# Patient Record
Sex: FEMALE | Race: WHITE | NOT HISPANIC OR LATINO | Employment: OTHER | URBAN - NONMETROPOLITAN AREA
[De-identification: names, ages, dates, MRNs, and addresses within clinical notes are randomized per-mention and may not be internally consistent; named-entity substitution may affect disease eponyms.]

---

## 2017-04-12 ENCOUNTER — OFFICE VISIT (OUTPATIENT)
Dept: OPHTHALMOLOGY | Facility: CLINIC | Age: 72
End: 2017-04-12

## 2017-04-12 DIAGNOSIS — E11.9 TYPE 2 DIABETES MELLITUS WITHOUT RETINOPATHY (HCC): Primary | ICD-10-CM

## 2017-04-12 DIAGNOSIS — H02.836 DERMATOCHALASIS OF BOTH EYELIDS: ICD-10-CM

## 2017-04-12 DIAGNOSIS — H02.833 DERMATOCHALASIS OF BOTH EYELIDS: ICD-10-CM

## 2017-04-12 DIAGNOSIS — H26.9 CATARACT: ICD-10-CM

## 2017-04-12 PROCEDURE — 92020 GONIOSCOPY: CPT | Performed by: OPHTHALMOLOGY

## 2017-04-12 PROCEDURE — 92134 CPTRZ OPH DX IMG PST SGM RTA: CPT | Performed by: OPHTHALMOLOGY

## 2017-04-12 PROCEDURE — 99214 OFFICE O/P EST MOD 30 MIN: CPT | Performed by: OPHTHALMOLOGY

## 2017-04-12 RX ORDER — FUROSEMIDE 40 MG/1
TABLET ORAL 2 TIMES DAILY
COMMUNITY
Start: 2017-03-28 | End: 2022-01-01 | Stop reason: SDUPTHER

## 2017-04-12 NOTE — PROGRESS NOTES
Subjective   Dana Ruiz is a 71 y.o. female.   Chief Complaint   Patient presents with   • Cataract   • Diabetic Eye Exam     HPI     Cataract   In both eyes.  Associated symptoms include blurred vision.  Severity is mild.  Onset was gradual.  Context:  near vision.  Affected activities include reading.  Response to treatment was no improvement.           Diabetic Eye Exam   Associated symptoms include blurred vision.  Diabetes characteristics include taking oral medications and Type 2.  Blood sugar level is controlled.       Last edited by Brayan Day MD on 4/12/2017  8:28 AM. (History)          Review of Systems   Constitutional: Negative for chills and fever.   Respiratory: Negative for shortness of breath.    Cardiovascular: Negative for chest pain.   Gastrointestinal: Negative for abdominal pain.   Endocrine: Negative for polydipsia, polyphagia and polyuria.   Genitourinary: Negative for dysuria.   Psychiatric/Behavioral: Negative for agitation.       Objective   Base Eye Exam     Visual Acuity (Snellen - Linear)      Right Left   Dist sc 20/60 +2 20/70 -2   Near sc J16 20/400         Tonometry (Applanation, 8:36 AM)      Right Left   Pressure 12 12         Gonioscopy      Right Left   Temporal TAM/PTM TAM/PTM   Nasal TAM/PTM TAM/PTM   Superior TAM/PTM TAM/PTM   Inferior TAM/PTM TAM/PTM         Pupils      Pupils   Right PERRL   Left PERRL         Visual Fields      Left Right   Result Full Full         Extraocular Movement      Right Left   Result Full Full         Neuro/Psych     Oriented x3:  Yes      Dilation     Both eyes:  1.0% Mydriacyl @ 8:39 AM            Slit Lamp and Fundus Exam     External Exam      Right Left    External Normal Normal      Slit Lamp Exam      Right Left    Lids/Lashes Dermatochalasis - upper lid Dermatochalasis - upper lid    Conjunctiva/Sclera White and quiet White and quiet    Cornea dense arcus dense arcus with a pannus inferiorly    Anterior Chamber Deep and  quiet Deep and quiet    Iris Round and reactive Round and reactive    Lens Nuclear sclerosis Nuclear sclerosis    Vitreous Normal Normal      Fundus Exam      Right Left    Disc Normal Normal    C/D Ratio 0.15 0.2    Macula Normal Normal    Vessels Normal Normal    Periphery Normal Normal            Refraction     Manifest Refraction      Sphere Cylinder Axis Dist Add   Right +1.50 +1.00 020 20/40-2 +2.50   Left +1.75 +1.00 153 20/20-2 +2.50         Final Rx      Sphere Cylinder Axis Add   Right +1.50 +1.00 020 +2.50   Left +1.75 +1.00 153 +2.50                     OCT Macula:  OD- normal  OS- normal    Assessment/Plan   Diagnoses and all orders for this visit:    Type 2 diabetes mellitus without retinopathy  Comments:  No macula edema. Cause of decreased vision is cataract.    Cataract  Comments:  Visually significant of the right eye.  Risks benefits alternatives of surgery discussed.  Patient would like to observe at this time follow-up in 3 months for     Dermatochalasis of both eyelids  Comments:  Observe.    Plan:       Return in about 3 months (around 7/12/2017) for cataract eval.                            This document has been signed by Brayan Day MD on April 12, 2017 9:38 AM

## 2018-12-21 ENCOUNTER — TRANSCRIBE ORDERS (OUTPATIENT)
Dept: ADMINISTRATIVE | Facility: HOSPITAL | Age: 73
End: 2018-12-21

## 2018-12-21 DIAGNOSIS — I50.9 HEART FAILURE, UNSPECIFIED HF CHRONICITY, UNSPECIFIED HEART FAILURE TYPE (HCC): Primary | ICD-10-CM

## 2020-02-04 ENCOUNTER — HOSPITAL ENCOUNTER (OUTPATIENT)
Dept: ULTRASOUND IMAGING | Facility: HOSPITAL | Age: 75
Discharge: HOME | End: 2020-02-04
Admitting: NURSE PRACTITIONER

## 2020-02-04 DIAGNOSIS — I10 ESSENTIAL (PRIMARY) HYPERTENSION: ICD-10-CM

## 2020-02-04 DIAGNOSIS — N17.9 ACUTE NONTRAUMATIC KIDNEY INJURY (HCC): ICD-10-CM

## 2020-02-04 DIAGNOSIS — N18.4 CHRONIC KIDNEY DISEASE, STAGE 4 (SEVERE) (HCC): ICD-10-CM

## 2020-02-04 PROCEDURE — 76775 US EXAM ABDO BACK WALL LIM: CPT

## 2020-02-11 ENCOUNTER — LAB (OUTPATIENT)
Dept: LAB | Facility: HOSPITAL | Age: 75
End: 2020-02-11

## 2020-02-11 ENCOUNTER — TRANSCRIBE ORDERS (OUTPATIENT)
Dept: LAB | Facility: HOSPITAL | Age: 75
End: 2020-02-11

## 2020-02-11 DIAGNOSIS — I10 ESSENTIAL (PRIMARY) HYPERTENSION: ICD-10-CM

## 2020-02-11 DIAGNOSIS — N17.9 ACUTE NONTRAUMATIC KIDNEY INJURY (HCC): ICD-10-CM

## 2020-02-11 DIAGNOSIS — E11.9 DIABETES MELLITUS WITHOUT COMPLICATION (HCC): ICD-10-CM

## 2020-02-11 DIAGNOSIS — E78.5 HYPERLIPIDEMIA, UNSPECIFIED HYPERLIPIDEMIA TYPE: ICD-10-CM

## 2020-02-11 DIAGNOSIS — I50.9 CONGESTIVE HEART FAILURE, UNSPECIFIED HF CHRONICITY, UNSPECIFIED HEART FAILURE TYPE (HCC): ICD-10-CM

## 2020-02-11 DIAGNOSIS — N18.4 CHRONIC KIDNEY DISEASE, STAGE IV (SEVERE) (HCC): ICD-10-CM

## 2020-02-11 DIAGNOSIS — N17.9 ACUTE NONTRAUMATIC KIDNEY INJURY (HCC): Primary | ICD-10-CM

## 2020-02-11 LAB
25(OH)D3 SERPL-MCNC: 17.8 NG/ML (ref 30–100)
ALBUMIN SERPL-MCNC: 3.7 G/DL (ref 3.5–5.2)
ALBUMIN/GLOB SERPL: 1.1 G/DL
ALP SERPL-CCNC: 86 U/L (ref 39–117)
ALT SERPL W P-5'-P-CCNC: 9 U/L (ref 1–33)
ANION GAP SERPL CALCULATED.3IONS-SCNC: 13.4 MMOL/L (ref 5–15)
AST SERPL-CCNC: 15 U/L (ref 1–32)
BILIRUB SERPL-MCNC: 0.2 MG/DL (ref 0.2–1.2)
BILIRUB UR QL STRIP: NEGATIVE
BUN BLD-MCNC: 23 MG/DL (ref 8–23)
BUN/CREAT SERPL: 17.2 (ref 7–25)
CALCIUM SPEC-SCNC: 9.4 MG/DL (ref 8.6–10.5)
CHLORIDE SERPL-SCNC: 96 MMOL/L (ref 98–107)
CLARITY UR: CLEAR
CO2 SERPL-SCNC: 29.6 MMOL/L (ref 22–29)
COLOR UR: YELLOW
CREAT BLD-MCNC: 1.34 MG/DL (ref 0.57–1)
CREAT UR-MCNC: 36 MG/DL
GFR SERPL CREATININE-BSD FRML MDRD: 39 ML/MIN/1.73
GLOBULIN UR ELPH-MCNC: 3.5 GM/DL
GLUCOSE BLD-MCNC: 250 MG/DL (ref 65–99)
GLUCOSE UR STRIP-MCNC: NEGATIVE MG/DL
HBV SURFACE AG SERPL QL IA: NORMAL
HCV AB SER DONR QL: NORMAL
HGB UR QL STRIP.AUTO: NEGATIVE
HIV1+2 AB SER QL: NORMAL
KETONES UR QL STRIP: NEGATIVE
LEUKOCYTE ESTERASE UR QL STRIP.AUTO: ABNORMAL
MAGNESIUM SERPL-MCNC: 2.5 MG/DL (ref 1.6–2.4)
NITRITE UR QL STRIP: NEGATIVE
PH UR STRIP.AUTO: 6 [PH] (ref 5–8)
PHOSPHATE SERPL-MCNC: 3.6 MG/DL (ref 2.5–4.5)
POTASSIUM BLD-SCNC: 3.5 MMOL/L (ref 3.5–5.2)
PROT SERPL-MCNC: 7.2 G/DL (ref 6–8.5)
PROT UR QL STRIP: NEGATIVE
PROT UR-MCNC: 5 MG/DL
PROT/CREAT UR: 138.9 MG/G CREA (ref 0–200)
PTH-INTACT SERPL-MCNC: 94.1 PG/ML (ref 15–65)
SODIUM BLD-SCNC: 139 MMOL/L (ref 136–145)
SP GR UR STRIP: 1.01 (ref 1–1.03)
URATE SERPL-MCNC: 7.3 MG/DL (ref 2.4–5.7)
UROBILINOGEN UR QL STRIP: ABNORMAL

## 2020-02-11 PROCEDURE — 86225 DNA ANTIBODY NATIVE: CPT

## 2020-02-11 PROCEDURE — 84550 ASSAY OF BLOOD/URIC ACID: CPT

## 2020-02-11 PROCEDURE — 86235 NUCLEAR ANTIGEN ANTIBODY: CPT

## 2020-02-11 PROCEDURE — 83735 ASSAY OF MAGNESIUM: CPT | Performed by: NURSE PRACTITIONER

## 2020-02-11 PROCEDURE — 36415 COLL VENOUS BLD VENIPUNCTURE: CPT | Performed by: NURSE PRACTITIONER

## 2020-02-11 PROCEDURE — 82306 VITAMIN D 25 HYDROXY: CPT | Performed by: NURSE PRACTITIONER

## 2020-02-11 PROCEDURE — 86803 HEPATITIS C AB TEST: CPT

## 2020-02-11 PROCEDURE — 84100 ASSAY OF PHOSPHORUS: CPT

## 2020-02-11 PROCEDURE — 86160 COMPLEMENT ANTIGEN: CPT

## 2020-02-11 PROCEDURE — 84156 ASSAY OF PROTEIN URINE: CPT | Performed by: NURSE PRACTITIONER

## 2020-02-11 PROCEDURE — G0432 EIA HIV-1/HIV-2 SCREEN: HCPCS

## 2020-02-11 PROCEDURE — 86162 COMPLEMENT TOTAL (CH50): CPT

## 2020-02-11 PROCEDURE — 83970 ASSAY OF PARATHORMONE: CPT | Performed by: NURSE PRACTITIONER

## 2020-02-11 PROCEDURE — 82570 ASSAY OF URINE CREATININE: CPT | Performed by: NURSE PRACTITIONER

## 2020-02-11 PROCEDURE — 87340 HEPATITIS B SURFACE AG IA: CPT

## 2020-02-11 PROCEDURE — 81003 URINALYSIS AUTO W/O SCOPE: CPT | Performed by: NURSE PRACTITIONER

## 2020-02-11 PROCEDURE — 80053 COMPREHEN METABOLIC PANEL: CPT | Performed by: NURSE PRACTITIONER

## 2020-02-12 LAB
C3 SERPL-MCNC: 132 MG/DL (ref 82–167)
C4 SERPL-MCNC: 21 MG/DL (ref 14–44)
CENTROMERE B AB SER-ACNC: <0.2 AI (ref 0–0.9)
CH50 SERPL-ACNC: >60 U/ML
CHROMATIN AB SERPL-ACNC: <0.2 AI (ref 0–0.9)
DSDNA AB SER-ACNC: 3 IU/ML (ref 0–9)
ENA JO1 AB SER-ACNC: <0.2 AI (ref 0–0.9)
ENA RNP AB SER-ACNC: <0.2 AI (ref 0–0.9)
ENA SCL70 AB SER-ACNC: <0.2 AI (ref 0–0.9)
ENA SM AB SER-ACNC: <0.2 AI (ref 0–0.9)
ENA SS-A AB SER-ACNC: >8 AI (ref 0–0.9)
ENA SS-B AB SER-ACNC: <0.2 AI (ref 0–0.9)
HOLD SPECIMEN: NORMAL
Lab: ABNORMAL

## 2020-06-10 ENCOUNTER — TRANSCRIBE ORDERS (OUTPATIENT)
Dept: LAB | Facility: HOSPITAL | Age: 75
End: 2020-06-10

## 2020-06-10 ENCOUNTER — LAB (OUTPATIENT)
Dept: LAB | Facility: HOSPITAL | Age: 75
End: 2020-06-10

## 2020-06-10 DIAGNOSIS — I10 ESSENTIAL (PRIMARY) HYPERTENSION: ICD-10-CM

## 2020-06-10 DIAGNOSIS — E11.9 TYPE 2 DIABETES MELLITUS WITHOUT COMPLICATION, UNSPECIFIED WHETHER LONG TERM INSULIN USE (HCC): ICD-10-CM

## 2020-06-10 DIAGNOSIS — I50.9 HEART FAILURE, UNSPECIFIED HF CHRONICITY, UNSPECIFIED HEART FAILURE TYPE (HCC): ICD-10-CM

## 2020-06-10 DIAGNOSIS — E78.5 HYPERLIPIDEMIA, UNSPECIFIED HYPERLIPIDEMIA TYPE: ICD-10-CM

## 2020-06-10 DIAGNOSIS — N17.9 ACUTE RENAL FAILURE, UNSPECIFIED ACUTE RENAL FAILURE TYPE (HCC): Primary | ICD-10-CM

## 2020-06-10 DIAGNOSIS — N18.30 CHRONIC KIDNEY DISEASE, STAGE III (MODERATE) (HCC): ICD-10-CM

## 2020-06-10 LAB
25(OH)D3 SERPL-MCNC: 31.6 NG/ML (ref 30–100)
ALBUMIN SERPL-MCNC: 4 G/DL (ref 3.5–5.2)
ANION GAP SERPL CALCULATED.3IONS-SCNC: 11.1 MMOL/L (ref 5–15)
BUN BLD-MCNC: 27 MG/DL (ref 8–23)
BUN/CREAT SERPL: 20.6 (ref 7–25)
CALCIUM SPEC-SCNC: 9.6 MG/DL (ref 8.6–10.5)
CHLORIDE SERPL-SCNC: 97 MMOL/L (ref 98–107)
CO2 SERPL-SCNC: 32.9 MMOL/L (ref 22–29)
CREAT BLD-MCNC: 1.31 MG/DL (ref 0.57–1)
GFR SERPL CREATININE-BSD FRML MDRD: 40 ML/MIN/1.73
GLUCOSE BLD-MCNC: 153 MG/DL (ref 65–99)
PHOSPHATE SERPL-MCNC: 3.9 MG/DL (ref 2.5–4.5)
POTASSIUM BLD-SCNC: 3.8 MMOL/L (ref 3.5–5.2)
PTH-INTACT SERPL-MCNC: 48.6 PG/ML (ref 15–65)
SODIUM BLD-SCNC: 141 MMOL/L (ref 136–145)

## 2020-06-10 PROCEDURE — 36415 COLL VENOUS BLD VENIPUNCTURE: CPT | Performed by: NURSE PRACTITIONER

## 2020-06-10 PROCEDURE — 83970 ASSAY OF PARATHORMONE: CPT | Performed by: NURSE PRACTITIONER

## 2020-06-10 PROCEDURE — 82306 VITAMIN D 25 HYDROXY: CPT | Performed by: NURSE PRACTITIONER

## 2020-06-10 PROCEDURE — 80069 RENAL FUNCTION PANEL: CPT | Performed by: NURSE PRACTITIONER

## 2020-12-15 ENCOUNTER — TRANSCRIBE ORDERS (OUTPATIENT)
Dept: LAB | Facility: HOSPITAL | Age: 75
End: 2020-12-15

## 2020-12-15 ENCOUNTER — LAB (OUTPATIENT)
Dept: LAB | Facility: HOSPITAL | Age: 75
End: 2020-12-15

## 2020-12-15 DIAGNOSIS — N18.30 STAGE 3 CHRONIC KIDNEY DISEASE, UNSPECIFIED WHETHER STAGE 3A OR 3B CKD (HCC): ICD-10-CM

## 2020-12-15 DIAGNOSIS — E78.5 HYPERLIPIDEMIA, UNSPECIFIED HYPERLIPIDEMIA TYPE: ICD-10-CM

## 2020-12-15 DIAGNOSIS — E11.9 TYPE 2 DIABETES MELLITUS WITHOUT COMPLICATION, UNSPECIFIED WHETHER LONG TERM INSULIN USE (HCC): ICD-10-CM

## 2020-12-15 DIAGNOSIS — I10 ESSENTIAL (PRIMARY) HYPERTENSION: ICD-10-CM

## 2020-12-15 DIAGNOSIS — I50.9 HEART FAILURE, UNSPECIFIED HF CHRONICITY, UNSPECIFIED HEART FAILURE TYPE (HCC): ICD-10-CM

## 2020-12-15 DIAGNOSIS — N17.9 ACUTE RENAL FAILURE, UNSPECIFIED ACUTE RENAL FAILURE TYPE (HCC): ICD-10-CM

## 2020-12-15 DIAGNOSIS — N17.9 ACUTE RENAL FAILURE, UNSPECIFIED ACUTE RENAL FAILURE TYPE (HCC): Primary | ICD-10-CM

## 2020-12-15 PROCEDURE — 83970 ASSAY OF PARATHORMONE: CPT | Performed by: NURSE PRACTITIONER

## 2020-12-15 PROCEDURE — 82728 ASSAY OF FERRITIN: CPT

## 2020-12-15 PROCEDURE — 83540 ASSAY OF IRON: CPT

## 2020-12-15 PROCEDURE — 81003 URINALYSIS AUTO W/O SCOPE: CPT | Performed by: NURSE PRACTITIONER

## 2020-12-15 PROCEDURE — 84550 ASSAY OF BLOOD/URIC ACID: CPT | Performed by: NURSE PRACTITIONER

## 2020-12-15 PROCEDURE — 36415 COLL VENOUS BLD VENIPUNCTURE: CPT

## 2020-12-15 PROCEDURE — 85018 HEMOGLOBIN: CPT | Performed by: NURSE PRACTITIONER

## 2020-12-15 PROCEDURE — 82607 VITAMIN B-12: CPT | Performed by: NURSE PRACTITIONER

## 2020-12-15 PROCEDURE — 80069 RENAL FUNCTION PANEL: CPT | Performed by: NURSE PRACTITIONER

## 2020-12-15 PROCEDURE — 84156 ASSAY OF PROTEIN URINE: CPT | Performed by: NURSE PRACTITIONER

## 2020-12-15 PROCEDURE — 84466 ASSAY OF TRANSFERRIN: CPT

## 2020-12-15 PROCEDURE — 82570 ASSAY OF URINE CREATININE: CPT | Performed by: NURSE PRACTITIONER

## 2020-12-15 PROCEDURE — 85014 HEMATOCRIT: CPT | Performed by: NURSE PRACTITIONER

## 2020-12-15 PROCEDURE — 82306 VITAMIN D 25 HYDROXY: CPT | Performed by: NURSE PRACTITIONER

## 2020-12-15 PROCEDURE — 82746 ASSAY OF FOLIC ACID SERUM: CPT

## 2020-12-16 LAB
25(OH)D3 SERPL-MCNC: 41.8 NG/ML (ref 30–100)
ALBUMIN SERPL-MCNC: 4 G/DL (ref 3.5–5.2)
ANION GAP SERPL CALCULATED.3IONS-SCNC: 12.1 MMOL/L (ref 5–15)
BILIRUB UR QL STRIP: NEGATIVE
BUN SERPL-MCNC: 19 MG/DL (ref 8–23)
BUN/CREAT SERPL: 16.5 (ref 7–25)
CALCIUM SPEC-SCNC: 9.5 MG/DL (ref 8.6–10.5)
CHLORIDE SERPL-SCNC: 98 MMOL/L (ref 98–107)
CLARITY UR: CLEAR
CO2 SERPL-SCNC: 28.9 MMOL/L (ref 22–29)
COLOR UR: YELLOW
CREAT SERPL-MCNC: 1.15 MG/DL (ref 0.57–1)
CREAT UR-MCNC: 28.5 MG/DL
FERRITIN SERPL-MCNC: 153 NG/ML (ref 13–150)
FOLATE SERPL-MCNC: 19.9 NG/ML (ref 4.78–24.2)
GFR SERPL CREATININE-BSD FRML MDRD: 46 ML/MIN/1.73
GLUCOSE SERPL-MCNC: 220 MG/DL (ref 65–99)
GLUCOSE UR STRIP-MCNC: NEGATIVE MG/DL
HCT VFR BLD AUTO: 36 % (ref 34–46.6)
HGB BLD-MCNC: 11.8 G/DL (ref 12–15.9)
HGB UR QL STRIP.AUTO: NEGATIVE
IRON 24H UR-MRATE: 31 MCG/DL (ref 37–145)
IRON SATN MFR SERPL: 9 % (ref 20–50)
KETONES UR QL STRIP: NEGATIVE
LEUKOCYTE ESTERASE UR QL STRIP.AUTO: NEGATIVE
NITRITE UR QL STRIP: NEGATIVE
PH UR STRIP.AUTO: 6.5 [PH] (ref 5–8)
PHOSPHATE SERPL-MCNC: 3.3 MG/DL (ref 2.5–4.5)
POTASSIUM SERPL-SCNC: 3.6 MMOL/L (ref 3.5–5.2)
PROT UR QL STRIP: NEGATIVE
PROT UR-MCNC: 8 MG/DL
PROT/CREAT UR: 280.7 MG/G CREA (ref 0–200)
PTH-INTACT SERPL-MCNC: 68.9 PG/ML (ref 15–65)
SODIUM SERPL-SCNC: 139 MMOL/L (ref 136–145)
SP GR UR STRIP: 1.01 (ref 1–1.03)
TIBC SERPL-MCNC: 352 MCG/DL (ref 298–536)
TRANSFERRIN SERPL-MCNC: 236 MG/DL (ref 200–360)
URATE SERPL-MCNC: 8.1 MG/DL (ref 2.4–5.7)
UROBILINOGEN UR QL STRIP: NORMAL
VIT B12 BLD-MCNC: 341 PG/ML (ref 211–946)

## 2021-06-15 ENCOUNTER — TRANSCRIBE ORDERS (OUTPATIENT)
Dept: LAB | Facility: HOSPITAL | Age: 76
End: 2021-06-15

## 2021-06-15 ENCOUNTER — LAB (OUTPATIENT)
Dept: LAB | Facility: HOSPITAL | Age: 76
End: 2021-06-15

## 2021-06-15 DIAGNOSIS — N18.32 CHRONIC KIDNEY DISEASE (CKD) STAGE G3B/A1, MODERATELY DECREASED GLOMERULAR FILTRATION RATE (GFR) BETWEEN 30-44 ML/MIN/1.73 SQUARE METER AND ALBUMINURIA CREATININE RATIO LESS THAN 30 MG/G (CMS/H* (HCC): Primary | ICD-10-CM

## 2021-06-15 DIAGNOSIS — I50.9 HEART FAILURE, UNSPECIFIED HF CHRONICITY, UNSPECIFIED HEART FAILURE TYPE (HCC): ICD-10-CM

## 2021-06-15 DIAGNOSIS — I10 HYPERTENSION, ESSENTIAL: ICD-10-CM

## 2021-06-15 DIAGNOSIS — E11.9 TYPE 2 DIABETES MELLITUS WITHOUT COMPLICATION, UNSPECIFIED WHETHER LONG TERM INSULIN USE (HCC): ICD-10-CM

## 2021-06-15 PROCEDURE — 83735 ASSAY OF MAGNESIUM: CPT | Performed by: NURSE PRACTITIONER

## 2021-06-15 PROCEDURE — 36415 COLL VENOUS BLD VENIPUNCTURE: CPT | Performed by: NURSE PRACTITIONER

## 2021-06-15 PROCEDURE — 84550 ASSAY OF BLOOD/URIC ACID: CPT | Performed by: NURSE PRACTITIONER

## 2021-06-15 PROCEDURE — 84466 ASSAY OF TRANSFERRIN: CPT | Performed by: NURSE PRACTITIONER

## 2021-06-15 PROCEDURE — 80069 RENAL FUNCTION PANEL: CPT | Performed by: NURSE PRACTITIONER

## 2021-06-15 PROCEDURE — 83540 ASSAY OF IRON: CPT | Performed by: NURSE PRACTITIONER

## 2021-06-16 LAB
ALBUMIN SERPL-MCNC: 4.3 G/DL (ref 3.5–5.2)
ANION GAP SERPL CALCULATED.3IONS-SCNC: 8.2 MMOL/L (ref 5–15)
BUN SERPL-MCNC: 30 MG/DL (ref 8–23)
BUN/CREAT SERPL: 24.4 (ref 7–25)
CALCIUM SPEC-SCNC: 9.9 MG/DL (ref 8.6–10.5)
CHLORIDE SERPL-SCNC: 99 MMOL/L (ref 98–107)
CO2 SERPL-SCNC: 32.8 MMOL/L (ref 22–29)
CREAT SERPL-MCNC: 1.23 MG/DL (ref 0.57–1)
GFR SERPL CREATININE-BSD FRML MDRD: 42 ML/MIN/1.73
GLUCOSE SERPL-MCNC: 114 MG/DL (ref 65–99)
IRON 24H UR-MRATE: 98 MCG/DL (ref 37–145)
IRON SATN MFR SERPL: 24 % (ref 20–50)
MAGNESIUM SERPL-MCNC: 2.2 MG/DL (ref 1.6–2.4)
PHOSPHATE SERPL-MCNC: 3 MG/DL (ref 2.5–4.5)
POTASSIUM SERPL-SCNC: 4.2 MMOL/L (ref 3.5–5.2)
SODIUM SERPL-SCNC: 140 MMOL/L (ref 136–145)
TIBC SERPL-MCNC: 410 MCG/DL (ref 298–536)
TRANSFERRIN SERPL-MCNC: 275 MG/DL (ref 200–360)
URATE SERPL-MCNC: 7.5 MG/DL (ref 2.4–5.7)

## 2021-12-20 ENCOUNTER — OUTSIDE FACILITY SERVICE (OUTPATIENT)
Dept: CARDIOLOGY | Facility: CLINIC | Age: 76
End: 2021-12-20

## 2021-12-20 DIAGNOSIS — R06.02 SHORTNESS OF BREATH: ICD-10-CM

## 2021-12-20 PROCEDURE — 93010 ELECTROCARDIOGRAM REPORT: CPT | Performed by: INTERNAL MEDICINE

## 2021-12-21 ENCOUNTER — APPOINTMENT (OUTPATIENT)
Dept: ULTRASOUND IMAGING | Facility: HOSPITAL | Age: 76
End: 2021-12-21

## 2021-12-21 ENCOUNTER — HOSPITAL ENCOUNTER (INPATIENT)
Facility: HOSPITAL | Age: 76
LOS: 2 days | Discharge: HOME | End: 2021-12-23
Attending: EMERGENCY MEDICINE | Admitting: FAMILY MEDICINE

## 2021-12-21 ENCOUNTER — APPOINTMENT (OUTPATIENT)
Dept: GENERAL RADIOLOGY | Facility: HOSPITAL | Age: 76
End: 2021-12-21

## 2021-12-21 DIAGNOSIS — J18.9 PNEUMONIA DUE TO INFECTIOUS ORGANISM, UNSPECIFIED LATERALITY, UNSPECIFIED PART OF LUNG: ICD-10-CM

## 2021-12-21 DIAGNOSIS — R06.09 DYSPNEA ON EXERTION: ICD-10-CM

## 2021-12-21 DIAGNOSIS — R09.02 HYPOXIA: Primary | ICD-10-CM

## 2021-12-21 PROBLEM — E11.9 TYPE 2 DIABETES MELLITUS: Status: ACTIVE | Noted: 2021-12-21

## 2021-12-21 PROBLEM — N18.30 CKD (CHRONIC KIDNEY DISEASE) STAGE 3, GFR 30-59 ML/MIN: Status: ACTIVE | Noted: 2021-12-21

## 2021-12-21 PROBLEM — J44.1 COPD WITH ACUTE EXACERBATION (HCC): Status: ACTIVE | Noted: 2021-12-21

## 2021-12-21 LAB
ALBUMIN SERPL-MCNC: 3.8 G/DL (ref 3.5–5.2)
ALBUMIN/GLOB SERPL: 1 G/DL
ALP SERPL-CCNC: 86 U/L (ref 39–117)
ALT SERPL W P-5'-P-CCNC: 8 U/L (ref 1–33)
ANION GAP SERPL CALCULATED.3IONS-SCNC: 14 MMOL/L (ref 5–15)
AST SERPL-CCNC: 24 U/L (ref 1–32)
BACTERIA UR QL AUTO: ABNORMAL /HPF
BASOPHILS # BLD AUTO: 0.04 10*3/MM3 (ref 0–0.2)
BASOPHILS NFR BLD AUTO: 0.4 % (ref 0–1.5)
BILIRUB SERPL-MCNC: 0.5 MG/DL (ref 0–1.2)
BILIRUB UR QL STRIP: NEGATIVE
BUN SERPL-MCNC: 29 MG/DL (ref 8–23)
BUN/CREAT SERPL: 21 (ref 7–25)
CALCIUM SPEC-SCNC: 9.3 MG/DL (ref 8.6–10.5)
CHLORIDE SERPL-SCNC: 94 MMOL/L (ref 98–107)
CLARITY UR: CLEAR
CO2 SERPL-SCNC: 28 MMOL/L (ref 22–29)
COLOR UR: YELLOW
CREAT SERPL-MCNC: 1.38 MG/DL (ref 0.57–1)
D-DIMER, QUANTITATIVE (MAD,POW, STR): 1868 NG/ML (FEU) (ref 0–470)
D-LACTATE SERPL-SCNC: 1.8 MMOL/L (ref 0.5–2)
DEPRECATED RDW RBC AUTO: 45.7 FL (ref 37–54)
EOSINOPHIL # BLD AUTO: 0.01 10*3/MM3 (ref 0–0.4)
EOSINOPHIL NFR BLD AUTO: 0.1 % (ref 0.3–6.2)
ERYTHROCYTE [DISTWIDTH] IN BLOOD BY AUTOMATED COUNT: 14.2 % (ref 12.3–15.4)
FLUAV SUBTYP SPEC NAA+PROBE: NOT DETECTED
FLUBV RNA ISLT QL NAA+PROBE: NOT DETECTED
GFR SERPL CREATININE-BSD FRML MDRD: 37 ML/MIN/1.73
GLOBULIN UR ELPH-MCNC: 4 GM/DL
GLUCOSE SERPL-MCNC: 208 MG/DL (ref 65–99)
GLUCOSE UR STRIP-MCNC: NEGATIVE MG/DL
HCT VFR BLD AUTO: 36.6 % (ref 34–46.6)
HGB BLD-MCNC: 12.2 G/DL (ref 12–15.9)
HGB UR QL STRIP.AUTO: ABNORMAL
HOLD SPECIMEN: NORMAL
HYALINE CASTS UR QL AUTO: ABNORMAL /LPF
IMM GRANULOCYTES # BLD AUTO: 0.06 10*3/MM3 (ref 0–0.05)
IMM GRANULOCYTES NFR BLD AUTO: 0.6 % (ref 0–0.5)
KETONES UR QL STRIP: ABNORMAL
LEUKOCYTE ESTERASE UR QL STRIP.AUTO: NEGATIVE
LYMPHOCYTES # BLD AUTO: 0.75 10*3/MM3 (ref 0.7–3.1)
LYMPHOCYTES NFR BLD AUTO: 7.7 % (ref 19.6–45.3)
MCH RBC QN AUTO: 29 PG (ref 26.6–33)
MCHC RBC AUTO-ENTMCNC: 33.3 G/DL (ref 31.5–35.7)
MCV RBC AUTO: 87.1 FL (ref 79–97)
MONOCYTES # BLD AUTO: 0.41 10*3/MM3 (ref 0.1–0.9)
MONOCYTES NFR BLD AUTO: 4.2 % (ref 5–12)
NEUTROPHILS NFR BLD AUTO: 8.52 10*3/MM3 (ref 1.7–7)
NEUTROPHILS NFR BLD AUTO: 87 % (ref 42.7–76)
NITRITE UR QL STRIP: NEGATIVE
NRBC BLD AUTO-RTO: 0 /100 WBC (ref 0–0.2)
NT-PROBNP SERPL-MCNC: 2980 PG/ML (ref 0–1800)
PH UR STRIP.AUTO: 6 [PH] (ref 5–9)
PLATELET # BLD AUTO: 236 10*3/MM3 (ref 140–450)
PMV BLD AUTO: 9.9 FL (ref 6–12)
POTASSIUM SERPL-SCNC: 4.3 MMOL/L (ref 3.5–5.2)
PROCALCITONIN SERPL-MCNC: 0.4 NG/ML (ref 0–0.25)
PROT SERPL-MCNC: 7.8 G/DL (ref 6–8.5)
PROT UR QL STRIP: NEGATIVE
RBC # BLD AUTO: 4.2 10*6/MM3 (ref 3.77–5.28)
RBC # UR STRIP: ABNORMAL /HPF
REF LAB TEST METHOD: ABNORMAL
SARS-COV-2 RNA PNL SPEC NAA+PROBE: NOT DETECTED
SODIUM SERPL-SCNC: 136 MMOL/L (ref 136–145)
SP GR UR STRIP: 1.01 (ref 1–1.03)
SQUAMOUS #/AREA URNS HPF: ABNORMAL /HPF
TROPONIN T SERPL-MCNC: <0.01 NG/ML (ref 0–0.03)
UROBILINOGEN UR QL STRIP: ABNORMAL
WBC # UR STRIP: ABNORMAL /HPF
WBC NRBC COR # BLD: 9.79 10*3/MM3 (ref 3.4–10.8)
WHOLE BLOOD HOLD SPECIMEN: NORMAL

## 2021-12-21 PROCEDURE — 82962 GLUCOSE BLOOD TEST: CPT

## 2021-12-21 PROCEDURE — 93005 ELECTROCARDIOGRAM TRACING: CPT | Performed by: EMERGENCY MEDICINE

## 2021-12-21 PROCEDURE — 85379 FIBRIN DEGRADATION QUANT: CPT | Performed by: NURSE PRACTITIONER

## 2021-12-21 PROCEDURE — 94799 UNLISTED PULMONARY SVC/PX: CPT

## 2021-12-21 PROCEDURE — 25010000002 CEFTRIAXONE PER 250 MG: Performed by: NURSE PRACTITIONER

## 2021-12-21 PROCEDURE — 83605 ASSAY OF LACTIC ACID: CPT | Performed by: NURSE PRACTITIONER

## 2021-12-21 PROCEDURE — 83880 ASSAY OF NATRIURETIC PEPTIDE: CPT | Performed by: EMERGENCY MEDICINE

## 2021-12-21 PROCEDURE — 81001 URINALYSIS AUTO W/SCOPE: CPT | Performed by: NURSE PRACTITIONER

## 2021-12-21 PROCEDURE — 94640 AIRWAY INHALATION TREATMENT: CPT

## 2021-12-21 PROCEDURE — 84484 ASSAY OF TROPONIN QUANT: CPT | Performed by: EMERGENCY MEDICINE

## 2021-12-21 PROCEDURE — 87040 BLOOD CULTURE FOR BACTERIA: CPT | Performed by: NURSE PRACTITIONER

## 2021-12-21 PROCEDURE — 87636 SARSCOV2 & INF A&B AMP PRB: CPT | Performed by: NURSE PRACTITIONER

## 2021-12-21 PROCEDURE — 84145 PROCALCITONIN (PCT): CPT | Performed by: NURSE PRACTITIONER

## 2021-12-21 PROCEDURE — 93970 EXTREMITY STUDY: CPT

## 2021-12-21 PROCEDURE — 71046 X-RAY EXAM CHEST 2 VIEWS: CPT

## 2021-12-21 PROCEDURE — 36415 COLL VENOUS BLD VENIPUNCTURE: CPT | Performed by: NURSE PRACTITIONER

## 2021-12-21 PROCEDURE — 99284 EMERGENCY DEPT VISIT MOD MDM: CPT

## 2021-12-21 PROCEDURE — 25010000002 METHYLPREDNISOLONE PER 40 MG: Performed by: FAMILY MEDICINE

## 2021-12-21 PROCEDURE — 93010 ELECTROCARDIOGRAM REPORT: CPT | Performed by: INTERNAL MEDICINE

## 2021-12-21 PROCEDURE — 85025 COMPLETE CBC W/AUTO DIFF WBC: CPT | Performed by: EMERGENCY MEDICINE

## 2021-12-21 PROCEDURE — 80053 COMPREHEN METABOLIC PANEL: CPT | Performed by: EMERGENCY MEDICINE

## 2021-12-21 RX ORDER — MELATONIN
2000 DAILY
Status: DISCONTINUED | OUTPATIENT
Start: 2021-12-22 | End: 2021-12-23 | Stop reason: HOSPADM

## 2021-12-21 RX ORDER — POLYETHYLENE GLYCOL 3350 17 G/17G
17 POWDER, FOR SOLUTION ORAL DAILY PRN
Status: DISCONTINUED | OUTPATIENT
Start: 2021-12-21 | End: 2021-12-23 | Stop reason: HOSPADM

## 2021-12-21 RX ORDER — FUROSEMIDE 40 MG/1
40 TABLET ORAL DAILY
Status: DISCONTINUED | OUTPATIENT
Start: 2021-12-22 | End: 2021-12-23 | Stop reason: HOSPADM

## 2021-12-21 RX ORDER — DEXTROSE MONOHYDRATE 25 G/50ML
25 INJECTION, SOLUTION INTRAVENOUS
Status: DISCONTINUED | OUTPATIENT
Start: 2021-12-21 | End: 2021-12-23 | Stop reason: HOSPADM

## 2021-12-21 RX ORDER — FERROUS SULFATE TAB EC 324 MG (65 MG FE EQUIVALENT) 324 (65 FE) MG
324 TABLET DELAYED RESPONSE ORAL
Status: DISCONTINUED | OUTPATIENT
Start: 2021-12-22 | End: 2021-12-23 | Stop reason: HOSPADM

## 2021-12-21 RX ORDER — BISACODYL 10 MG
10 SUPPOSITORY, RECTAL RECTAL DAILY PRN
Status: DISCONTINUED | OUTPATIENT
Start: 2021-12-21 | End: 2021-12-23 | Stop reason: HOSPADM

## 2021-12-21 RX ORDER — BISACODYL 5 MG/1
5 TABLET, DELAYED RELEASE ORAL DAILY PRN
Status: DISCONTINUED | OUTPATIENT
Start: 2021-12-21 | End: 2021-12-23 | Stop reason: HOSPADM

## 2021-12-21 RX ORDER — NICOTINE POLACRILEX 4 MG
15 LOZENGE BUCCAL
Status: DISCONTINUED | OUTPATIENT
Start: 2021-12-21 | End: 2021-12-23 | Stop reason: HOSPADM

## 2021-12-21 RX ORDER — ACETAMINOPHEN 160 MG/5ML
650 SOLUTION ORAL EVERY 4 HOURS PRN
Status: DISCONTINUED | OUTPATIENT
Start: 2021-12-21 | End: 2021-12-21

## 2021-12-21 RX ORDER — SODIUM CHLORIDE 0.9 % (FLUSH) 0.9 %
10 SYRINGE (ML) INJECTION AS NEEDED
Status: DISCONTINUED | OUTPATIENT
Start: 2021-12-21 | End: 2021-12-23 | Stop reason: HOSPADM

## 2021-12-21 RX ORDER — CALCIUM CARBONATE 200(500)MG
2 TABLET,CHEWABLE ORAL 2 TIMES DAILY PRN
Status: DISCONTINUED | OUTPATIENT
Start: 2021-12-21 | End: 2021-12-23 | Stop reason: HOSPADM

## 2021-12-21 RX ORDER — ONDANSETRON 2 MG/ML
4 INJECTION INTRAMUSCULAR; INTRAVENOUS EVERY 6 HOURS PRN
Status: DISCONTINUED | OUTPATIENT
Start: 2021-12-21 | End: 2021-12-23 | Stop reason: HOSPADM

## 2021-12-21 RX ORDER — ONDANSETRON 4 MG/1
4 TABLET, FILM COATED ORAL EVERY 6 HOURS PRN
Status: DISCONTINUED | OUTPATIENT
Start: 2021-12-21 | End: 2021-12-23 | Stop reason: HOSPADM

## 2021-12-21 RX ORDER — SODIUM CHLORIDE 0.9 % (FLUSH) 0.9 %
10 SYRINGE (ML) INJECTION EVERY 12 HOURS SCHEDULED
Status: DISCONTINUED | OUTPATIENT
Start: 2021-12-21 | End: 2021-12-23 | Stop reason: HOSPADM

## 2021-12-21 RX ORDER — ALBUTEROL SULFATE 2.5 MG/3ML
2.5 SOLUTION RESPIRATORY (INHALATION) ONCE
Status: COMPLETED | OUTPATIENT
Start: 2021-12-21 | End: 2021-12-21

## 2021-12-21 RX ORDER — LANOLIN ALCOHOL/MO/W.PET/CERES
6 CREAM (GRAM) TOPICAL NIGHTLY PRN
Status: DISCONTINUED | OUTPATIENT
Start: 2021-12-21 | End: 2021-12-23 | Stop reason: HOSPADM

## 2021-12-21 RX ORDER — ACETAMINOPHEN 650 MG/1
650 SUPPOSITORY RECTAL EVERY 4 HOURS PRN
Status: DISCONTINUED | OUTPATIENT
Start: 2021-12-21 | End: 2021-12-23 | Stop reason: HOSPADM

## 2021-12-21 RX ORDER — AMOXICILLIN 250 MG
2 CAPSULE ORAL 2 TIMES DAILY PRN
Status: DISCONTINUED | OUTPATIENT
Start: 2021-12-21 | End: 2021-12-23 | Stop reason: HOSPADM

## 2021-12-21 RX ORDER — BENZONATATE 100 MG/1
200 CAPSULE ORAL 3 TIMES DAILY PRN
Status: DISCONTINUED | OUTPATIENT
Start: 2021-12-21 | End: 2021-12-21

## 2021-12-21 RX ORDER — METHYLPREDNISOLONE SODIUM SUCCINATE 40 MG/ML
40 INJECTION, POWDER, LYOPHILIZED, FOR SOLUTION INTRAMUSCULAR; INTRAVENOUS EVERY 24 HOURS
Status: DISCONTINUED | OUTPATIENT
Start: 2021-12-21 | End: 2021-12-23 | Stop reason: HOSPADM

## 2021-12-21 RX ORDER — ASPIRIN 81 MG/1
81 TABLET ORAL DAILY
Status: DISCONTINUED | OUTPATIENT
Start: 2021-12-22 | End: 2021-12-23 | Stop reason: HOSPADM

## 2021-12-21 RX ORDER — GUAIFENESIN/DEXTROMETHORPHAN 100-10MG/5
5 SYRUP ORAL EVERY 4 HOURS PRN
Status: DISCONTINUED | OUTPATIENT
Start: 2021-12-21 | End: 2021-12-23 | Stop reason: HOSPADM

## 2021-12-21 RX ORDER — ACETAMINOPHEN 325 MG/1
650 TABLET ORAL EVERY 4 HOURS PRN
Status: DISCONTINUED | OUTPATIENT
Start: 2021-12-21 | End: 2021-12-23 | Stop reason: HOSPADM

## 2021-12-21 RX ORDER — LISINOPRIL 5 MG/1
5 TABLET ORAL
Status: DISCONTINUED | OUTPATIENT
Start: 2021-12-22 | End: 2021-12-23 | Stop reason: HOSPADM

## 2021-12-21 RX ORDER — IPRATROPIUM BROMIDE AND ALBUTEROL SULFATE 2.5; .5 MG/3ML; MG/3ML
3 SOLUTION RESPIRATORY (INHALATION)
Status: DISCONTINUED | OUTPATIENT
Start: 2021-12-21 | End: 2021-12-23 | Stop reason: HOSPADM

## 2021-12-21 RX ADMIN — IPRATROPIUM BROMIDE AND ALBUTEROL SULFATE 3 ML: 2.5; .5 SOLUTION RESPIRATORY (INHALATION) at 22:45

## 2021-12-21 RX ADMIN — SODIUM CHLORIDE 500 ML: 9 INJECTION, SOLUTION INTRAVENOUS at 18:45

## 2021-12-21 RX ADMIN — METHYLPREDNISOLONE SODIUM SUCCINATE 40 MG: 40 INJECTION, POWDER, FOR SOLUTION INTRAMUSCULAR; INTRAVENOUS at 22:24

## 2021-12-21 RX ADMIN — CEFTRIAXONE SODIUM 1 G: 1 INJECTION, POWDER, FOR SOLUTION INTRAMUSCULAR; INTRAVENOUS at 19:53

## 2021-12-21 RX ADMIN — SODIUM CHLORIDE, PRESERVATIVE FREE 10 ML: 5 INJECTION INTRAVENOUS at 22:24

## 2021-12-21 RX ADMIN — ALBUTEROL SULFATE 2.5 MG: 2.5 SOLUTION RESPIRATORY (INHALATION) at 18:57

## 2021-12-21 RX ADMIN — DOXYCYCLINE 100 MG: 100 INJECTION, POWDER, LYOPHILIZED, FOR SOLUTION INTRAVENOUS at 22:22

## 2021-12-22 ENCOUNTER — APPOINTMENT (OUTPATIENT)
Dept: NUCLEAR MEDICINE | Facility: HOSPITAL | Age: 76
End: 2021-12-22

## 2021-12-22 LAB
ALBUMIN SERPL-MCNC: 3.3 G/DL (ref 3.5–5.2)
ALBUMIN/GLOB SERPL: 0.9 G/DL
ALP SERPL-CCNC: 70 U/L (ref 39–117)
ALT SERPL W P-5'-P-CCNC: 12 U/L (ref 1–33)
ANION GAP SERPL CALCULATED.3IONS-SCNC: 15 MMOL/L (ref 5–15)
ANISOCYTOSIS BLD QL: NORMAL
AST SERPL-CCNC: 27 U/L (ref 1–32)
BASOPHILS # BLD AUTO: 0.02 10*3/MM3 (ref 0–0.2)
BASOPHILS NFR BLD AUTO: 0.2 % (ref 0–1.5)
BILIRUB SERPL-MCNC: 0.3 MG/DL (ref 0–1.2)
BUN SERPL-MCNC: 33 MG/DL (ref 8–23)
BUN/CREAT SERPL: 22.4 (ref 7–25)
CALCIUM SPEC-SCNC: 8.6 MG/DL (ref 8.6–10.5)
CHLORIDE SERPL-SCNC: 94 MMOL/L (ref 98–107)
CO2 SERPL-SCNC: 25 MMOL/L (ref 22–29)
CREAT SERPL-MCNC: 1.47 MG/DL (ref 0.57–1)
DEPRECATED RDW RBC AUTO: 45.9 FL (ref 37–54)
EOSINOPHIL # BLD AUTO: 0 10*3/MM3 (ref 0–0.4)
EOSINOPHIL NFR BLD AUTO: 0 % (ref 0.3–6.2)
ERYTHROCYTE [DISTWIDTH] IN BLOOD BY AUTOMATED COUNT: 14.3 % (ref 12.3–15.4)
GFR SERPL CREATININE-BSD FRML MDRD: 34 ML/MIN/1.73
GLOBULIN UR ELPH-MCNC: 3.8 GM/DL
GLUCOSE BLDC GLUCOMTR-MCNC: 162 MG/DL (ref 70–130)
GLUCOSE BLDC GLUCOMTR-MCNC: 184 MG/DL (ref 70–130)
GLUCOSE BLDC GLUCOMTR-MCNC: 257 MG/DL (ref 70–130)
GLUCOSE BLDC GLUCOMTR-MCNC: 285 MG/DL (ref 70–130)
GLUCOSE BLDC GLUCOMTR-MCNC: 331 MG/DL (ref 70–130)
GLUCOSE SERPL-MCNC: 243 MG/DL (ref 65–99)
HCT VFR BLD AUTO: 32.5 % (ref 34–46.6)
HGB BLD-MCNC: 10.7 G/DL (ref 12–15.9)
HOLD SPECIMEN: NORMAL
IMM GRANULOCYTES # BLD AUTO: 0.04 10*3/MM3 (ref 0–0.05)
IMM GRANULOCYTES NFR BLD AUTO: 0.4 % (ref 0–0.5)
LYMPHOCYTES # BLD AUTO: 0.74 10*3/MM3 (ref 0.7–3.1)
LYMPHOCYTES NFR BLD AUTO: 7.4 % (ref 19.6–45.3)
MAGNESIUM SERPL-MCNC: 2.1 MG/DL (ref 1.6–2.4)
MCH RBC QN AUTO: 28.7 PG (ref 26.6–33)
MCHC RBC AUTO-ENTMCNC: 32.9 G/DL (ref 31.5–35.7)
MCV RBC AUTO: 87.1 FL (ref 79–97)
MONOCYTES # BLD AUTO: 0.08 10*3/MM3 (ref 0.1–0.9)
MONOCYTES NFR BLD AUTO: 0.8 % (ref 5–12)
NEUTROPHILS NFR BLD AUTO: 9.06 10*3/MM3 (ref 1.7–7)
NEUTROPHILS NFR BLD AUTO: 91.2 % (ref 42.7–76)
NRBC BLD AUTO-RTO: 0 /100 WBC (ref 0–0.2)
PLATELET # BLD AUTO: 222 10*3/MM3 (ref 140–450)
PMV BLD AUTO: 9.9 FL (ref 6–12)
POTASSIUM SERPL-SCNC: 4.8 MMOL/L (ref 3.5–5.2)
PROT SERPL-MCNC: 7.1 G/DL (ref 6–8.5)
RBC # BLD AUTO: 3.73 10*6/MM3 (ref 3.77–5.28)
SMALL PLATELETS BLD QL SMEAR: ADEQUATE
SODIUM SERPL-SCNC: 134 MMOL/L (ref 136–145)
TSH SERPL DL<=0.05 MIU/L-ACNC: 0.29 UIU/ML (ref 0.27–4.2)
WBC MORPH BLD: NORMAL
WBC NRBC COR # BLD: 9.94 10*3/MM3 (ref 3.4–10.8)

## 2021-12-22 PROCEDURE — 94799 UNLISTED PULMONARY SVC/PX: CPT

## 2021-12-22 PROCEDURE — 83735 ASSAY OF MAGNESIUM: CPT | Performed by: FAMILY MEDICINE

## 2021-12-22 PROCEDURE — 25010000002 CEFTRIAXONE PER 250 MG: Performed by: FAMILY MEDICINE

## 2021-12-22 PROCEDURE — A9540 TC99M MAA: HCPCS | Performed by: FAMILY MEDICINE

## 2021-12-22 PROCEDURE — 25010000002 ENOXAPARIN PER 10 MG: Performed by: FAMILY MEDICINE

## 2021-12-22 PROCEDURE — 63710000001 INSULIN ASPART PER 5 UNITS: Performed by: FAMILY MEDICINE

## 2021-12-22 PROCEDURE — 82962 GLUCOSE BLOOD TEST: CPT

## 2021-12-22 PROCEDURE — 94760 N-INVAS EAR/PLS OXIMETRY 1: CPT

## 2021-12-22 PROCEDURE — 0 TECHNETIUM ALBUMIN AGGREGATED: Performed by: FAMILY MEDICINE

## 2021-12-22 PROCEDURE — 78580 LUNG PERFUSION IMAGING: CPT

## 2021-12-22 PROCEDURE — 85025 COMPLETE CBC W/AUTO DIFF WBC: CPT | Performed by: FAMILY MEDICINE

## 2021-12-22 PROCEDURE — 85007 BL SMEAR W/DIFF WBC COUNT: CPT | Performed by: FAMILY MEDICINE

## 2021-12-22 PROCEDURE — 80053 COMPREHEN METABOLIC PANEL: CPT | Performed by: FAMILY MEDICINE

## 2021-12-22 PROCEDURE — 25010000002 METHYLPREDNISOLONE PER 40 MG: Performed by: FAMILY MEDICINE

## 2021-12-22 PROCEDURE — 84443 ASSAY THYROID STIM HORMONE: CPT | Performed by: FAMILY MEDICINE

## 2021-12-22 RX ADMIN — Medication 2000 UNITS: at 08:17

## 2021-12-22 RX ADMIN — FUROSEMIDE 40 MG: 40 TABLET ORAL at 08:17

## 2021-12-22 RX ADMIN — KIT FOR THE PREPARATION OF TECHNETIUM TC 99M ALBUMIN AGGREGATED 1 DOSE: 2.5 INJECTION, POWDER, FOR SOLUTION INTRAVENOUS at 11:10

## 2021-12-22 RX ADMIN — IPRATROPIUM BROMIDE AND ALBUTEROL SULFATE 3 ML: 2.5; .5 SOLUTION RESPIRATORY (INHALATION) at 08:43

## 2021-12-22 RX ADMIN — METHYLPREDNISOLONE SODIUM SUCCINATE 40 MG: 40 INJECTION, POWDER, FOR SOLUTION INTRAMUSCULAR; INTRAVENOUS at 21:03

## 2021-12-22 RX ADMIN — IPRATROPIUM BROMIDE AND ALBUTEROL SULFATE 3 ML: 2.5; .5 SOLUTION RESPIRATORY (INHALATION) at 12:21

## 2021-12-22 RX ADMIN — ASPIRIN 81 MG: 81 TABLET, FILM COATED ORAL at 08:17

## 2021-12-22 RX ADMIN — DOXYCYCLINE 100 MG: 100 INJECTION, POWDER, LYOPHILIZED, FOR SOLUTION INTRAVENOUS at 23:20

## 2021-12-22 RX ADMIN — CEFTRIAXONE SODIUM 1 G: 1 INJECTION, POWDER, FOR SOLUTION INTRAMUSCULAR; INTRAVENOUS at 21:03

## 2021-12-22 RX ADMIN — SODIUM CHLORIDE, PRESERVATIVE FREE 10 ML: 5 INJECTION INTRAVENOUS at 09:19

## 2021-12-22 RX ADMIN — IPRATROPIUM BROMIDE AND ALBUTEROL SULFATE 3 ML: 2.5; .5 SOLUTION RESPIRATORY (INHALATION) at 15:32

## 2021-12-22 RX ADMIN — INSULIN ASPART 4 UNITS: 100 INJECTION, SOLUTION INTRAVENOUS; SUBCUTANEOUS at 09:18

## 2021-12-22 RX ADMIN — SODIUM CHLORIDE, PRESERVATIVE FREE 10 ML: 5 INJECTION INTRAVENOUS at 21:04

## 2021-12-22 RX ADMIN — DOXYCYCLINE 100 MG: 100 INJECTION, POWDER, LYOPHILIZED, FOR SOLUTION INTRAVENOUS at 11:43

## 2021-12-22 RX ADMIN — IPRATROPIUM BROMIDE AND ALBUTEROL SULFATE 3 ML: 2.5; .5 SOLUTION RESPIRATORY (INHALATION) at 19:37

## 2021-12-22 RX ADMIN — INSULIN ASPART 5 UNITS: 100 INJECTION, SOLUTION INTRAVENOUS; SUBCUTANEOUS at 11:44

## 2021-12-22 RX ADMIN — INSULIN ASPART 4 UNITS: 100 INJECTION, SOLUTION INTRAVENOUS; SUBCUTANEOUS at 17:28

## 2021-12-22 RX ADMIN — FERROUS SULFATE TAB EC 324 MG (65 MG FE EQUIVALENT) 324 MG: 324 (65 FE) TABLET DELAYED RESPONSE at 08:17

## 2021-12-23 VITALS
HEIGHT: 62 IN | TEMPERATURE: 97 F | BODY MASS INDEX: 17.02 KG/M2 | OXYGEN SATURATION: 90 % | HEART RATE: 88 BPM | WEIGHT: 92.5 LBS | RESPIRATION RATE: 18 BRPM | DIASTOLIC BLOOD PRESSURE: 53 MMHG | SYSTOLIC BLOOD PRESSURE: 112 MMHG

## 2021-12-23 LAB
ALBUMIN SERPL-MCNC: 3.5 G/DL (ref 3.5–5.2)
ALBUMIN/GLOB SERPL: 1.1 G/DL
ALP SERPL-CCNC: 69 U/L (ref 39–117)
ALT SERPL W P-5'-P-CCNC: 12 U/L (ref 1–33)
ANION GAP SERPL CALCULATED.3IONS-SCNC: 9 MMOL/L (ref 5–15)
AST SERPL-CCNC: 25 U/L (ref 1–32)
BASOPHILS # BLD AUTO: 0.01 10*3/MM3 (ref 0–0.2)
BASOPHILS NFR BLD AUTO: 0.1 % (ref 0–1.5)
BILIRUB SERPL-MCNC: 0.2 MG/DL (ref 0–1.2)
BUN SERPL-MCNC: 48 MG/DL (ref 8–23)
BUN/CREAT SERPL: 34 (ref 7–25)
CALCIUM SPEC-SCNC: 8.9 MG/DL (ref 8.6–10.5)
CHLORIDE SERPL-SCNC: 95 MMOL/L (ref 98–107)
CO2 SERPL-SCNC: 28 MMOL/L (ref 22–29)
CREAT SERPL-MCNC: 1.41 MG/DL (ref 0.57–1)
DEPRECATED RDW RBC AUTO: 45.1 FL (ref 37–54)
EOSINOPHIL # BLD AUTO: 0 10*3/MM3 (ref 0–0.4)
EOSINOPHIL NFR BLD AUTO: 0 % (ref 0.3–6.2)
ERYTHROCYTE [DISTWIDTH] IN BLOOD BY AUTOMATED COUNT: 14.1 % (ref 12.3–15.4)
GFR SERPL CREATININE-BSD FRML MDRD: 36 ML/MIN/1.73
GLOBULIN UR ELPH-MCNC: 3.2 GM/DL
GLUCOSE BLDC GLUCOMTR-MCNC: 289 MG/DL (ref 70–130)
GLUCOSE BLDC GLUCOMTR-MCNC: 355 MG/DL (ref 70–130)
GLUCOSE SERPL-MCNC: 386 MG/DL (ref 65–99)
HCT VFR BLD AUTO: 33.1 % (ref 34–46.6)
HGB BLD-MCNC: 10.7 G/DL (ref 12–15.9)
IMM GRANULOCYTES # BLD AUTO: 0.07 10*3/MM3 (ref 0–0.05)
IMM GRANULOCYTES NFR BLD AUTO: 0.7 % (ref 0–0.5)
LYMPHOCYTES # BLD AUTO: 0.72 10*3/MM3 (ref 0.7–3.1)
LYMPHOCYTES NFR BLD AUTO: 7.1 % (ref 19.6–45.3)
MCH RBC QN AUTO: 28.5 PG (ref 26.6–33)
MCHC RBC AUTO-ENTMCNC: 32.3 G/DL (ref 31.5–35.7)
MCV RBC AUTO: 88 FL (ref 79–97)
MONOCYTES # BLD AUTO: 0.12 10*3/MM3 (ref 0.1–0.9)
MONOCYTES NFR BLD AUTO: 1.2 % (ref 5–12)
NEUTROPHILS NFR BLD AUTO: 9.18 10*3/MM3 (ref 1.7–7)
NEUTROPHILS NFR BLD AUTO: 90.9 % (ref 42.7–76)
NRBC BLD AUTO-RTO: 0 /100 WBC (ref 0–0.2)
PLATELET # BLD AUTO: 232 10*3/MM3 (ref 140–450)
PMV BLD AUTO: 10.2 FL (ref 6–12)
POTASSIUM SERPL-SCNC: 4.8 MMOL/L (ref 3.5–5.2)
PROT SERPL-MCNC: 6.7 G/DL (ref 6–8.5)
RBC # BLD AUTO: 3.76 10*6/MM3 (ref 3.77–5.28)
SODIUM SERPL-SCNC: 132 MMOL/L (ref 136–145)
WBC NRBC COR # BLD: 10.1 10*3/MM3 (ref 3.4–10.8)

## 2021-12-23 PROCEDURE — 94799 UNLISTED PULMONARY SVC/PX: CPT

## 2021-12-23 PROCEDURE — 80053 COMPREHEN METABOLIC PANEL: CPT | Performed by: FAMILY MEDICINE

## 2021-12-23 PROCEDURE — 94760 N-INVAS EAR/PLS OXIMETRY 1: CPT

## 2021-12-23 PROCEDURE — 82962 GLUCOSE BLOOD TEST: CPT

## 2021-12-23 PROCEDURE — 63710000001 INSULIN ASPART PER 5 UNITS: Performed by: FAMILY MEDICINE

## 2021-12-23 PROCEDURE — 85025 COMPLETE CBC W/AUTO DIFF WBC: CPT | Performed by: FAMILY MEDICINE

## 2021-12-23 RX ORDER — PREDNISONE 20 MG/1
40 TABLET ORAL DAILY
Qty: 8 TABLET | Refills: 0 | Status: SHIPPED | OUTPATIENT
Start: 2021-12-23 | End: 2021-12-27

## 2021-12-23 RX ORDER — CEFUROXIME AXETIL 500 MG/1
500 TABLET ORAL 2 TIMES DAILY
Qty: 8 TABLET | Refills: 0 | Status: SHIPPED | OUTPATIENT
Start: 2021-12-23 | End: 2021-12-27

## 2021-12-23 RX ORDER — DOXYCYCLINE HYCLATE 100 MG/1
100 CAPSULE ORAL 2 TIMES DAILY
Qty: 8 CAPSULE | Refills: 0 | Status: SHIPPED | OUTPATIENT
Start: 2021-12-23 | End: 2021-12-27

## 2021-12-23 RX ORDER — FERROUS SULFATE 325(65) MG
1 TABLET ORAL
Start: 2021-12-23 | End: 2022-01-01

## 2021-12-23 RX ADMIN — FUROSEMIDE 40 MG: 40 TABLET ORAL at 08:20

## 2021-12-23 RX ADMIN — ASPIRIN 81 MG: 81 TABLET, FILM COATED ORAL at 08:20

## 2021-12-23 RX ADMIN — Medication 2000 UNITS: at 08:20

## 2021-12-23 RX ADMIN — SODIUM CHLORIDE, PRESERVATIVE FREE 10 ML: 5 INJECTION INTRAVENOUS at 08:23

## 2021-12-23 RX ADMIN — INSULIN ASPART 6 UNITS: 100 INJECTION, SOLUTION INTRAVENOUS; SUBCUTANEOUS at 08:21

## 2021-12-23 RX ADMIN — IPRATROPIUM BROMIDE AND ALBUTEROL SULFATE 3 ML: 2.5; .5 SOLUTION RESPIRATORY (INHALATION) at 08:42

## 2021-12-23 RX ADMIN — INSULIN ASPART 4 UNITS: 100 INJECTION, SOLUTION INTRAVENOUS; SUBCUTANEOUS at 11:07

## 2021-12-23 RX ADMIN — DOXYCYCLINE 100 MG: 100 INJECTION, POWDER, LYOPHILIZED, FOR SOLUTION INTRAVENOUS at 09:35

## 2021-12-24 ENCOUNTER — READMISSION MANAGEMENT (OUTPATIENT)
Dept: CALL CENTER | Facility: HOSPITAL | Age: 76
End: 2021-12-24

## 2021-12-24 NOTE — OUTREACH NOTE
Prep Survey      Responses   [facility] facility patient discharged from? [facility]   Is LACE score < 7 ? No   Emergency Room discharge w/ pulse ox? No   Eligibility Readm Mgmt   Discharge diagnosis Pneumonia   Does the patient have one of the following disease processes/diagnoses(primary or secondary)? COPD/Pneumonia   Does the patient have Home health ordered? No   Is there a DME ordered? Yes   What DME was ordered? Oxygen per [facility]    Prep survey completed? Yes          Daisy Staton RN

## 2021-12-26 LAB
BACTERIA SPEC AEROBE CULT: NORMAL
BACTERIA SPEC AEROBE CULT: NORMAL

## 2021-12-27 ENCOUNTER — TRANSCRIBE ORDERS (OUTPATIENT)
Dept: CARDIOLOGY | Facility: CLINIC | Age: 76
End: 2021-12-27

## 2021-12-27 DIAGNOSIS — R06.02 SHORTNESS OF BREATH: Primary | ICD-10-CM

## 2021-12-29 ENCOUNTER — READMISSION MANAGEMENT (OUTPATIENT)
Dept: CALL CENTER | Facility: HOSPITAL | Age: 76
End: 2021-12-29

## 2021-12-29 ENCOUNTER — OFFICE VISIT (OUTPATIENT)
Dept: FAMILY MEDICINE CLINIC | Facility: CLINIC | Age: 76
End: 2021-12-29

## 2021-12-29 VITALS
RESPIRATION RATE: 24 BRPM | SYSTOLIC BLOOD PRESSURE: 122 MMHG | WEIGHT: 93.2 LBS | BODY MASS INDEX: 17.15 KG/M2 | DIASTOLIC BLOOD PRESSURE: 66 MMHG | HEIGHT: 62 IN | HEART RATE: 82 BPM | OXYGEN SATURATION: 91 %

## 2021-12-29 DIAGNOSIS — R05.9 COUGH: ICD-10-CM

## 2021-12-29 DIAGNOSIS — Z09 HOSPITAL DISCHARGE FOLLOW-UP: Primary | ICD-10-CM

## 2021-12-29 LAB
QT INTERVAL: 404 MS
QTC INTERVAL: 488 MS

## 2021-12-29 PROCEDURE — 99214 OFFICE O/P EST MOD 30 MIN: CPT | Performed by: NURSE PRACTITIONER

## 2021-12-29 NOTE — OUTREACH NOTE
COPD/PN Week 1 Survey      Responses   [facility] patient discharged from? [facility]   Does the patient have one of the following disease processes/diagnoses(primary or secondary)? COPD/Pneumonia   Was the primary reason for admission: Pneumonia   Week 1 attempt successful? No   Unsuccessful attempts Attempt 1          Kylie Miles RN

## 2021-12-30 ENCOUNTER — TELEPHONE (OUTPATIENT)
Dept: FAMILY MEDICINE CLINIC | Facility: CLINIC | Age: 76
End: 2021-12-30

## 2021-12-30 RX ORDER — DOXYCYCLINE HYCLATE 100 MG/1
100 CAPSULE ORAL 2 TIMES DAILY
Qty: 6 CAPSULE | Refills: 0 | Status: SHIPPED | OUTPATIENT
Start: 2021-12-30 | End: 2022-01-02

## 2021-12-30 RX ORDER — CEFUROXIME AXETIL 500 MG/1
500 TABLET ORAL 2 TIMES DAILY
Qty: 6 TABLET | Refills: 0 | Status: SHIPPED | OUTPATIENT
Start: 2021-12-30 | End: 2022-01-02

## 2021-12-30 NOTE — TELEPHONE ENCOUNTER
Dana's daughter Keara is asking that you please call her back regarding an antibiotic that was supposed to be called in for her mom.   Her number is [phone]

## 2021-12-30 NOTE — TELEPHONE ENCOUNTER
Per LUCIA Bernardo, I called the office of Jose Alfred[facility] Ch[facility] to get the ok to extend the pt's antibiotics for a couple of more days.

## 2022-01-01 ENCOUNTER — DOCUMENTATION (OUTPATIENT)
Dept: PULMONOLOGY | Facility: CLINIC | Age: 77
End: 2022-01-01

## 2022-01-01 ENCOUNTER — PROCEDURE VISIT (OUTPATIENT)
Dept: PULMONOLOGY | Facility: CLINIC | Age: 77
End: 2022-01-01

## 2022-01-01 ENCOUNTER — OFFICE VISIT (OUTPATIENT)
Dept: PULMONOLOGY | Facility: CLINIC | Age: 77
End: 2022-01-01

## 2022-01-01 ENCOUNTER — LAB (OUTPATIENT)
Dept: LAB | Facility: HOSPITAL | Age: 77
End: 2022-01-01

## 2022-01-01 ENCOUNTER — OFFICE VISIT (OUTPATIENT)
Dept: FAMILY MEDICINE CLINIC | Facility: CLINIC | Age: 77
End: 2022-01-01

## 2022-01-01 ENCOUNTER — HOSPITAL ENCOUNTER (OUTPATIENT)
Dept: CT IMAGING | Facility: HOSPITAL | Age: 77
Discharge: HOME | End: 2022-12-20
Admitting: INTERNAL MEDICINE

## 2022-01-01 VITALS
RESPIRATION RATE: 24 BRPM | OXYGEN SATURATION: 92 % | SYSTOLIC BLOOD PRESSURE: 118 MMHG | HEART RATE: 81 BPM | DIASTOLIC BLOOD PRESSURE: 60 MMHG | HEIGHT: 62 IN | BODY MASS INDEX: 17.65 KG/M2 | WEIGHT: 95.9 LBS

## 2022-01-01 VITALS
DIASTOLIC BLOOD PRESSURE: 62 MMHG | HEART RATE: 84 BPM | WEIGHT: 94.8 LBS | BODY MASS INDEX: 17.44 KG/M2 | OXYGEN SATURATION: 92 % | HEIGHT: 62 IN | RESPIRATION RATE: 22 BRPM | SYSTOLIC BLOOD PRESSURE: 124 MMHG

## 2022-01-01 VITALS
DIASTOLIC BLOOD PRESSURE: 68 MMHG | WEIGHT: 95 LBS | SYSTOLIC BLOOD PRESSURE: 134 MMHG | BODY MASS INDEX: 17.48 KG/M2 | HEIGHT: 62 IN | RESPIRATION RATE: 28 BRPM | OXYGEN SATURATION: 92 % | HEART RATE: 110 BPM

## 2022-01-01 VITALS
BODY MASS INDEX: 17.3 KG/M2 | OXYGEN SATURATION: 90 % | WEIGHT: 94 LBS | SYSTOLIC BLOOD PRESSURE: 138 MMHG | DIASTOLIC BLOOD PRESSURE: 70 MMHG | HEIGHT: 62 IN | HEART RATE: 99 BPM

## 2022-01-01 DIAGNOSIS — J42 CHRONIC BRONCHITIS, UNSPECIFIED CHRONIC BRONCHITIS TYPE: ICD-10-CM

## 2022-01-01 DIAGNOSIS — R93.89 ABNORMAL CXR: Primary | ICD-10-CM

## 2022-01-01 DIAGNOSIS — J98.4 RESTRICTIVE LUNG DISEASE: ICD-10-CM

## 2022-01-01 DIAGNOSIS — I10 PRIMARY HYPERTENSION: ICD-10-CM

## 2022-01-01 DIAGNOSIS — E11.69 TYPE 2 DIABETES MELLITUS WITH OTHER SPECIFIED COMPLICATION, WITHOUT LONG-TERM CURRENT USE OF INSULIN: ICD-10-CM

## 2022-01-01 DIAGNOSIS — R06.02 SHORTNESS OF BREATH: ICD-10-CM

## 2022-01-01 DIAGNOSIS — R09.02 HYPOXIA: ICD-10-CM

## 2022-01-01 DIAGNOSIS — J44.1 COPD WITH ACUTE EXACERBATION: ICD-10-CM

## 2022-01-01 DIAGNOSIS — R53.83 FATIGUE, UNSPECIFIED TYPE: ICD-10-CM

## 2022-01-01 DIAGNOSIS — J44.9 CHRONIC OBSTRUCTIVE PULMONARY DISEASE, UNSPECIFIED COPD TYPE: Primary | ICD-10-CM

## 2022-01-01 DIAGNOSIS — J98.4 RESTRICTIVE LUNG DISEASE: Primary | ICD-10-CM

## 2022-01-01 DIAGNOSIS — R93.89 ABNORMAL CXR: ICD-10-CM

## 2022-01-01 DIAGNOSIS — I10 PRIMARY HYPERTENSION: Primary | ICD-10-CM

## 2022-01-01 DIAGNOSIS — J84.10 PULMONARY FIBROSIS: ICD-10-CM

## 2022-01-01 DIAGNOSIS — H61.22 IMPACTED CERUMEN OF LEFT EAR: ICD-10-CM

## 2022-01-01 DIAGNOSIS — E11.69 TYPE 2 DIABETES MELLITUS WITH OTHER SPECIFIED COMPLICATION, WITHOUT LONG-TERM CURRENT USE OF INSULIN: Primary | ICD-10-CM

## 2022-01-01 LAB
ALBUMIN SERPL-MCNC: 4.3 G/DL (ref 3.5–5.2)
ALBUMIN UR-MCNC: 1.3 MG/DL
ALBUMIN/GLOB SERPL: 1.6 G/DL
ALP SERPL-CCNC: 106 U/L (ref 39–117)
ALT SERPL W P-5'-P-CCNC: 13 U/L (ref 1–33)
ANION GAP SERPL CALCULATED.3IONS-SCNC: 10.8 MMOL/L (ref 5–15)
AST SERPL-CCNC: 26 U/L (ref 1–32)
BASOPHILS # BLD AUTO: 0.09 10*3/MM3 (ref 0–0.2)
BASOPHILS NFR BLD AUTO: 0.8 % (ref 0–1.5)
BILIRUB SERPL-MCNC: 0.3 MG/DL (ref 0–1.2)
BUN SERPL-MCNC: 27 MG/DL (ref 8–23)
BUN/CREAT SERPL: 19.7 (ref 7–25)
CALCIUM SPEC-SCNC: 10 MG/DL (ref 8.6–10.5)
CHLORIDE SERPL-SCNC: 102 MMOL/L (ref 98–107)
CHOLEST SERPL-MCNC: 169 MG/DL (ref 0–200)
CO2 SERPL-SCNC: 28.2 MMOL/L (ref 22–29)
CREAT SERPL-MCNC: 1.37 MG/DL (ref 0.57–1)
CREAT UR-MCNC: 125 MG/DL
DEPRECATED RDW RBC AUTO: 41.7 FL (ref 37–54)
EGFRCR SERPLBLD CKD-EPI 2021: 39.9 ML/MIN/1.73
EOSINOPHIL # BLD AUTO: 0.27 10*3/MM3 (ref 0–0.4)
EOSINOPHIL NFR BLD AUTO: 2.5 % (ref 0.3–6.2)
ERYTHROCYTE [DISTWIDTH] IN BLOOD BY AUTOMATED COUNT: 13 % (ref 12.3–15.4)
GLOBULIN UR ELPH-MCNC: 2.7 GM/DL
GLUCOSE SERPL-MCNC: 208 MG/DL (ref 65–99)
HBA1C MFR BLD: 7.3 % (ref 4.8–5.6)
HCT VFR BLD AUTO: 39 % (ref 34–46.6)
HDLC SERPL-MCNC: 74 MG/DL (ref 40–60)
HGB BLD-MCNC: 13.4 G/DL (ref 12–15.9)
IMM GRANULOCYTES # BLD AUTO: 0.05 10*3/MM3 (ref 0–0.05)
IMM GRANULOCYTES NFR BLD AUTO: 0.5 % (ref 0–0.5)
LDLC SERPL CALC-MCNC: 60 MG/DL (ref 0–100)
LDLC/HDLC SERPL: 0.68 {RATIO}
LYMPHOCYTES # BLD AUTO: 1.46 10*3/MM3 (ref 0.7–3.1)
LYMPHOCYTES NFR BLD AUTO: 13.5 % (ref 19.6–45.3)
MCH RBC QN AUTO: 29.7 PG (ref 26.6–33)
MCHC RBC AUTO-ENTMCNC: 34.4 G/DL (ref 31.5–35.7)
MCV RBC AUTO: 86.5 FL (ref 79–97)
MICROALBUMIN/CREAT UR: 10.4 MG/G
MONOCYTES # BLD AUTO: 0.69 10*3/MM3 (ref 0.1–0.9)
MONOCYTES NFR BLD AUTO: 6.4 % (ref 5–12)
NEUTROPHILS NFR BLD AUTO: 76.3 % (ref 42.7–76)
NEUTROPHILS NFR BLD AUTO: 8.23 10*3/MM3 (ref 1.7–7)
NRBC BLD AUTO-RTO: 0.1 /100 WBC (ref 0–0.2)
PLATELET # BLD AUTO: 251 10*3/MM3 (ref 140–450)
PMV BLD AUTO: 11.1 FL (ref 6–12)
POTASSIUM SERPL-SCNC: 4.5 MMOL/L (ref 3.5–5.2)
PROT SERPL-MCNC: 7 G/DL (ref 6–8.5)
RBC # BLD AUTO: 4.51 10*6/MM3 (ref 3.77–5.28)
SODIUM SERPL-SCNC: 141 MMOL/L (ref 136–145)
TRIGL SERPL-MCNC: 223 MG/DL (ref 0–150)
TSH SERPL DL<=0.05 MIU/L-ACNC: 1.18 UIU/ML (ref 0.27–4.2)
VLDLC SERPL-MCNC: 35 MG/DL (ref 5–40)
WBC NRBC COR # BLD: 10.79 10*3/MM3 (ref 3.4–10.8)

## 2022-01-01 PROCEDURE — 83036 HEMOGLOBIN GLYCOSYLATED A1C: CPT

## 2022-01-01 PROCEDURE — 94060 EVALUATION OF WHEEZING: CPT | Performed by: INTERNAL MEDICINE

## 2022-01-01 PROCEDURE — 69210 REMOVE IMPACTED EAR WAX UNI: CPT | Performed by: NURSE PRACTITIONER

## 2022-01-01 PROCEDURE — 94729 DIFFUSING CAPACITY: CPT | Performed by: INTERNAL MEDICINE

## 2022-01-01 PROCEDURE — 80053 COMPREHEN METABOLIC PANEL: CPT

## 2022-01-01 PROCEDURE — 99214 OFFICE O/P EST MOD 30 MIN: CPT | Performed by: NURSE PRACTITIONER

## 2022-01-01 PROCEDURE — 80061 LIPID PANEL: CPT

## 2022-01-01 PROCEDURE — 82570 ASSAY OF URINE CREATININE: CPT

## 2022-01-01 PROCEDURE — 36415 COLL VENOUS BLD VENIPUNCTURE: CPT

## 2022-01-01 PROCEDURE — 85025 COMPLETE CBC W/AUTO DIFF WBC: CPT

## 2022-01-01 PROCEDURE — 82043 UR ALBUMIN QUANTITATIVE: CPT

## 2022-01-01 PROCEDURE — 84443 ASSAY THYROID STIM HORMONE: CPT

## 2022-01-01 PROCEDURE — 94727 GAS DIL/WSHOT DETER LNG VOL: CPT | Performed by: INTERNAL MEDICINE

## 2022-01-01 PROCEDURE — 99204 OFFICE O/P NEW MOD 45 MIN: CPT | Performed by: INTERNAL MEDICINE

## 2022-01-01 PROCEDURE — 71250 CT THORAX DX C-: CPT

## 2022-01-01 RX ORDER — METOPROLOL SUCCINATE 25 MG/1
25 TABLET, EXTENDED RELEASE ORAL DAILY
Qty: 30 TABLET | Refills: 6 | Status: SHIPPED | OUTPATIENT
Start: 2022-01-01

## 2022-01-01 RX ORDER — PREDNISONE 20 MG/1
40 TABLET ORAL DAILY
Qty: 8 TABLET | Refills: 0 | Status: SHIPPED | OUTPATIENT
Start: 2022-01-01 | End: 2022-01-01

## 2022-01-01 RX ORDER — LISINOPRIL 5 MG/1
TABLET ORAL
Qty: 30 TABLET | Refills: 0 | Status: SHIPPED | OUTPATIENT
Start: 2022-01-01 | End: 2022-01-01 | Stop reason: SDUPTHER

## 2022-01-01 RX ORDER — FUROSEMIDE 40 MG/1
40 TABLET ORAL DAILY
Qty: 30 TABLET | Refills: 11 | Status: SHIPPED | OUTPATIENT
Start: 2022-01-01

## 2022-01-01 RX ORDER — LISINOPRIL 5 MG/1
5 TABLET ORAL DAILY
Qty: 30 TABLET | Refills: 0 | Status: SHIPPED | OUTPATIENT
Start: 2022-01-01 | End: 2022-01-01

## 2022-01-01 RX ORDER — METOPROLOL SUCCINATE 25 MG/1
25 TABLET, EXTENDED RELEASE ORAL DAILY
Qty: 90 TABLET | Refills: 1 | Status: SHIPPED | OUTPATIENT
Start: 2022-01-01 | End: 2022-01-01

## 2022-01-01 RX ORDER — LISINOPRIL 5 MG/1
5 TABLET ORAL DAILY
Qty: 30 TABLET | Refills: 0 | Status: SHIPPED | OUTPATIENT
Start: 2022-01-01 | End: 2022-01-01 | Stop reason: SDUPTHER

## 2022-01-01 RX ORDER — LISINOPRIL 5 MG/1
5 TABLET ORAL DAILY
Qty: 30 TABLET | Refills: 11 | Status: SHIPPED | OUTPATIENT
Start: 2022-01-01

## 2022-01-04 ENCOUNTER — READMISSION MANAGEMENT (OUTPATIENT)
Dept: CALL CENTER | Facility: HOSPITAL | Age: 77
End: 2022-01-04

## 2022-01-04 NOTE — OUTREACH NOTE
"COPD/PN Week 1 Survey      Responses   [facility] patient discharged from? [facility]   Does the patient have one of the following disease processes/diagnoses(primary or secondary)? COPD/Pneumonia   Was the primary reason for admission: Pneumonia   Week 1 attempt successful? Yes   Call start time 1637   Call end time 1639   Discharge diagnosis Pneumonia   Is patient permission given to speak with other caregiver? Yes   List who call center can speak with dtr   Meds reviewed with patient/caregiver? Yes   Is the patient having any side effects they believe may be caused by any medication additions or changes? No   Does the patient have all medications ordered at discharge? Yes   Is the patient taking all medications as directed (includes completed medication regime)? Yes   Does the patient have a primary care provider?  Yes   Does the patient have an appointment with their PCP or specialist within 7 days of discharge? Yes   Has the patient kept scheduled appointments due by today? Yes   What DME was ordered? Oxygen per [facility]    DME comments wearing home O2 prn   Pulse Ox monitoring Intermittent   Pulse Ox device source Patient   O2 Sat comments \"in the 90's\"   O2 Sat: education provided Sat levels,  Monitoring frequency,  When to seek care   Psychosocial issues? No   Comments Pt c/o SOA which is her baseline.    Did the patient receive a copy of their discharge instructions? Yes   Nursing interventions Reviewed instructions with patient   What is the patient's perception of their health status since discharge? Improving   Nursing Interventions Nurse provided patient education   Are the patient's immunizations up to date?  Yes   Is the patient/caregiver able to teach back the hierarchy of who to call/visit for symptoms/problems? PCP, Specialist, Home health nurse, Urgent Care, ED, 911 Yes   Is the patient/caregiver able to teach back signs and symptoms of worsening condition: Shortness of breath,  Chest " pain,  Fever/chills   Is the patient/caregiver able to teach back importance of completing antibiotic course of treatment? Yes   Week 1 call completed? Yes          Laura Ty RN

## 2022-01-07 ENCOUNTER — PATIENT ROUNDING (BHMG ONLY) (OUTPATIENT)
Dept: FAMILY MEDICINE CLINIC | Facility: CLINIC | Age: 77
End: 2022-01-07

## 2022-01-12 ENCOUNTER — OFFICE VISIT (OUTPATIENT)
Dept: FAMILY MEDICINE CLINIC | Facility: CLINIC | Age: 77
End: 2022-01-12

## 2022-01-12 VITALS
RESPIRATION RATE: 22 BRPM | DIASTOLIC BLOOD PRESSURE: 60 MMHG | SYSTOLIC BLOOD PRESSURE: 112 MMHG | WEIGHT: 92.7 LBS | OXYGEN SATURATION: 98 % | HEART RATE: 92 BPM | BODY MASS INDEX: 17.06 KG/M2 | HEIGHT: 62 IN

## 2022-01-12 DIAGNOSIS — I50.9 CONGESTIVE HEART FAILURE, UNSPECIFIED HF CHRONICITY, UNSPECIFIED HEART FAILURE TYPE: ICD-10-CM

## 2022-01-12 DIAGNOSIS — J44.1 COPD WITH ACUTE EXACERBATION: ICD-10-CM

## 2022-01-12 DIAGNOSIS — R05.9 COUGH: Primary | ICD-10-CM

## 2022-01-12 DIAGNOSIS — E46 PROTEIN-CALORIE MALNUTRITION, UNSPECIFIED SEVERITY: ICD-10-CM

## 2022-01-12 DIAGNOSIS — Z91.81 RISK FOR FALLS: ICD-10-CM

## 2022-01-12 DIAGNOSIS — E11.69 TYPE 2 DIABETES MELLITUS WITH OTHER SPECIFIED COMPLICATION, WITHOUT LONG-TERM CURRENT USE OF INSULIN: ICD-10-CM

## 2022-01-12 DIAGNOSIS — R53.83 FATIGUE, UNSPECIFIED TYPE: ICD-10-CM

## 2022-01-12 PROCEDURE — 99214 OFFICE O/P EST MOD 30 MIN: CPT | Performed by: NURSE PRACTITIONER

## 2022-01-12 RX ORDER — CALCIUM CARBONATE 160(400)MG
TABLET,CHEWABLE ORAL
Qty: 1 EACH | Refills: 0 | Status: SHIPPED | OUTPATIENT
Start: 2022-01-12

## 2022-01-12 RX ORDER — BENZONATATE 100 MG/1
100 CAPSULE ORAL 3 TIMES DAILY PRN
Qty: 90 CAPSULE | Refills: 2 | Status: SHIPPED | OUTPATIENT
Start: 2022-01-12 | End: 2022-01-01

## 2022-01-12 NOTE — PROGRESS NOTES
Chief Complaint  Breathing Problem (follow up)    Subjective          Dana Ruiz presents to [facility] [place] for follow up regarding her oxygen levels, cough and diabetes. Patient states she is feeling better, she would like for her oxygen tank to be removed from her house.       Breathing Problem  She complains of difficulty breathing. There is no cough or wheezing. Pertinent negatives include no appetite change, chest pain, ear pain, myalgias, sore throat or trouble swallowing.   Diabetes  Pertinent negatives for hypoglycemia include no confusion, dizziness, nervousness/anxiousness, pallor or seizures. Pertinent negatives for diabetes include no chest pain, no polyuria and no weakness.     Outpatient Medications Prior to Visit   Medication Sig Dispense Refill   • albuterol sulfate  (90 Base) MCG/ACT inhaler Inhale 2 puffs Every 6 (Six) Hours As Needed.     • aspirin (aspirin) 81 MG EC tablet Take 81 mg by mouth.     • Cholecalciferol 50 MCG (2000 UT) tablet Take 2,000 Units by mouth.     • ferrous sulfate 325 (65 FE) MG tablet Take 1 tablet by mouth Daily With Breakfast.     • furosemide (LASIX) 40 MG tablet 2 (Two) Times a Day.     • lisinopril (PRINIVIL,ZESTRIL) 5 MG tablet      • metFORMIN (GLUCOPHAGE) 500 MG tablet      • METOPROLOL SUCCINATE ER PO Take  by mouth.       No facility-administered medications prior to visit.       Review of Systems   Constitutional: Negative for activity change, appetite change and chills.   HENT: Negative for congestion, ear pain, sore throat and trouble swallowing.    Eyes: Negative for discharge, itching and visual disturbance.   Respiratory: Negative for apnea, cough and wheezing.    Cardiovascular: Negative for chest pain and leg swelling.   Gastrointestinal: Negative for abdominal distention, constipation, diarrhea and nausea.   Endocrine: Negative for cold intolerance, heat intolerance and polyuria.  "  Genitourinary: Negative for dysuria, frequency and urgency.   Musculoskeletal: Negative for arthralgias, back pain and myalgias.   Skin: Negative for color change, pallor and wound.   Neurological: Negative for dizziness, seizures, syncope, weakness and light-headedness.   Psychiatric/Behavioral: Negative for agitation, confusion and sleep disturbance. The patient is not nervous/anxious.          Objective   Vital Signs:   Visit Vitals  /60 (BP Location: Left arm, Patient Position: Sitting, Cuff Size: Adult)   Pulse 92   Resp 22   Ht 157.5 cm (62\")   Wt 42 kg (92 lb 11.2 oz)   SpO2 98%   BMI 16.96 kg/m²     Physical Exam  Vitals and nursing note reviewed.   Constitutional:       Appearance: She is well-developed.   HENT:      Head: Normocephalic and atraumatic.   Eyes:      General: Lids are normal.      Conjunctiva/sclera: Conjunctivae normal.   Neck:      Thyroid: No thyroid mass or thyromegaly.      Trachea: Trachea normal. No tracheal tenderness.   Cardiovascular:      Rate and Rhythm: Normal rate.      Pulses: Normal pulses.      Heart sounds: Normal heart sounds.   Pulmonary:      Effort: Pulmonary effort is normal. No respiratory distress.      Breath sounds: Normal breath sounds. No wheezing.   Abdominal:      General: There is no distension.      Palpations: Abdomen is soft. There is no mass.   Musculoskeletal:         General: Normal range of motion.      Cervical back: Normal range of motion. No edema.   Lymphadenopathy:      Head:      Right side of head: No submental, submandibular or tonsillar adenopathy.      Left side of head: No submental, submandibular or tonsillar adenopathy.   Skin:     General: Skin is warm and dry.      Coloration: Skin is not pale.      Findings: No abrasion, erythema or lesion.   Neurological:      Mental Status: She is alert and oriented to person, place, and time.   Psychiatric:         Mood and Affect: Mood is not anxious. Affect is not inappropriate.         " Speech: Speech normal.         Behavior: Behavior normal.         Thought Content: Thought content normal.         Judgment: Judgment normal. Judgment is not impulsive.        Result Review :                 Assessment and Plan    Diagnoses and all orders for this visit:    1. Cough (Primary)  -     Walker  -     Miscellaneous DME    2. COPD with acute exacerbation (HCC)  -     Walker  -     Misc. Devices (Rollator Ultra-Light) misc; Use daily as needed  Dispense: 1 each; Refill: 0  -     Miscellaneous DME    3. Risk for falls  -     Misc. Devices (Rollator Ultra-Light) misc; Use daily as needed  Dispense: 1 each; Refill: 0    4. Fatigue, unspecified type  -     Misc. Devices (Rollator Ultra-Light) misc; Use daily as needed  Dispense: 1 each; Refill: 0  -     TSH; Future  -     Comprehensive Metabolic Panel; Future  -     CBC & Differential; Future  -     Lipid Panel; Future  -     Microalbumin / Creatinine Urine Ratio - Urine, Clean Catch; Future  -     Hemoglobin A1c; Future    5. Protein-calorie malnutrition, unspecified severity ([facility])    6. Congestive heart failure, unspecified HF chronicity, unspecified heart failure type ([facility])    7. Type 2 diabetes mellitus with other specified complication, without long-term current use of insulin ([facility])  -     TSH; Future  -     Comprehensive Metabolic Panel; Future  -     CBC & Differential; Future  -     Lipid Panel; Future  -     Microalbumin / Creatinine Urine Ratio - Urine, Clean Catch; Future  -     Hemoglobin A1c; Future    Other orders  -     benzonatate (Tessalon Perles) 100 MG capsule; Take 1 capsule by mouth 3 (Three) Times a Day As Needed for Cough.  Dispense: 90 capsule; Refill: 2          With past medical history and recent illiness,  discussion with patient and daughter, it was determined she could benefit from a rolling walker  She has no other needs at this time, however if in the future she needs medical equipment, instructed to let me know     Order faxed  to remove oxygen, her oxygen has been stating above 93% at home with ambulation, daughter confirms    Fasting blood sugar this morning was 132    Please call the office if you have any issues    If you have trouble breathing go to ER     I spent 30 minutes caring for Dana on this date of service. This time includes time spent by me in the following activities:preparing for the visit, performing a medically appropriate examination and/or evaluation , counseling and educating the patient/family/caregiver, ordering medications, tests, or procedures and documenting information in the medical record  Follow Up   Return in about 3 months (around 4/12/2022), or if symptoms worsen or fail to improve, for Annual physical.  Patient was given instructions and counseling regarding her condition or for health maintenance advice. Please see specific information pulled into the AVS if appropriate.                     This document has been electronically signed by LUCIA Bernardo on January 21, 2022 10:59 CST

## 2022-02-11 RX ORDER — METOPROLOL SUCCINATE 25 MG/1
25 TABLET, EXTENDED RELEASE ORAL DAILY
Qty: 90 TABLET | Refills: 1 | Status: SHIPPED | OUTPATIENT
Start: 2022-02-11 | End: 2022-01-01 | Stop reason: SDUPTHER

## 2022-02-11 NOTE — TELEPHONE ENCOUNTER
Incoming Refill Request      Medication requested (name and dose): METOPROLOL     Pharmacy where request should be sent: [facility]    Additional details provided by patient:     Best call back number:     Does the patient have less than a 3 day supply:  [] Yes  [] No    Liz Stanford Rep  02/11/22, 11:35 CST

## 2022-06-13 NOTE — TELEPHONE ENCOUNTER
Incoming Refill Request      Medication requested (name and dose): Lisinopirl 5 mg    Pharmacy where request should be sent: [facility]    Additional details provided by patient: message left on machine saying no refills on this    Best call back number: [phone]    Does the patient have less than a 3 day supply:  [] Yes  [x] No    Leesa Zee  06/13/22, 10:20 CDT

## 2022-09-21 NOTE — TELEPHONE ENCOUNTER
Incoming Refill Request      Medication requested (name and dose): METOPROLOL     Pharmacy where request should be sent: [facility]     Additional details provided by patient:     Best call back number:     Does the patient have less than a 3 day supply:  [] Yes  [] No    Liz Stanford Rep  09/21/22, 11:20 CDT

## 2022-09-28 NOTE — PROGRESS NOTES
Chief Complaint  Cough (recheck)    Subjective          Dana Ruiz presents to [facility] [place]  With feeling fatigue and out of breath. She has an albuterol inhaler at home and uses it as needed.  Breathing Problem  She complains of cough and difficulty breathing. This is a recurrent problem. The current episode started more than 1 year ago. The problem occurs daily. The problem has been gradually worsening. Associated symptoms include dyspnea on exertion, malaise/fatigue and rhinorrhea. Pertinent negatives include no appetite change or trouble swallowing. Her symptoms are aggravated by any activity. Her symptoms are alleviated by steroid inhaler. She reports minimal improvement on treatment. Her past medical history is significant for COPD.   Diabetes  She presents for her follow-up diabetic visit. She has type 2 diabetes mellitus. Pertinent negatives for hypoglycemia include no confusion, dizziness, nervousness/anxiousness, pallor or seizures. Associated symptoms include fatigue. Pertinent negatives for diabetes include no polyuria and no weakness. Symptoms are stable. Risk factors for coronary artery disease include diabetes mellitus and post-menopausal. Current diabetic treatment includes oral agent (monotherapy). She is compliant with treatment all of the time.   Fatigue  This is a recurrent problem. The current episode started more than 1 month ago. The problem occurs every several days. The problem has been gradually worsening. Associated symptoms include coughing and fatigue. Pertinent negatives include no weakness. The symptoms are aggravated by walking and exertion. She has tried rest for the symptoms. The treatment provided mild relief.     Outpatient Medications Prior to Visit   Medication Sig Dispense Refill   • aspirin 81 MG EC tablet Take 81 mg by mouth.     • Cholecalciferol 50 MCG (2000 UT) tablet Take 2,000 Units by mouth.     • ferrous  "sulfate 325 (65 FE) MG tablet Take 1 tablet by mouth Daily With Breakfast.     • Misc. Devices (Rollator Ultra-Light) misc Use daily as needed 1 each 0   • furosemide (LASIX) 40 MG tablet 2 (Two) Times a Day.     • lisinopril (PRINIVIL,ZESTRIL) 5 MG tablet TAKE ONE TABLET BY MOUTH DAILY 30 tablet 0   • metFORMIN (GLUCOPHAGE) 500 MG tablet      • metoprolol succinate XL (TOPROL-XL) 25 MG 24 hr tablet Take 1 tablet by mouth Daily. 90 tablet 1   • benzonatate (Tessalon Perles) 100 MG capsule Take 1 capsule by mouth 3 (Three) Times a Day As Needed for Cough. 90 capsule 2     No facility-administered medications prior to visit.       Review of Systems   Constitutional: Positive for fatigue and malaise/fatigue. Negative for appetite change.   HENT: Positive for rhinorrhea. Negative for trouble swallowing.    Respiratory: Positive for cough.    Cardiovascular: Positive for dyspnea on exertion.   Endocrine: Negative for polyuria.   Skin: Negative for pallor.   Neurological: Negative for dizziness, seizures and weakness.   Psychiatric/Behavioral: Negative for confusion. The patient is not nervous/anxious.          Objective   Vital Signs:   Visit Vitals  /60 (BP Location: Left arm, Patient Position: Sitting, Cuff Size: Small Adult)   Pulse 81   Resp 24   Ht 157.5 cm (62\")   Wt 43.5 kg (95 lb 14.4 oz)   SpO2 92%   BMI 17.54 kg/m²     Physical Exam  Vitals and nursing note reviewed.   Constitutional:       Appearance: She is well-developed.   HENT:      Head: Normocephalic and atraumatic.   Eyes:      General: Lids are normal.      Conjunctiva/sclera: Conjunctivae normal.   Neck:      Thyroid: No thyroid mass or thyromegaly.      Trachea: Trachea normal. No tracheal tenderness.   Cardiovascular:      Rate and Rhythm: Normal rate.      Pulses: Normal pulses.      Heart sounds: Normal heart sounds.   Pulmonary:      Effort: Pulmonary effort is normal. Tachypnea present. No respiratory distress.      Breath sounds: " Examination of the left-upper field reveals wheezing. Wheezing present.   Abdominal:      General: There is no distension.      Palpations: Abdomen is soft. There is no mass.   Musculoskeletal:         General: Normal range of motion.      Cervical back: Normal range of motion. No edema.   Skin:     General: Skin is warm and dry.      Coloration: Skin is not pale.      Findings: No abrasion, erythema or lesion.   Neurological:      Mental Status: She is alert and oriented to person, place, and time.   Psychiatric:         Mood and Affect: Mood is not anxious. Affect is not inappropriate.         Speech: Speech normal.         Behavior: Behavior normal.         Thought Content: Thought content normal.         Judgment: Judgment normal. Judgment is not impulsive.        Result Review :                 Assessment and Plan    Diagnoses and all orders for this visit:    1. Type 2 diabetes mellitus with other specified complication, without long-term current use of insulin (HCC) (Primary)  -     metFORMIN (GLUCOPHAGE) 500 MG tablet; Take 1 tablet by mouth Daily With Breakfast.  Dispense: 30 tablet; Refill: 11    2. Primary hypertension  -     lisinopril (PRINIVIL,ZESTRIL) 5 MG tablet; Take 1 tablet by mouth Daily.  Dispense: 30 tablet; Refill: 11  -     furosemide (LASIX) 40 MG tablet; Take 1 tablet by mouth Daily.  Dispense: 30 tablet; Refill: 11    3. COPD with acute exacerbation (HCC)      We will stop metoprolol, she hasn't been taking it due to her blood pressure being low.     Continue other medications for now  Monitor blood pressure at home   Please call the office if you have issues    Continue to use albuterol inhaler as needed    Spiriva sample given in the office, please call if these do not help with breathing           Follow Up   Return in about 4 weeks (around 10/26/2022), or if symptoms worsen or fail to improve, for Recheck.  Patient was given instructions and counseling regarding her condition or for  health maintenance advice. Please see specific information pulled into the AVS if appropriate.           This document has been electronically signed by LUCIA Bernardo on September 29, 2022 14:28 CDT

## 2022-10-31 PROBLEM — M15.9 GENERALIZED OSTEOARTHRITIS: Status: ACTIVE | Noted: 2022-01-01

## 2022-10-31 PROBLEM — N18.9 ANEMIA IN CHRONIC KIDNEY DISEASE: Status: ACTIVE | Noted: 2020-12-10

## 2022-10-31 PROBLEM — N18.32 STAGE 3B CHRONIC KIDNEY DISEASE (HCC): Status: ACTIVE | Noted: 2020-12-21

## 2022-10-31 PROBLEM — J44.9 CHRONIC OBSTRUCTIVE PULMONARY DISEASE (HCC): Status: ACTIVE | Noted: 2019-12-09

## 2022-10-31 PROBLEM — D63.1 ANEMIA IN CHRONIC KIDNEY DISEASE: Status: ACTIVE | Noted: 2020-12-10

## 2022-11-28 NOTE — PROGRESS NOTES
Chief Complaint  Hypertension (4 week recheeck) and Shortness of Breath    Subjective          Dana Ruiz presents to [facility] [place] with concerns popping and crackling noises in her left ear.  She also has not heard back from pulmonology regarding testing.  Shortness of Breath  Associated symptoms include ear pain and rhinorrhea. Her past medical history is significant for COPD.   Breathing Problem  She complains of cough and difficulty breathing. This is a recurrent problem. The current episode started more than 1 year ago. The problem occurs daily. The problem has been gradually worsening. Associated symptoms include dyspnea on exertion, ear pain and rhinorrhea. Pertinent negatives include no appetite change or trouble swallowing. Her symptoms are aggravated by any activity. Her symptoms are alleviated by steroid inhaler. She reports minimal improvement on treatment. Her past medical history is significant for COPD.   Fatigue  This is a recurrent problem. The current episode started more than 1 month ago. The problem occurs every several days. The problem has been gradually worsening. Associated symptoms include coughing. The symptoms are aggravated by walking and exertion. She has tried rest for the symptoms. The treatment provided mild relief.   Earache   There is pain in the left ear. This is a new problem. The current episode started in the past 7 days. The problem occurs constantly. The problem has been unchanged. There has been no fever. The patient is experiencing no pain. Associated symptoms include coughing and rhinorrhea. She has tried nothing for the symptoms. The treatment provided no relief.     Outpatient Medications Prior to Visit   Medication Sig Dispense Refill   • aspirin 81 MG EC tablet Take 81 mg by mouth.     • Cholecalciferol 50 MCG (2000 UT) tablet Take 2,000 Units by mouth.     • ferrous sulfate 325 (65 FE) MG tablet Take 1 tablet  "by mouth Daily With Breakfast.     • furosemide (LASIX) 40 MG tablet Take 1 tablet by mouth Daily. 30 tablet 11   • lisinopril (PRINIVIL,ZESTRIL) 5 MG tablet Take 1 tablet by mouth Daily. 30 tablet 11   • metFORMIN (GLUCOPHAGE) 500 MG tablet Take 1 tablet by mouth Daily With Breakfast. 30 tablet 11   • metoprolol succinate XL (Toprol XL) 25 MG 24 hr tablet Take 1 tablet by mouth Daily. 30 tablet 6   • Misc. Devices (Rollator Ultra-Light) misc Use daily as needed 1 each 0   • predniSONE (DELTASONE) 20 MG tablet Take 2 tablets by mouth Daily. 8 tablet 0     No facility-administered medications prior to visit.       Review of Systems   Constitutional: Negative for appetite change.   HENT: Positive for ear pain and rhinorrhea. Negative for trouble swallowing.    Respiratory: Positive for cough.    Cardiovascular: Positive for dyspnea on exertion.         Objective   Vital Signs:   Visit Vitals  /62 (BP Location: Left arm, Patient Position: Sitting, Cuff Size: Adult)   Pulse 84   Resp 22   Ht 157.5 cm (62\")   Wt 43 kg (94 lb 12.8 oz)   SpO2 92%   BMI 17.34 kg/m²     Physical Exam  Vitals and nursing note reviewed.   Constitutional:       Appearance: She is well-developed.   HENT:      Head: Normocephalic and atraumatic.      Left Ear: External ear normal. There is impacted cerumen.   Eyes:      General: Lids are normal.      Conjunctiva/sclera: Conjunctivae normal.   Neck:      Thyroid: No thyroid mass or thyromegaly.      Trachea: Trachea normal. No tracheal tenderness.   Cardiovascular:      Rate and Rhythm: Normal rate.      Pulses: Normal pulses.      Heart sounds: Normal heart sounds.   Pulmonary:      Effort: Pulmonary effort is normal. No respiratory distress.      Breath sounds: Normal breath sounds. No wheezing.   Abdominal:      General: There is no distension.      Palpations: Abdomen is soft. There is no mass.   Musculoskeletal:         General: Normal range of motion.      Cervical back: Normal range " of motion. No edema.   Skin:     General: Skin is warm and dry.      Coloration: Skin is not pale.      Findings: No abrasion, erythema or lesion.   Neurological:      Mental Status: She is alert and oriented to person, place, and time.   Psychiatric:         Mood and Affect: Mood is not anxious. Affect is not inappropriate.         Speech: Speech normal.         Behavior: Behavior normal.         Thought Content: Thought content normal.         Judgment: Judgment normal. Judgment is not impulsive.        Result Review :          Ear Cerumen Removal    Date/Time: 11/28/2022 11:58 AM  Performed by: Madalyn Santiago APRN  Authorized by: Madalyn Santiago APRN   Location details: left ear  Patient tolerance: patient tolerated the procedure well with no immediate complications  Procedure type: instrumentation, irrigation   Sedation:  Patient sedated: no              Assessment and Plan    Diagnoses and all orders for this visit:    1. Chronic obstructive pulmonary disease, unspecified COPD type (HCC) (Primary)    2. Impacted cerumen of left ear  -     Cerumen Removal      Will call pulmonology to help get this scheduled  We will call with appointment time    Cerumen removal in the office successful no issues patient tolerated well    Please call the office if you have any issues    If you have trouble breathing please go to the ER      Follow Up   Return in about 3 months (around 2/28/2023), or if symptoms worsen or fail to improve, for Next scheduled follow up.  Patient was given instructions and counseling regarding her condition or for health maintenance advice. Please see specific information pulled into the AVS if appropriate.           This document has been electronically signed by LUCIA Bernardo on November 29, 2022 12:33 CST

## 2022-12-07 PROBLEM — J98.4 RESTRICTIVE LUNG DISEASE: Status: ACTIVE | Noted: 2022-01-01

## 2022-12-07 PROBLEM — J18.9 PNEUMONIA: Status: RESOLVED | Noted: 2021-12-21 | Resolved: 2022-01-01

## 2022-12-07 PROBLEM — J44.9 CHRONIC OBSTRUCTIVE PULMONARY DISEASE (HCC): Status: RESOLVED | Noted: 2019-12-09 | Resolved: 2022-01-01

## 2022-12-07 NOTE — PROGRESS NOTES
FULL PFT WITH BRONCHODILATOR.     PT VERY SOA.     FAIR EFFORT AND COOPERATION.     6 SECOND EXHALATION ON SPIROMETRY.     ORDERED BY DR. JOSEPH, READ BY DR. JOSEPH

## 2022-12-07 NOTE — PROGRESS NOTES
Orders for home and portable oxygen concentrator have been faxed to [facility].  Using 2 liters of oxygen.

## 2022-12-07 NOTE — PROGRESS NOTES
Pulmonary Consultation    Madalyn Santiago APRN,    Thank you for asking me to see Dana Ruiz for   Chief Complaint   Patient presents with   • Bronchitis   .      History of Present Illness  Dana Ruiz is a 77 y.o. female     12/7/22  Patient tells me she is here because she is not moving air very well, and she gets very winded doing things around the house. She feels like she can't take care of herself anymore  She tells me this started several years ago, she started having cough and allergy symptoms.    She has tried some inhalers in the past. Albuterol was not effective. She was given Spiriva but that didn't help. She was given a Breztri sample recently but hasn't started that  She had supplemental O2 in the past after her pneumonia last year but her O2 sats normalized so she returned it    Lives alone-  in a nursing home. 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 Childhood second hand smoke      Review of Systems: History obtained from chart review and the patient.  Review of Systems   HENT: Positive for postnasal drip, rhinorrhea and sore throat.    Respiratory: Positive for cough and shortness of breath.      As described in the HPI. Otherwise, remainder of ROS (14 systems) were negative.    Patient Active Problem List   Diagnosis   • Hypoxia   • Stage 3b chronic kidney disease (HCC)   • Type 2 diabetes mellitus without complication (HCC)   • Allergic rhinitis   • Anemia in chronic kidney disease   • Benign essential hypertension   • Congestive heart failure (HCC)   • Gastro-esophageal reflux disease with esophagitis   • Generalized osteoarthritis   • Hyperlipidemia   • Restrictive lung disease         Current Outpatient Medications:   •  aspirin 81 MG EC tablet, Take 81 mg by mouth., Disp: , Rfl:   •  Cholecalciferol 50 MCG (2000 UT) tablet, Take 2,000  "Units by mouth., Disp: , Rfl:   •  ferrous sulfate 325 (65 FE) MG tablet, Take 1 tablet by mouth Daily With Breakfast., Disp: , Rfl:   •  furosemide (LASIX) 40 MG tablet, Take 1 tablet by mouth Daily., Disp: 30 tablet, Rfl: 11  •  lisinopril (PRINIVIL,ZESTRIL) 5 MG tablet, Take 1 tablet by mouth Daily., Disp: 30 tablet, Rfl: 11  •  metFORMIN (GLUCOPHAGE) 500 MG tablet, Take 1 tablet by mouth Daily With Breakfast., Disp: 30 tablet, Rfl: 11  •  metoprolol succinate XL (Toprol XL) 25 MG 24 hr tablet, Take 1 tablet by mouth Daily., Disp: 30 tablet, Rfl: 6  •  Misc. Devices (Rollator Ultra-Light) misc, Use daily as needed, Disp: 1 each, Rfl: 0    Allergies   Allergen Reactions   • Polycillin [Ampicillin]        Past Medical History:   Diagnosis Date   • Asthma    • Basal cell carcinoma of left lower eyelid    • Blepharitis     Controlled   • Congestive heart failure (HCC)    • Diabetes mellitus (HCC)     Type 2 diabetes mellitus - no BDR       • Hypercholesterolemia    • Hypertension    • Nuclear senile cataract      Past Surgical History:   Procedure Laterality Date   •  SECTION  1978     Section (2)      • COMBINED HYSTEROSCOPY DIAGNOSTIC / D & C  2005    Hysteroscope procedure (1)      • CYSTOSCOPY  1969    Cystoscopy (1)     • EYELID CARCINOMA EXCISION  2003    Remove eyelid lesion(s) (1)        Social History     Socioeconomic History   • Marital status:    Tobacco Use   • Smoking status: Never   Substance and Sexual Activity   • Alcohol use: No     Family History   Problem Relation Age of Onset   • Cancer Other         other   • Diabetes Other    • Heart disease Other    • Hypertension Other    • Lung cancer Other    • Leukemia Other    • Uterine cancer Other           Objective     Blood pressure 138/70, pulse 99, height 157.5 cm (62\"), weight 42.6 kg (94 lb), SpO2 90 %.  Physical Exam  Vitals reviewed.   Constitutional:       Appearance: Normal appearance. She is " underweight.   HENT:      Head: Normocephalic and atraumatic.      Nose: Nose normal.      Mouth/Throat:      Mouth: Mucous membranes are moist.      Pharynx: Oropharynx is clear.   Eyes:      Conjunctiva/sclera: Conjunctivae normal.      Pupils: Pupils are equal, round, and reactive to light.   Cardiovascular:      Rate and Rhythm: Normal rate and regular rhythm.      Pulses: Normal pulses.      Heart sounds: Normal heart sounds.   Pulmonary:      Effort: Pulmonary effort is normal.      Breath sounds: Normal breath sounds.      Comments: Coughing during exam  Abdominal:      General: Abdomen is flat. Bowel sounds are normal.      Palpations: Abdomen is soft.   Musculoskeletal:         General: Normal range of motion.      Left hand: Deformity present.      Cervical back: Normal range of motion.      Comments: Contracture of the 4th and 5th fingers on the left   Skin:     General: Skin is warm and dry.   Neurological:      General: No focal deficit present.      Mental Status: She is alert and oriented to person, place, and time.   Psychiatric:         Mood and Affect: Mood normal.         Behavior: Behavior normal.         PFTs:  (independently reviewed and interpreted by me)  12/7/22  FVC 1.35L, 55%  FEV1 1.18L, 63%  Ratio 88%  TLC 2.71L, 56%  DLCO 27%    Desat study 12/7/22  Spo2 at rest- 98%  SpO2 w/ ambulation RA- 85%  Spo2 w/ ambulation 2L -94%    Radiology (independently reviewed and interpreted by me):   CXR 12/21/21- hyperinflation, lower lobe interstitial changes, no masses or effusion       Assessment & Plan     Diagnoses and all orders for this visit:    1. Restrictive lung disease (Primary)  -     CT Chest Without Contrast; Future    2. Hypoxia         Discussion/ Recommendations:   Patient does not have any evidence of COPD. She does appear to likely have a form of pulmonary fibrosis based on CXR and PFTs. Needs a CT chest to better tell this though. She also had singificant desaturation with  ambulation today in clinic. Explained she did need to start wearing O2 again with exertion and sleep. Will order her a home and portable concentrator. Due to the deformities in her hand she can't pull a tank. Also her bedroom is upstairs, and needs to have O2 on all levels of the house    -Ct chest and follow up after  -Supplemental O2             Return for f/u CT results.      Thank you for allowing me to participate in the care of Dana Ruiz. Please do not hesitate to contact me with any questions.         This document has been electronically signed by Asiya Gresham DO on December 7, 2022 13:29 CST

## 2023-01-01 ENCOUNTER — LAB (OUTPATIENT)
Dept: LAB | Facility: HOSPITAL | Age: 78
End: 2023-01-01
Payer: MEDICARE

## 2023-01-01 ENCOUNTER — TELEPHONE (OUTPATIENT)
Dept: FAMILY MEDICINE CLINIC | Facility: CLINIC | Age: 78
End: 2023-01-01
Payer: MEDICARE

## 2023-01-01 ENCOUNTER — HOSPITAL ENCOUNTER (OUTPATIENT)
Facility: HOSPITAL | Age: 78
Setting detail: OBSERVATION
Discharge: HOME | End: 2023-03-02
Attending: FAMILY MEDICINE | Admitting: HOSPITALIST
Payer: MEDICARE

## 2023-01-01 ENCOUNTER — APPOINTMENT (OUTPATIENT)
Dept: GENERAL RADIOLOGY | Facility: HOSPITAL | Age: 78
End: 2023-01-01
Payer: MEDICARE

## 2023-01-01 ENCOUNTER — OFFICE VISIT (OUTPATIENT)
Dept: PULMONOLOGY | Facility: CLINIC | Age: 78
End: 2023-01-01
Payer: MEDICARE

## 2023-01-01 ENCOUNTER — APPOINTMENT (OUTPATIENT)
Dept: CT IMAGING | Facility: HOSPITAL | Age: 78
End: 2023-01-01
Payer: MEDICARE

## 2023-01-01 ENCOUNTER — APPOINTMENT (OUTPATIENT)
Dept: CARDIOLOGY | Facility: HOSPITAL | Age: 78
End: 2023-01-01
Payer: MEDICARE

## 2023-01-01 ENCOUNTER — APPOINTMENT (OUTPATIENT)
Dept: ULTRASOUND IMAGING | Facility: HOSPITAL | Age: 78
End: 2023-01-01
Payer: MEDICARE

## 2023-01-01 ENCOUNTER — OFFICE VISIT (OUTPATIENT)
Dept: FAMILY MEDICINE CLINIC | Facility: CLINIC | Age: 78
End: 2023-01-01
Payer: MEDICARE

## 2023-01-01 ENCOUNTER — HOSPITAL ENCOUNTER (OUTPATIENT)
Facility: HOSPITAL | Age: 78
Setting detail: OBSERVATION
End: 2023-03-20
Attending: STUDENT IN AN ORGANIZED HEALTH CARE EDUCATION/TRAINING PROGRAM | Admitting: INTERNAL MEDICINE
Payer: MEDICARE

## 2023-01-01 ENCOUNTER — READMISSION MANAGEMENT (OUTPATIENT)
Dept: CALL CENTER | Facility: HOSPITAL | Age: 78
End: 2023-01-01
Payer: MEDICARE

## 2023-01-01 ENCOUNTER — OFFICE VISIT (OUTPATIENT)
Dept: CARDIOLOGY | Facility: CLINIC | Age: 78
End: 2023-01-01
Payer: MEDICARE

## 2023-01-01 ENCOUNTER — TELEPHONE (OUTPATIENT)
Dept: FAMILY MEDICINE CLINIC | Facility: CLINIC | Age: 78
End: 2023-01-01

## 2023-01-01 ENCOUNTER — DOCUMENTATION (OUTPATIENT)
Dept: PULMONOLOGY | Facility: CLINIC | Age: 78
End: 2023-01-01
Payer: MEDICARE

## 2023-01-01 ENCOUNTER — TRANSITIONAL CARE MANAGEMENT TELEPHONE ENCOUNTER (OUTPATIENT)
Dept: CALL CENTER | Facility: HOSPITAL | Age: 78
End: 2023-01-01
Payer: MEDICARE

## 2023-01-01 VITALS
SYSTOLIC BLOOD PRESSURE: 144 MMHG | WEIGHT: 91.2 LBS | OXYGEN SATURATION: 98 % | BODY MASS INDEX: 16.78 KG/M2 | DIASTOLIC BLOOD PRESSURE: 82 MMHG | HEART RATE: 105 BPM | HEIGHT: 62 IN

## 2023-01-01 VITALS
RESPIRATION RATE: 16 BRPM | HEART RATE: 88 BPM | HEIGHT: 63 IN | OXYGEN SATURATION: 95 % | BODY MASS INDEX: 16.02 KG/M2 | WEIGHT: 90.4 LBS | DIASTOLIC BLOOD PRESSURE: 58 MMHG | TEMPERATURE: 96.3 F | SYSTOLIC BLOOD PRESSURE: 128 MMHG

## 2023-01-01 VITALS
DIASTOLIC BLOOD PRESSURE: 58 MMHG | HEART RATE: 36 BPM | OXYGEN SATURATION: 96 % | HEIGHT: 62 IN | SYSTOLIC BLOOD PRESSURE: 112 MMHG | WEIGHT: 91.7 LBS | BODY MASS INDEX: 16.87 KG/M2

## 2023-01-01 VITALS
BODY MASS INDEX: 17.36 KG/M2 | SYSTOLIC BLOOD PRESSURE: 106 MMHG | OXYGEN SATURATION: 91 % | DIASTOLIC BLOOD PRESSURE: 60 MMHG | HEIGHT: 63 IN | HEART RATE: 57 BPM | WEIGHT: 98 LBS

## 2023-01-01 VITALS
WEIGHT: 92 LBS | HEIGHT: 63 IN | OXYGEN SATURATION: 92 % | HEART RATE: 54 BPM | DIASTOLIC BLOOD PRESSURE: 54 MMHG | BODY MASS INDEX: 16.3 KG/M2 | SYSTOLIC BLOOD PRESSURE: 118 MMHG

## 2023-01-01 VITALS
DIASTOLIC BLOOD PRESSURE: 78 MMHG | OXYGEN SATURATION: 99 % | HEIGHT: 62 IN | HEART RATE: 111 BPM | SYSTOLIC BLOOD PRESSURE: 142 MMHG | WEIGHT: 91 LBS | BODY MASS INDEX: 16.75 KG/M2

## 2023-01-01 VITALS
BODY MASS INDEX: 18.11 KG/M2 | HEART RATE: 108 BPM | RESPIRATION RATE: 32 BRPM | WEIGHT: 98.4 LBS | DIASTOLIC BLOOD PRESSURE: 65 MMHG | HEIGHT: 62 IN | OXYGEN SATURATION: 98 % | TEMPERATURE: 98.2 F | SYSTOLIC BLOOD PRESSURE: 113 MMHG

## 2023-01-01 VITALS
SYSTOLIC BLOOD PRESSURE: 112 MMHG | HEIGHT: 63 IN | WEIGHT: 97 LBS | DIASTOLIC BLOOD PRESSURE: 70 MMHG | HEART RATE: 72 BPM | BODY MASS INDEX: 17.19 KG/M2

## 2023-01-01 VITALS
BODY MASS INDEX: 16.86 KG/M2 | HEIGHT: 62 IN | DIASTOLIC BLOOD PRESSURE: 80 MMHG | SYSTOLIC BLOOD PRESSURE: 118 MMHG | HEART RATE: 98 BPM | WEIGHT: 91.6 LBS | OXYGEN SATURATION: 95 %

## 2023-01-01 DIAGNOSIS — R05.9 COUGH, UNSPECIFIED TYPE: ICD-10-CM

## 2023-01-01 DIAGNOSIS — I10 PRIMARY HYPERTENSION: ICD-10-CM

## 2023-01-01 DIAGNOSIS — R00.0 WIDE-COMPLEX TACHYCARDIA: ICD-10-CM

## 2023-01-01 DIAGNOSIS — J44.9 CHRONIC OBSTRUCTIVE PULMONARY DISEASE, UNSPECIFIED COPD TYPE: ICD-10-CM

## 2023-01-01 DIAGNOSIS — I42.9 CARDIOMYOPATHY, UNSPECIFIED TYPE: Primary | ICD-10-CM

## 2023-01-01 DIAGNOSIS — I10 BENIGN ESSENTIAL HYPERTENSION: Primary | ICD-10-CM

## 2023-01-01 DIAGNOSIS — J98.4 RESTRICTIVE LUNG DISEASE: Primary | ICD-10-CM

## 2023-01-01 DIAGNOSIS — J44.1 COPD EXACERBATION: Primary | ICD-10-CM

## 2023-01-01 DIAGNOSIS — Z74.09 IMPAIRED MOBILITY AND ADLS: ICD-10-CM

## 2023-01-01 DIAGNOSIS — Z74.09 IMPAIRED FUNCTIONAL MOBILITY, BALANCE, GAIT, AND ENDURANCE: ICD-10-CM

## 2023-01-01 DIAGNOSIS — E78.2 MIXED HYPERLIPIDEMIA: ICD-10-CM

## 2023-01-01 DIAGNOSIS — J44.9 CHRONIC OBSTRUCTIVE PULMONARY DISEASE, UNSPECIFIED COPD TYPE: Primary | ICD-10-CM

## 2023-01-01 DIAGNOSIS — I49.3 PVC'S (PREMATURE VENTRICULAR CONTRACTIONS): ICD-10-CM

## 2023-01-01 DIAGNOSIS — J84.10 PULMONARY FIBROSIS: Primary | ICD-10-CM

## 2023-01-01 DIAGNOSIS — E11.69 TYPE 2 DIABETES MELLITUS WITH OTHER SPECIFIED COMPLICATION, WITHOUT LONG-TERM CURRENT USE OF INSULIN: ICD-10-CM

## 2023-01-01 DIAGNOSIS — R53.83 FATIGUE, UNSPECIFIED TYPE: ICD-10-CM

## 2023-01-01 DIAGNOSIS — J44.1 COPD WITH ACUTE EXACERBATION: ICD-10-CM

## 2023-01-01 DIAGNOSIS — I49.1 PAC (PREMATURE ATRIAL CONTRACTION): ICD-10-CM

## 2023-01-01 DIAGNOSIS — E11.69 TYPE 2 DIABETES MELLITUS WITH OTHER SPECIFIED COMPLICATION, WITHOUT LONG-TERM CURRENT USE OF INSULIN: Primary | ICD-10-CM

## 2023-01-01 DIAGNOSIS — I49.9 IRREGULAR CARDIAC RHYTHM: Primary | ICD-10-CM

## 2023-01-01 DIAGNOSIS — R06.02 SHORTNESS OF BREATH: ICD-10-CM

## 2023-01-01 DIAGNOSIS — I10 BENIGN ESSENTIAL HYPERTENSION: ICD-10-CM

## 2023-01-01 DIAGNOSIS — I50.42 CHRONIC COMBINED SYSTOLIC AND DIASTOLIC CONGESTIVE HEART FAILURE: ICD-10-CM

## 2023-01-01 DIAGNOSIS — Z09 HOSPITAL DISCHARGE FOLLOW-UP: Primary | ICD-10-CM

## 2023-01-01 DIAGNOSIS — R06.02 SOB (SHORTNESS OF BREATH): ICD-10-CM

## 2023-01-01 DIAGNOSIS — Z78.9 IMPAIRED MOBILITY AND ADLS: ICD-10-CM

## 2023-01-01 DIAGNOSIS — N17.9 AKI (ACUTE KIDNEY INJURY): Primary | ICD-10-CM

## 2023-01-01 LAB
ALBUMIN SERPL-MCNC: 3.3 G/DL (ref 3.5–5.2)
ALBUMIN SERPL-MCNC: 3.4 G/DL (ref 3.5–5.2)
ALBUMIN SERPL-MCNC: 3.6 G/DL (ref 3.5–5.2)
ALBUMIN SERPL-MCNC: 3.9 G/DL (ref 3.5–5.2)
ALBUMIN/GLOB SERPL: 1.1 G/DL
ALBUMIN/GLOB SERPL: 1.4 G/DL
ALBUMIN/GLOB SERPL: 1.5 G/DL
ALBUMIN/GLOB SERPL: 1.6 G/DL
ALBUMIN/GLOB SERPL: 1.7 G/DL
ALP SERPL-CCNC: 128 U/L (ref 39–117)
ALP SERPL-CCNC: 167 U/L (ref 39–117)
ALP SERPL-CCNC: 170 U/L (ref 39–117)
ALP SERPL-CCNC: 211 U/L (ref 39–117)
ALP SERPL-CCNC: 233 U/L (ref 39–117)
ALP SERPL-CCNC: 233 U/L (ref 39–117)
ALT SERPL W P-5'-P-CCNC: 19 U/L (ref 1–33)
ALT SERPL W P-5'-P-CCNC: 412 U/L (ref 1–33)
ALT SERPL W P-5'-P-CCNC: 543 U/L (ref 1–33)
ALT SERPL W P-5'-P-CCNC: 61 U/L (ref 1–33)
ALT SERPL W P-5'-P-CCNC: 636 U/L (ref 1–33)
ALT SERPL W P-5'-P-CCNC: 98 U/L (ref 1–33)
ANION GAP SERPL CALCULATED.3IONS-SCNC: 10 MMOL/L (ref 5–15)
ANION GAP SERPL CALCULATED.3IONS-SCNC: 12 MMOL/L (ref 5–15)
ANION GAP SERPL CALCULATED.3IONS-SCNC: 13 MMOL/L (ref 5–15)
ANION GAP SERPL CALCULATED.3IONS-SCNC: 13 MMOL/L (ref 5–15)
ANION GAP SERPL CALCULATED.3IONS-SCNC: 23 MMOL/L (ref 5–15)
ANION GAP SERPL CALCULATED.3IONS-SCNC: 25 MMOL/L (ref 5–15)
ANION GAP SERPL CALCULATED.3IONS-SCNC: 26 MMOL/L (ref 5–15)
ANION GAP SERPL CALCULATED.3IONS-SCNC: 26 MMOL/L (ref 5–15)
APTT PPP: 37 SECONDS (ref 20–40.3)
ARTERIAL PATENCY WRIST A: ABNORMAL
AST SERPL-CCNC: 1076 U/L (ref 1–32)
AST SERPL-CCNC: 48 U/L (ref 1–32)
AST SERPL-CCNC: 57 U/L (ref 1–32)
AST SERPL-CCNC: 631 U/L (ref 1–32)
AST SERPL-CCNC: 852 U/L (ref 1–32)
AST SERPL-CCNC: 92 U/L (ref 1–32)
ATMOSPHERIC PRESS: 756 MMHG
BACTERIA UR QL AUTO: ABNORMAL /HPF
BASE EXCESS BLDA CALC-SCNC: -11.2 MMOL/L (ref 0–2)
BASOPHILS # BLD AUTO: 0.01 10*3/MM3 (ref 0–0.2)
BASOPHILS # BLD AUTO: 0.02 10*3/MM3 (ref 0–0.2)
BASOPHILS # BLD AUTO: 0.03 10*3/MM3 (ref 0–0.2)
BASOPHILS # BLD AUTO: 0.04 10*3/MM3 (ref 0–0.2)
BASOPHILS # BLD AUTO: 0.07 10*3/MM3 (ref 0–0.2)
BASOPHILS # BLD AUTO: 0.09 10*3/MM3 (ref 0–0.2)
BASOPHILS NFR BLD AUTO: 0.1 % (ref 0–1.5)
BASOPHILS NFR BLD AUTO: 0.2 % (ref 0–1.5)
BASOPHILS NFR BLD AUTO: 0.7 % (ref 0–1.5)
BASOPHILS NFR BLD AUTO: 1.1 % (ref 0–1.5)
BDY SITE: ABNORMAL
BH CV ECHO MEAS - ACS: 1.65 CM
BH CV ECHO MEAS - AO MAX PG: 4.3 MMHG
BH CV ECHO MEAS - AO MEAN PG: 2.5 MMHG
BH CV ECHO MEAS - AO ROOT DIAM: 2.8 CM
BH CV ECHO MEAS - AO V2 MAX: 103.4 CM/SEC
BH CV ECHO MEAS - AO V2 VTI: 20.9 CM
BH CV ECHO MEAS - AVA(I,D): 1.62 CM2
BH CV ECHO MEAS - EDV(CUBED): 98.3 ML
BH CV ECHO MEAS - EDV(MOD-SP2): 54.2 ML
BH CV ECHO MEAS - EDV(MOD-SP4): 43.3 ML
BH CV ECHO MEAS - EF(MOD-BP): 47.3 %
BH CV ECHO MEAS - EF(MOD-SP2): 49.8 %
BH CV ECHO MEAS - EF(MOD-SP4): 44.1 %
BH CV ECHO MEAS - ESV(CUBED): 47.7 ML
BH CV ECHO MEAS - ESV(MOD-SP2): 27.2 ML
BH CV ECHO MEAS - ESV(MOD-SP4): 24.2 ML
BH CV ECHO MEAS - FS: 21.5 %
BH CV ECHO MEAS - IVS/LVPW: 0.82 CM
BH CV ECHO MEAS - IVSD: 0.78 CM
BH CV ECHO MEAS - LA DIMENSION: 3.4 CM
BH CV ECHO MEAS - LAT PEAK E' VEL: 6.8 CM/SEC
BH CV ECHO MEAS - LV DIASTOLIC VOL/BSA (35-75): 31.7 CM2
BH CV ECHO MEAS - LV MASS(C)D: 132.6 GRAMS
BH CV ECHO MEAS - LV MAX PG: 1.8 MMHG
BH CV ECHO MEAS - LV MEAN PG: 0.72 MMHG
BH CV ECHO MEAS - LV SYSTOLIC VOL/BSA (12-30): 17.7 CM2
BH CV ECHO MEAS - LV V1 MAX: 67 CM/SEC
BH CV ECHO MEAS - LV V1 VTI: 12.4 CM
BH CV ECHO MEAS - LVIDD: 4.6 CM
BH CV ECHO MEAS - LVIDS: 3.6 CM
BH CV ECHO MEAS - LVOT AREA: 2.7 CM2
BH CV ECHO MEAS - LVOT DIAM: 1.86 CM
BH CV ECHO MEAS - LVPWD: 0.96 CM
BH CV ECHO MEAS - MED PEAK E' VEL: 4.2 CM/SEC
BH CV ECHO MEAS - MR MAX PG: 100.5 MMHG
BH CV ECHO MEAS - MR MAX VEL: 501.2 CM/SEC
BH CV ECHO MEAS - MV A MAX VEL: 125.8 CM/SEC
BH CV ECHO MEAS - MV DEC SLOPE: 437 CM/SEC2
BH CV ECHO MEAS - MV E MAX VEL: 76 CM/SEC
BH CV ECHO MEAS - MV E/A: 0.6
BH CV ECHO MEAS - MV MAX PG: 8.1 MMHG
BH CV ECHO MEAS - MV MEAN PG: 3 MMHG
BH CV ECHO MEAS - MV P1/2T: 47 MSEC
BH CV ECHO MEAS - MV V2 VTI: 33.5 CM
BH CV ECHO MEAS - MVA(P1/2T): 4.7 CM2
BH CV ECHO MEAS - MVA(VTI): 1.01 CM2
BH CV ECHO MEAS - PA V2 MAX: 73.8 CM/SEC
BH CV ECHO MEAS - PI END-D VEL: 56.5 CM/SEC
BH CV ECHO MEAS - RAP SYSTOLE: 8 MMHG
BH CV ECHO MEAS - RVDD: 2.9 CM
BH CV ECHO MEAS - RVSP: 70 MMHG
BH CV ECHO MEAS - SI(MOD-SP2): 19.8 ML/M2
BH CV ECHO MEAS - SI(MOD-SP4): 14 ML/M2
BH CV ECHO MEAS - SV(LVOT): 33.8 ML
BH CV ECHO MEAS - SV(MOD-SP2): 27 ML
BH CV ECHO MEAS - SV(MOD-SP4): 19.1 ML
BH CV ECHO MEAS - TAPSE (>1.6): 1.74 CM
BH CV ECHO MEAS - TR MAX PG: 62 MMHG
BH CV ECHO MEAS - TR MAX VEL: 393.6 CM/SEC
BH CV ECHO MEASUREMENTS AVERAGE E/E' RATIO: 13.82
BILIRUB CONJ SERPL-MCNC: 1 MG/DL (ref 0–0.3)
BILIRUB INDIRECT SERPL-MCNC: 0.5 MG/DL
BILIRUB SERPL-MCNC: 0.3 MG/DL (ref 0–1.2)
BILIRUB SERPL-MCNC: 0.4 MG/DL (ref 0–1.2)
BILIRUB SERPL-MCNC: 1.2 MG/DL (ref 0–1.2)
BILIRUB SERPL-MCNC: 1.5 MG/DL (ref 0–1.2)
BILIRUB SERPL-MCNC: 1.6 MG/DL (ref 0–1.2)
BILIRUB SERPL-MCNC: 2.3 MG/DL (ref 0–1.2)
BILIRUB UR QL STRIP: NEGATIVE
BILIRUB UR QL STRIP: NEGATIVE
BUN SERPL-MCNC: 21 MG/DL (ref 8–23)
BUN SERPL-MCNC: 21 MG/DL (ref 8–23)
BUN SERPL-MCNC: 22 MG/DL (ref 8–23)
BUN SERPL-MCNC: 28 MG/DL (ref 8–23)
BUN SERPL-MCNC: 53 MG/DL (ref 8–23)
BUN SERPL-MCNC: 60 MG/DL (ref 8–23)
BUN SERPL-MCNC: 63 MG/DL (ref 8–23)
BUN SERPL-MCNC: 68 MG/DL (ref 8–23)
BUN/CREAT SERPL: 17.2 (ref 7–25)
BUN/CREAT SERPL: 17.3 (ref 7–25)
BUN/CREAT SERPL: 17.9 (ref 7–25)
BUN/CREAT SERPL: 24.3 (ref 7–25)
BUN/CREAT SERPL: 26.9 (ref 7–25)
BUN/CREAT SERPL: 27.1 (ref 7–25)
BUN/CREAT SERPL: 27.6 (ref 7–25)
BUN/CREAT SERPL: 32.1 (ref 7–25)
CALCIUM SPEC-SCNC: 7.4 MG/DL (ref 8.6–10.5)
CALCIUM SPEC-SCNC: 8.8 MG/DL (ref 8.6–10.5)
CALCIUM SPEC-SCNC: 8.9 MG/DL (ref 8.6–10.5)
CALCIUM SPEC-SCNC: 9.2 MG/DL (ref 8.6–10.5)
CALCIUM SPEC-SCNC: 9.3 MG/DL (ref 8.6–10.5)
CALCIUM SPEC-SCNC: 9.3 MG/DL (ref 8.6–10.5)
CALCIUM SPEC-SCNC: 9.4 MG/DL (ref 8.6–10.5)
CALCIUM SPEC-SCNC: 9.4 MG/DL (ref 8.6–10.5)
CHLORIDE SERPL-SCNC: 100 MMOL/L (ref 98–107)
CHLORIDE SERPL-SCNC: 103 MMOL/L (ref 98–107)
CHLORIDE SERPL-SCNC: 90 MMOL/L (ref 98–107)
CHLORIDE SERPL-SCNC: 91 MMOL/L (ref 98–107)
CHLORIDE SERPL-SCNC: 93 MMOL/L (ref 98–107)
CHLORIDE SERPL-SCNC: 95 MMOL/L (ref 98–107)
CHLORIDE SERPL-SCNC: 98 MMOL/L (ref 98–107)
CHLORIDE SERPL-SCNC: 99 MMOL/L (ref 98–107)
CK SERPL-CCNC: 38 U/L (ref 20–180)
CLARITY UR: CLEAR
CLARITY UR: CLEAR
CO2 SERPL-SCNC: 15 MMOL/L (ref 22–29)
CO2 SERPL-SCNC: 17 MMOL/L (ref 22–29)
CO2 SERPL-SCNC: 18 MMOL/L (ref 22–29)
CO2 SERPL-SCNC: 19 MMOL/L (ref 22–29)
CO2 SERPL-SCNC: 24 MMOL/L (ref 22–29)
CO2 SERPL-SCNC: 28 MMOL/L (ref 22–29)
CO2 SERPL-SCNC: 28 MMOL/L (ref 22–29)
CO2 SERPL-SCNC: 30 MMOL/L (ref 22–29)
COLOR UR: YELLOW
COLOR UR: YELLOW
CREAT SERPL-MCNC: 1.15 MG/DL (ref 0.57–1)
CREAT SERPL-MCNC: 1.17 MG/DL (ref 0.57–1)
CREAT SERPL-MCNC: 1.22 MG/DL (ref 0.57–1)
CREAT SERPL-MCNC: 1.27 MG/DL (ref 0.57–1)
CREAT SERPL-MCNC: 1.97 MG/DL (ref 0.57–1)
CREAT SERPL-MCNC: 2.12 MG/DL (ref 0.57–1)
CREAT SERPL-MCNC: 2.21 MG/DL (ref 0.57–1)
CREAT SERPL-MCNC: 2.28 MG/DL (ref 0.57–1)
D-DIMER, QUANTITATIVE (MAD,POW, STR): 800 NG/ML (FEU) (ref 0–780)
D-LACTATE SERPL-SCNC: 5.6 MMOL/L (ref 0.5–2)
D-LACTATE SERPL-SCNC: 6.9 MMOL/L (ref 0.5–2)
D-LACTATE SERPL-SCNC: 9.3 MMOL/L (ref 0.5–2)
DEPRECATED RDW RBC AUTO: 48.5 FL (ref 37–54)
DEPRECATED RDW RBC AUTO: 48.7 FL (ref 37–54)
DEPRECATED RDW RBC AUTO: 52.4 FL (ref 37–54)
DEPRECATED RDW RBC AUTO: 52.6 FL (ref 37–54)
DEPRECATED RDW RBC AUTO: 54.3 FL (ref 37–54)
DEPRECATED RDW RBC AUTO: 54.4 FL (ref 37–54)
EGFRCR SERPLBLD CKD-EPI 2021: 21.5 ML/MIN/1.73
EGFRCR SERPLBLD CKD-EPI 2021: 22.3 ML/MIN/1.73
EGFRCR SERPLBLD CKD-EPI 2021: 23.5 ML/MIN/1.73
EGFRCR SERPLBLD CKD-EPI 2021: 25.6 ML/MIN/1.73
EGFRCR SERPLBLD CKD-EPI 2021: 43.4 ML/MIN/1.73
EGFRCR SERPLBLD CKD-EPI 2021: 45.5 ML/MIN/1.73
EGFRCR SERPLBLD CKD-EPI 2021: 47.9 ML/MIN/1.73
EGFRCR SERPLBLD CKD-EPI 2021: 48.9 ML/MIN/1.73
EOSINOPHIL # BLD AUTO: 0.01 10*3/MM3 (ref 0–0.4)
EOSINOPHIL # BLD AUTO: 0.02 10*3/MM3 (ref 0–0.4)
EOSINOPHIL # BLD AUTO: 0.02 10*3/MM3 (ref 0–0.4)
EOSINOPHIL # BLD AUTO: 0.03 10*3/MM3 (ref 0–0.4)
EOSINOPHIL # BLD AUTO: 0.11 10*3/MM3 (ref 0–0.4)
EOSINOPHIL # BLD AUTO: 0.23 10*3/MM3 (ref 0–0.4)
EOSINOPHIL NFR BLD AUTO: 0.1 % (ref 0.3–6.2)
EOSINOPHIL NFR BLD AUTO: 0.1 % (ref 0.3–6.2)
EOSINOPHIL NFR BLD AUTO: 0.2 % (ref 0.3–6.2)
EOSINOPHIL NFR BLD AUTO: 0.2 % (ref 0.3–6.2)
EOSINOPHIL NFR BLD AUTO: 1.3 % (ref 0.3–6.2)
EOSINOPHIL NFR BLD AUTO: 2.2 % (ref 0.3–6.2)
ERYTHROCYTE [DISTWIDTH] IN BLOOD BY AUTOMATED COUNT: 15 % (ref 12.3–15.4)
ERYTHROCYTE [DISTWIDTH] IN BLOOD BY AUTOMATED COUNT: 15.6 % (ref 12.3–15.4)
ERYTHROCYTE [DISTWIDTH] IN BLOOD BY AUTOMATED COUNT: 16.3 % (ref 12.3–15.4)
ERYTHROCYTE [DISTWIDTH] IN BLOOD BY AUTOMATED COUNT: 16.5 % (ref 12.3–15.4)
ERYTHROCYTE [DISTWIDTH] IN BLOOD BY AUTOMATED COUNT: 16.6 % (ref 12.3–15.4)
ERYTHROCYTE [DISTWIDTH] IN BLOOD BY AUTOMATED COUNT: 17.2 % (ref 12.3–15.4)
GAS FLOW AIRWAY: 52 LPM
GEN 5 2HR TROPONIN T REFLEX: 32 NG/L
GEN 5 2HR TROPONIN T REFLEX: 40 NG/L
GLOBULIN UR ELPH-MCNC: 1.9 GM/DL
GLOBULIN UR ELPH-MCNC: 2.4 GM/DL
GLOBULIN UR ELPH-MCNC: 2.5 GM/DL
GLOBULIN UR ELPH-MCNC: 2.6 GM/DL
GLOBULIN UR ELPH-MCNC: 3.2 GM/DL
GLUCOSE BLDC GLUCOMTR-MCNC: 103 MG/DL (ref 70–130)
GLUCOSE BLDC GLUCOMTR-MCNC: 110 MG/DL (ref 70–130)
GLUCOSE BLDC GLUCOMTR-MCNC: 112 MG/DL (ref 70–130)
GLUCOSE BLDC GLUCOMTR-MCNC: 127 MG/DL (ref 70–130)
GLUCOSE BLDC GLUCOMTR-MCNC: 136 MG/DL (ref 70–130)
GLUCOSE BLDC GLUCOMTR-MCNC: 160 MG/DL (ref 70–130)
GLUCOSE BLDC GLUCOMTR-MCNC: 171 MG/DL (ref 70–130)
GLUCOSE BLDC GLUCOMTR-MCNC: 204 MG/DL (ref 70–130)
GLUCOSE BLDC GLUCOMTR-MCNC: 207 MG/DL (ref 70–130)
GLUCOSE BLDC GLUCOMTR-MCNC: 223 MG/DL (ref 70–130)
GLUCOSE BLDC GLUCOMTR-MCNC: 346 MG/DL (ref 70–130)
GLUCOSE SERPL-MCNC: 108 MG/DL (ref 65–99)
GLUCOSE SERPL-MCNC: 145 MG/DL (ref 65–99)
GLUCOSE SERPL-MCNC: 149 MG/DL (ref 65–99)
GLUCOSE SERPL-MCNC: 210 MG/DL (ref 65–99)
GLUCOSE SERPL-MCNC: 260 MG/DL (ref 65–99)
GLUCOSE SERPL-MCNC: 264 MG/DL (ref 65–99)
GLUCOSE SERPL-MCNC: 296 MG/DL (ref 65–99)
GLUCOSE SERPL-MCNC: 384 MG/DL (ref 65–99)
GLUCOSE UR STRIP-MCNC: ABNORMAL MG/DL
GLUCOSE UR STRIP-MCNC: NEGATIVE MG/DL
HBA1C MFR BLD: 10 % (ref 4.8–5.6)
HCO3 BLDA-SCNC: 14.9 MMOL/L (ref 20–26)
HCT VFR BLD AUTO: 40 % (ref 34–46.6)
HCT VFR BLD AUTO: 41.3 % (ref 34–46.6)
HCT VFR BLD AUTO: 41.6 % (ref 34–46.6)
HCT VFR BLD AUTO: 42.1 % (ref 34–46.6)
HCT VFR BLD AUTO: 42.5 % (ref 34–46.6)
HCT VFR BLD AUTO: 45.9 % (ref 34–46.6)
HGB BLD-MCNC: 12.7 G/DL (ref 12–15.9)
HGB BLD-MCNC: 13 G/DL (ref 12–15.9)
HGB BLD-MCNC: 13.3 G/DL (ref 12–15.9)
HGB BLD-MCNC: 13.5 G/DL (ref 12–15.9)
HGB BLD-MCNC: 13.5 G/DL (ref 12–15.9)
HGB BLD-MCNC: 14.2 G/DL (ref 12–15.9)
HGB UR QL STRIP.AUTO: NEGATIVE
HGB UR QL STRIP.AUTO: NEGATIVE
HOLD SPECIMEN: NORMAL
HYALINE CASTS UR QL AUTO: ABNORMAL /LPF
IMM GRANULOCYTES # BLD AUTO: 0.05 10*3/MM3 (ref 0–0.05)
IMM GRANULOCYTES # BLD AUTO: 0.06 10*3/MM3 (ref 0–0.05)
IMM GRANULOCYTES # BLD AUTO: 0.08 10*3/MM3 (ref 0–0.05)
IMM GRANULOCYTES # BLD AUTO: 0.13 10*3/MM3 (ref 0–0.05)
IMM GRANULOCYTES # BLD AUTO: 0.16 10*3/MM3 (ref 0–0.05)
IMM GRANULOCYTES # BLD AUTO: 0.18 10*3/MM3 (ref 0–0.05)
IMM GRANULOCYTES NFR BLD AUTO: 0.6 % (ref 0–0.5)
IMM GRANULOCYTES NFR BLD AUTO: 0.6 % (ref 0–0.5)
IMM GRANULOCYTES NFR BLD AUTO: 0.8 % (ref 0–0.5)
IMM GRANULOCYTES NFR BLD AUTO: 0.9 % (ref 0–0.5)
IMM GRANULOCYTES NFR BLD AUTO: 1.2 % (ref 0–0.5)
IMM GRANULOCYTES NFR BLD AUTO: 1.3 % (ref 0–0.5)
INR PPP: 2.34 (ref 0.8–1.2)
KETONES UR QL STRIP: NEGATIVE
KETONES UR QL STRIP: NEGATIVE
L PNEUMO1 AG UR QL IA: NEGATIVE
LEFT ATRIUM VOLUME INDEX: 19.1 ML/M2
LEUKOCYTE ESTERASE UR QL STRIP.AUTO: NEGATIVE
LEUKOCYTE ESTERASE UR QL STRIP.AUTO: NEGATIVE
LYMPHOCYTES # BLD AUTO: 0.96 10*3/MM3 (ref 0.7–3.1)
LYMPHOCYTES # BLD AUTO: 1.03 10*3/MM3 (ref 0.7–3.1)
LYMPHOCYTES # BLD AUTO: 1.03 10*3/MM3 (ref 0.7–3.1)
LYMPHOCYTES # BLD AUTO: 1.11 10*3/MM3 (ref 0.7–3.1)
LYMPHOCYTES # BLD AUTO: 1.42 10*3/MM3 (ref 0.7–3.1)
LYMPHOCYTES # BLD AUTO: 1.44 10*3/MM3 (ref 0.7–3.1)
LYMPHOCYTES NFR BLD AUTO: 11.2 % (ref 19.6–45.3)
LYMPHOCYTES NFR BLD AUTO: 13.8 % (ref 19.6–45.3)
LYMPHOCYTES NFR BLD AUTO: 17.5 % (ref 19.6–45.3)
LYMPHOCYTES NFR BLD AUTO: 5.9 % (ref 19.6–45.3)
LYMPHOCYTES NFR BLD AUTO: 7.3 % (ref 19.6–45.3)
LYMPHOCYTES NFR BLD AUTO: 8.5 % (ref 19.6–45.3)
Lab: ABNORMAL
MAGNESIUM SERPL-MCNC: 1.9 MG/DL (ref 1.6–2.4)
MAGNESIUM SERPL-MCNC: 2.5 MG/DL (ref 1.6–2.4)
MAGNESIUM SERPL-MCNC: 2.6 MG/DL (ref 1.6–2.4)
MAXIMAL PREDICTED HEART RATE: 142 BPM
MCH RBC QN AUTO: 28.3 PG (ref 26.6–33)
MCH RBC QN AUTO: 28.4 PG (ref 26.6–33)
MCH RBC QN AUTO: 28.5 PG (ref 26.6–33)
MCH RBC QN AUTO: 28.5 PG (ref 26.6–33)
MCH RBC QN AUTO: 28.9 PG (ref 26.6–33)
MCH RBC QN AUTO: 29.2 PG (ref 26.6–33)
MCHC RBC AUTO-ENTMCNC: 30.9 G/DL (ref 31.5–35.7)
MCHC RBC AUTO-ENTMCNC: 31.3 G/DL (ref 31.5–35.7)
MCHC RBC AUTO-ENTMCNC: 31.5 G/DL (ref 31.5–35.7)
MCHC RBC AUTO-ENTMCNC: 31.8 G/DL (ref 31.5–35.7)
MCHC RBC AUTO-ENTMCNC: 32.1 G/DL (ref 31.5–35.7)
MCHC RBC AUTO-ENTMCNC: 32.5 G/DL (ref 31.5–35.7)
MCV RBC AUTO: 87.4 FL (ref 79–97)
MCV RBC AUTO: 88.3 FL (ref 79–97)
MCV RBC AUTO: 91 FL (ref 79–97)
MCV RBC AUTO: 91.1 FL (ref 79–97)
MCV RBC AUTO: 92 FL (ref 79–97)
MCV RBC AUTO: 92.8 FL (ref 79–97)
MODALITY: ABNORMAL
MONOCYTES # BLD AUTO: 0.39 10*3/MM3 (ref 0.1–0.9)
MONOCYTES # BLD AUTO: 0.48 10*3/MM3 (ref 0.1–0.9)
MONOCYTES # BLD AUTO: 0.49 10*3/MM3 (ref 0.1–0.9)
MONOCYTES # BLD AUTO: 0.5 10*3/MM3 (ref 0.1–0.9)
MONOCYTES # BLD AUTO: 1.02 10*3/MM3 (ref 0.1–0.9)
MONOCYTES # BLD AUTO: 1.11 10*3/MM3 (ref 0.1–0.9)
MONOCYTES NFR BLD AUTO: 3.8 % (ref 5–12)
MONOCYTES NFR BLD AUTO: 4.7 % (ref 5–12)
MONOCYTES NFR BLD AUTO: 4.8 % (ref 5–12)
MONOCYTES NFR BLD AUTO: 5.3 % (ref 5–12)
MONOCYTES NFR BLD AUTO: 6.8 % (ref 5–12)
MONOCYTES NFR BLD AUTO: 7.3 % (ref 5–12)
NEUTROPHILS NFR BLD AUTO: 11.27 10*3/MM3 (ref 1.7–7)
NEUTROPHILS NFR BLD AUTO: 11.75 10*3/MM3 (ref 1.7–7)
NEUTROPHILS NFR BLD AUTO: 14.13 10*3/MM3 (ref 1.7–7)
NEUTROPHILS NFR BLD AUTO: 6.13 10*3/MM3 (ref 1.7–7)
NEUTROPHILS NFR BLD AUTO: 7.56 10*3/MM3 (ref 1.7–7)
NEUTROPHILS NFR BLD AUTO: 74.7 % (ref 42.7–76)
NEUTROPHILS NFR BLD AUTO: 78 % (ref 42.7–76)
NEUTROPHILS NFR BLD AUTO: 8 10*3/MM3 (ref 1.7–7)
NEUTROPHILS NFR BLD AUTO: 82.2 % (ref 42.7–76)
NEUTROPHILS NFR BLD AUTO: 83.8 % (ref 42.7–76)
NEUTROPHILS NFR BLD AUTO: 86.2 % (ref 42.7–76)
NEUTROPHILS NFR BLD AUTO: 86.2 % (ref 42.7–76)
NITRITE UR QL STRIP: NEGATIVE
NITRITE UR QL STRIP: NEGATIVE
NRBC BLD AUTO-RTO: 0 /100 WBC (ref 0–0.2)
NRBC BLD AUTO-RTO: 0.1 /100 WBC (ref 0–0.2)
NRBC BLD AUTO-RTO: 0.3 /100 WBC (ref 0–0.2)
NRBC BLD AUTO-RTO: 0.4 /100 WBC (ref 0–0.2)
NT-PROBNP SERPL-MCNC: 8188 PG/ML (ref 0–1800)
NT-PROBNP SERPL-MCNC: ABNORMAL PG/ML (ref 0–1800)
PCO2 BLDA: 33.5 MM HG (ref 35–45)
PH BLDA: 7.26 PH UNITS (ref 7.35–7.45)
PH UR STRIP.AUTO: 5.5 [PH] (ref 5–9)
PH UR STRIP.AUTO: <=5 [PH] (ref 5–9)
PHOSPHATE SERPL-MCNC: 7.2 MG/DL (ref 2.5–4.5)
PHOSPHATE SERPL-MCNC: 8.8 MG/DL (ref 2.5–4.5)
PLAT MORPH BLD: NORMAL
PLATELET # BLD AUTO: 205 10*3/MM3 (ref 140–450)
PLATELET # BLD AUTO: 276 10*3/MM3 (ref 140–450)
PLATELET # BLD AUTO: 278 10*3/MM3 (ref 140–450)
PLATELET # BLD AUTO: 298 10*3/MM3 (ref 140–450)
PLATELET # BLD AUTO: 334 10*3/MM3 (ref 140–450)
PLATELET # BLD AUTO: 386 10*3/MM3 (ref 140–450)
PMV BLD AUTO: 10.3 FL (ref 6–12)
PMV BLD AUTO: 10.5 FL (ref 6–12)
PMV BLD AUTO: 10.6 FL (ref 6–12)
PMV BLD AUTO: 10.7 FL (ref 6–12)
PMV BLD AUTO: 10.8 FL (ref 6–12)
PMV BLD AUTO: 9.3 FL (ref 6–12)
PO2 BLDA: 160 MM HG (ref 83–108)
POTASSIUM SERPL-SCNC: 3.7 MMOL/L (ref 3.5–5.2)
POTASSIUM SERPL-SCNC: 3.9 MMOL/L (ref 3.5–5.2)
POTASSIUM SERPL-SCNC: 3.9 MMOL/L (ref 3.5–5.2)
POTASSIUM SERPL-SCNC: 4 MMOL/L (ref 3.5–5.2)
POTASSIUM SERPL-SCNC: 4.9 MMOL/L (ref 3.5–5.2)
POTASSIUM SERPL-SCNC: 5 MMOL/L (ref 3.5–5.2)
POTASSIUM SERPL-SCNC: 5.3 MMOL/L (ref 3.5–5.2)
POTASSIUM SERPL-SCNC: 5.3 MMOL/L (ref 3.5–5.2)
PROCALCITONIN SERPL-MCNC: 0.38 NG/ML (ref 0–0.25)
PROT SERPL-MCNC: 5.2 G/DL (ref 6–8.5)
PROT SERPL-MCNC: 5.9 G/DL (ref 6–8.5)
PROT SERPL-MCNC: 6.3 G/DL (ref 6–8.5)
PROT SERPL-MCNC: 6.5 G/DL (ref 6–8.5)
PROT SERPL-MCNC: 6.5 G/DL (ref 6–8.5)
PROT SERPL-MCNC: 6.8 G/DL (ref 6–8.5)
PROT UR QL STRIP: ABNORMAL
PROT UR QL STRIP: ABNORMAL
PROTHROMBIN TIME: 25.8 SECONDS (ref 11.1–15.3)
QT INTERVAL: 338 MS
QT INTERVAL: 348 MS
QT INTERVAL: 350 MS
QT INTERVAL: 350 MS
QT INTERVAL: 388 MS
QT INTERVAL: 410 MS
QTC INTERVAL: 473 MS
QTC INTERVAL: 486 MS
QTC INTERVAL: 487 MS
QTC INTERVAL: 493 MS
QTC INTERVAL: 494 MS
QTC INTERVAL: 498 MS
RBC # BLD AUTO: 4.39 10*6/MM3 (ref 3.77–5.28)
RBC # BLD AUTO: 4.45 10*6/MM3 (ref 3.77–5.28)
RBC # BLD AUTO: 4.67 10*6/MM3 (ref 3.77–5.28)
RBC # BLD AUTO: 4.76 10*6/MM3 (ref 3.77–5.28)
RBC # BLD AUTO: 4.77 10*6/MM3 (ref 3.77–5.28)
RBC # BLD AUTO: 4.99 10*6/MM3 (ref 3.77–5.28)
RBC # UR STRIP: ABNORMAL /HPF
RBC MORPH BLD: NORMAL
REF LAB TEST METHOD: ABNORMAL
S PNEUM AG SPEC QL LA: NEGATIVE
SAO2 % BLDCOA: 99.5 % (ref 94–99)
SODIUM SERPL-SCNC: 131 MMOL/L (ref 136–145)
SODIUM SERPL-SCNC: 135 MMOL/L (ref 136–145)
SODIUM SERPL-SCNC: 136 MMOL/L (ref 136–145)
SODIUM SERPL-SCNC: 136 MMOL/L (ref 136–145)
SODIUM SERPL-SCNC: 139 MMOL/L (ref 136–145)
SODIUM SERPL-SCNC: 141 MMOL/L (ref 136–145)
SP GR UR STRIP: 1.01 (ref 1–1.03)
SP GR UR STRIP: 1.03 (ref 1–1.03)
SQUAMOUS #/AREA URNS HPF: ABNORMAL /HPF
STJ: 2.27 CM
STRESS TARGET HR: 121 BPM
TROPONIN T DELTA: -1 NG/L
TROPONIN T DELTA: 0 NG/L
TROPONIN T SERPL HS-MCNC: 33 NG/L
TROPONIN T SERPL HS-MCNC: 37 NG/L
TROPONIN T SERPL HS-MCNC: 40 NG/L
TROPONIN T SERPL HS-MCNC: 45 NG/L
TSH SERPL DL<=0.05 MIU/L-ACNC: 2.45 UIU/ML (ref 0.27–4.2)
UROBILINOGEN UR QL STRIP: ABNORMAL
UROBILINOGEN UR QL STRIP: ABNORMAL
VENTILATOR MODE: ABNORMAL
WBC # UR STRIP: ABNORMAL /HPF
WBC MORPH BLD: NORMAL
WBC NRBC COR # BLD: 10.26 10*3/MM3 (ref 3.4–10.8)
WBC NRBC COR # BLD: 13.08 10*3/MM3 (ref 3.4–10.8)
WBC NRBC COR # BLD: 14.03 10*3/MM3 (ref 3.4–10.8)
WBC NRBC COR # BLD: 16.38 10*3/MM3 (ref 3.4–10.8)
WBC NRBC COR # BLD: 8.21 10*3/MM3 (ref 3.4–10.8)
WBC NRBC COR # BLD: 9.2 10*3/MM3 (ref 3.4–10.8)
WHOLE BLOOD HOLD COAG: NORMAL
WHOLE BLOOD HOLD SPECIMEN: NORMAL

## 2023-01-01 PROCEDURE — 85025 COMPLETE CBC W/AUTO DIFF WBC: CPT | Performed by: FAMILY MEDICINE

## 2023-01-01 PROCEDURE — 85025 COMPLETE CBC W/AUTO DIFF WBC: CPT | Performed by: INTERNAL MEDICINE

## 2023-01-01 PROCEDURE — 84484 ASSAY OF TROPONIN QUANT: CPT | Performed by: INTERNAL MEDICINE

## 2023-01-01 PROCEDURE — 93005 ELECTROCARDIOGRAM TRACING: CPT | Performed by: NURSE PRACTITIONER

## 2023-01-01 PROCEDURE — 94799 UNLISTED PULMONARY SVC/PX: CPT

## 2023-01-01 PROCEDURE — 83735 ASSAY OF MAGNESIUM: CPT | Performed by: FAMILY MEDICINE

## 2023-01-01 PROCEDURE — G0378 HOSPITAL OBSERVATION PER HR: HCPCS

## 2023-01-01 PROCEDURE — 1159F MED LIST DOCD IN RCRD: CPT | Performed by: NURSE PRACTITIONER

## 2023-01-01 PROCEDURE — 93010 ELECTROCARDIOGRAM REPORT: CPT | Performed by: INTERNAL MEDICINE

## 2023-01-01 PROCEDURE — 96372 THER/PROPH/DIAG INJ SC/IM: CPT

## 2023-01-01 PROCEDURE — 80053 COMPREHEN METABOLIC PANEL: CPT | Performed by: INTERNAL MEDICINE

## 2023-01-01 PROCEDURE — 74018 RADEX ABDOMEN 1 VIEW: CPT

## 2023-01-01 PROCEDURE — 84484 ASSAY OF TROPONIN QUANT: CPT | Performed by: FAMILY MEDICINE

## 2023-01-01 PROCEDURE — 87899 AGENT NOS ASSAY W/OPTIC: CPT | Performed by: NURSE PRACTITIONER

## 2023-01-01 PROCEDURE — 25010000002 CEFEPIME PER 500 MG: Performed by: INTERNAL MEDICINE

## 2023-01-01 PROCEDURE — 85730 THROMBOPLASTIN TIME PARTIAL: CPT | Performed by: INTERNAL MEDICINE

## 2023-01-01 PROCEDURE — 36415 COLL VENOUS BLD VENIPUNCTURE: CPT | Performed by: NURSE PRACTITIONER

## 2023-01-01 PROCEDURE — 84100 ASSAY OF PHOSPHORUS: CPT | Performed by: INTERNAL MEDICINE

## 2023-01-01 PROCEDURE — 96374 THER/PROPH/DIAG INJ IV PUSH: CPT

## 2023-01-01 PROCEDURE — 82962 GLUCOSE BLOOD TEST: CPT

## 2023-01-01 PROCEDURE — 96375 TX/PRO/DX INJ NEW DRUG ADDON: CPT

## 2023-01-01 PROCEDURE — 3078F DIAST BP <80 MM HG: CPT | Performed by: INTERNAL MEDICINE

## 2023-01-01 PROCEDURE — 99214 OFFICE O/P EST MOD 30 MIN: CPT | Performed by: INTERNAL MEDICINE

## 2023-01-01 PROCEDURE — 94640 AIRWAY INHALATION TREATMENT: CPT

## 2023-01-01 PROCEDURE — 25010000002 FUROSEMIDE PER 20 MG: Performed by: PHYSICIAN ASSISTANT

## 2023-01-01 PROCEDURE — 83735 ASSAY OF MAGNESIUM: CPT | Performed by: INTERNAL MEDICINE

## 2023-01-01 PROCEDURE — 25010000002 ONDANSETRON PER 1 MG: Performed by: STUDENT IN AN ORGANIZED HEALTH CARE EDUCATION/TRAINING PROGRAM

## 2023-01-01 PROCEDURE — 99204 OFFICE O/P NEW MOD 45 MIN: CPT | Performed by: INTERNAL MEDICINE

## 2023-01-01 PROCEDURE — 99214 OFFICE O/P EST MOD 30 MIN: CPT | Performed by: NURSE PRACTITIONER

## 2023-01-01 PROCEDURE — 80048 BASIC METABOLIC PNL TOTAL CA: CPT | Performed by: PHYSICIAN ASSISTANT

## 2023-01-01 PROCEDURE — 83605 ASSAY OF LACTIC ACID: CPT | Performed by: NURSE PRACTITIONER

## 2023-01-01 PROCEDURE — 63710000001 INSULIN ASPART PER 5 UNITS: Performed by: INTERNAL MEDICINE

## 2023-01-01 PROCEDURE — 82248 BILIRUBIN DIRECT: CPT | Performed by: INTERNAL MEDICINE

## 2023-01-01 PROCEDURE — 84145 PROCALCITONIN (PCT): CPT | Performed by: NURSE PRACTITIONER

## 2023-01-01 PROCEDURE — 93306 TTE W/DOPPLER COMPLETE: CPT | Performed by: INTERNAL MEDICINE

## 2023-01-01 PROCEDURE — 84484 ASSAY OF TROPONIN QUANT: CPT | Performed by: STUDENT IN AN ORGANIZED HEALTH CARE EDUCATION/TRAINING PROGRAM

## 2023-01-01 PROCEDURE — 94761 N-INVAS EAR/PLS OXIMETRY MLT: CPT

## 2023-01-01 PROCEDURE — 93005 ELECTROCARDIOGRAM TRACING: CPT | Performed by: FAMILY MEDICINE

## 2023-01-01 PROCEDURE — 80053 COMPREHEN METABOLIC PANEL: CPT | Performed by: FAMILY MEDICINE

## 2023-01-01 PROCEDURE — 3078F DIAST BP <80 MM HG: CPT | Performed by: NURSE PRACTITIONER

## 2023-01-01 PROCEDURE — 3074F SYST BP LT 130 MM HG: CPT | Performed by: NURSE PRACTITIONER

## 2023-01-01 PROCEDURE — 99285 EMERGENCY DEPT VISIT HI MDM: CPT

## 2023-01-01 PROCEDURE — 81001 URINALYSIS AUTO W/SCOPE: CPT | Performed by: INTERNAL MEDICINE

## 2023-01-01 PROCEDURE — 97165 OT EVAL LOW COMPLEX 30 MIN: CPT

## 2023-01-01 PROCEDURE — 1159F MED LIST DOCD IN RCRD: CPT | Performed by: INTERNAL MEDICINE

## 2023-01-01 PROCEDURE — 93005 ELECTROCARDIOGRAM TRACING: CPT | Performed by: STUDENT IN AN ORGANIZED HEALTH CARE EDUCATION/TRAINING PROGRAM

## 2023-01-01 PROCEDURE — 25010000002 METHYLPREDNISOLONE PER 125 MG: Performed by: INTERNAL MEDICINE

## 2023-01-01 PROCEDURE — 96376 TX/PRO/DX INJ SAME DRUG ADON: CPT

## 2023-01-01 PROCEDURE — 85379 FIBRIN DEGRADATION QUANT: CPT | Performed by: FAMILY MEDICINE

## 2023-01-01 PROCEDURE — 1160F RVW MEDS BY RX/DR IN RCRD: CPT | Performed by: INTERNAL MEDICINE

## 2023-01-01 PROCEDURE — 25010000002 FUROSEMIDE PER 20 MG: Performed by: INTERNAL MEDICINE

## 2023-01-01 PROCEDURE — 25010000002 VANCOMYCIN 10 G RECONSTITUTED SOLUTION: Performed by: INTERNAL MEDICINE

## 2023-01-01 PROCEDURE — 63710000001 INSULIN ASPART PER 5 UNITS: Performed by: PHYSICIAN ASSISTANT

## 2023-01-01 PROCEDURE — 84443 ASSAY THYROID STIM HORMONE: CPT | Performed by: INTERNAL MEDICINE

## 2023-01-01 PROCEDURE — 93005 ELECTROCARDIOGRAM TRACING: CPT | Performed by: INTERNAL MEDICINE

## 2023-01-01 PROCEDURE — 84100 ASSAY OF PHOSPHORUS: CPT | Performed by: NURSE PRACTITIONER

## 2023-01-01 PROCEDURE — 83735 ASSAY OF MAGNESIUM: CPT | Performed by: NURSE PRACTITIONER

## 2023-01-01 PROCEDURE — 85610 PROTHROMBIN TIME: CPT | Performed by: INTERNAL MEDICINE

## 2023-01-01 PROCEDURE — 81003 URINALYSIS AUTO W/O SCOPE: CPT

## 2023-01-01 PROCEDURE — 99213 OFFICE O/P EST LOW 20 MIN: CPT | Performed by: INTERNAL MEDICINE

## 2023-01-01 PROCEDURE — 93005 ELECTROCARDIOGRAM TRACING: CPT

## 2023-01-01 PROCEDURE — 97162 PT EVAL MOD COMPLEX 30 MIN: CPT

## 2023-01-01 PROCEDURE — 25010000002 ENOXAPARIN PER 10 MG: Performed by: INTERNAL MEDICINE

## 2023-01-01 PROCEDURE — 85025 COMPLETE CBC W/AUTO DIFF WBC: CPT | Performed by: STUDENT IN AN ORGANIZED HEALTH CARE EDUCATION/TRAINING PROGRAM

## 2023-01-01 PROCEDURE — 71046 X-RAY EXAM CHEST 2 VIEWS: CPT

## 2023-01-01 PROCEDURE — 25010000002 ONDANSETRON PER 1 MG: Performed by: INTERNAL MEDICINE

## 2023-01-01 PROCEDURE — 82803 BLOOD GASES ANY COMBINATION: CPT

## 2023-01-01 PROCEDURE — 36415 COLL VENOUS BLD VENIPUNCTURE: CPT

## 2023-01-01 PROCEDURE — 25010000002 HYDROMORPHONE 1 MG/ML SOLUTION: Performed by: INTERNAL MEDICINE

## 2023-01-01 PROCEDURE — 94664 DEMO&/EVAL PT USE INHALER: CPT

## 2023-01-01 PROCEDURE — 71275 CT ANGIOGRAPHY CHEST: CPT

## 2023-01-01 PROCEDURE — 99213 OFFICE O/P EST LOW 20 MIN: CPT | Performed by: NURSE PRACTITIONER

## 2023-01-01 PROCEDURE — 85025 COMPLETE CBC W/AUTO DIFF WBC: CPT | Performed by: NURSE PRACTITIONER

## 2023-01-01 PROCEDURE — 87040 BLOOD CULTURE FOR BACTERIA: CPT | Performed by: INTERNAL MEDICINE

## 2023-01-01 PROCEDURE — 36415 COLL VENOUS BLD VENIPUNCTURE: CPT | Performed by: INTERNAL MEDICINE

## 2023-01-01 PROCEDURE — 83880 ASSAY OF NATRIURETIC PEPTIDE: CPT | Performed by: STUDENT IN AN ORGANIZED HEALTH CARE EDUCATION/TRAINING PROGRAM

## 2023-01-01 PROCEDURE — 3074F SYST BP LT 130 MM HG: CPT | Performed by: INTERNAL MEDICINE

## 2023-01-01 PROCEDURE — 93306 TTE W/DOPPLER COMPLETE: CPT

## 2023-01-01 PROCEDURE — 80053 COMPREHEN METABOLIC PANEL: CPT | Performed by: STUDENT IN AN ORGANIZED HEALTH CARE EDUCATION/TRAINING PROGRAM

## 2023-01-01 PROCEDURE — 76700 US EXAM ABDOM COMPLETE: CPT

## 2023-01-01 PROCEDURE — 83880 ASSAY OF NATRIURETIC PEPTIDE: CPT | Performed by: FAMILY MEDICINE

## 2023-01-01 PROCEDURE — 1160F RVW MEDS BY RX/DR IN RCRD: CPT | Performed by: NURSE PRACTITIONER

## 2023-01-01 PROCEDURE — 84484 ASSAY OF TROPONIN QUANT: CPT | Performed by: PHYSICIAN ASSISTANT

## 2023-01-01 PROCEDURE — 94760 N-INVAS EAR/PLS OXIMETRY 1: CPT

## 2023-01-01 PROCEDURE — 25510000001 IOPAMIDOL PER 1 ML: Performed by: STUDENT IN AN ORGANIZED HEALTH CARE EDUCATION/TRAINING PROGRAM

## 2023-01-01 PROCEDURE — 80053 COMPREHEN METABOLIC PANEL: CPT | Performed by: NURSE PRACTITIONER

## 2023-01-01 PROCEDURE — 83036 HEMOGLOBIN GLYCOSYLATED A1C: CPT | Performed by: NURSE PRACTITIONER

## 2023-01-01 PROCEDURE — 25010000002 MORPHINE PER 10 MG: Performed by: STUDENT IN AN ORGANIZED HEALTH CARE EDUCATION/TRAINING PROGRAM

## 2023-01-01 PROCEDURE — 87449 NOS EACH ORGANISM AG IA: CPT | Performed by: NURSE PRACTITIONER

## 2023-01-01 PROCEDURE — 71045 X-RAY EXAM CHEST 1 VIEW: CPT

## 2023-01-01 PROCEDURE — 36600 WITHDRAWAL OF ARTERIAL BLOOD: CPT

## 2023-01-01 PROCEDURE — 85007 BL SMEAR W/DIFF WBC COUNT: CPT | Performed by: FAMILY MEDICINE

## 2023-01-01 PROCEDURE — 25010000002 METOCLOPRAMIDE PER 10 MG: Performed by: INTERNAL MEDICINE

## 2023-01-01 PROCEDURE — 82550 ASSAY OF CK (CPK): CPT | Performed by: FAMILY MEDICINE

## 2023-01-01 PROCEDURE — 25010000002 FUROSEMIDE PER 20 MG

## 2023-01-01 PROCEDURE — 1111F DSCHRG MED/CURRENT MED MERGE: CPT | Performed by: NURSE PRACTITIONER

## 2023-01-01 RX ORDER — SODIUM CHLORIDE 0.9 % (FLUSH) 0.9 %
10 SYRINGE (ML) INJECTION EVERY 12 HOURS SCHEDULED
Status: DISCONTINUED | OUTPATIENT
Start: 2023-01-01 | End: 2023-01-01 | Stop reason: HOSPADM

## 2023-01-01 RX ORDER — METHYLPREDNISOLONE SODIUM SUCCINATE 125 MG/2ML
125 INJECTION, POWDER, LYOPHILIZED, FOR SOLUTION INTRAMUSCULAR; INTRAVENOUS EVERY 8 HOURS
Status: COMPLETED | OUTPATIENT
Start: 2023-01-01 | End: 2023-01-01

## 2023-01-01 RX ORDER — LORAZEPAM 2 MG/ML
1 CONCENTRATE ORAL
Status: DISCONTINUED | OUTPATIENT
Start: 2023-01-01 | End: 2023-01-01 | Stop reason: HOSPADM

## 2023-01-01 RX ORDER — METOPROLOL SUCCINATE 25 MG/1
25 TABLET, EXTENDED RELEASE ORAL DAILY
Status: DISCONTINUED | OUTPATIENT
Start: 2023-01-01 | End: 2023-01-01 | Stop reason: HOSPADM

## 2023-01-01 RX ORDER — IPRATROPIUM BROMIDE AND ALBUTEROL SULFATE 2.5; .5 MG/3ML; MG/3ML
3 SOLUTION RESPIRATORY (INHALATION) EVERY 4 HOURS PRN
Status: DISCONTINUED | OUTPATIENT
Start: 2023-01-01 | End: 2023-01-01 | Stop reason: HOSPADM

## 2023-01-01 RX ORDER — DIPHENHYDRAMINE HYDROCHLORIDE AND LIDOCAINE HYDROCHLORIDE AND ALUMINUM HYDROXIDE AND MAGNESIUM HYDRO
5 KIT EVERY 4 HOURS PRN
Status: DISCONTINUED | OUTPATIENT
Start: 2023-01-01 | End: 2023-01-01 | Stop reason: HOSPADM

## 2023-01-01 RX ORDER — BISACODYL 10 MG
10 SUPPOSITORY, RECTAL RECTAL DAILY PRN
Status: DISCONTINUED | OUTPATIENT
Start: 2023-01-01 | End: 2023-01-01 | Stop reason: HOSPADM

## 2023-01-01 RX ORDER — LORAZEPAM 2 MG/ML
0.5 CONCENTRATE ORAL
Status: DISCONTINUED | OUTPATIENT
Start: 2023-01-01 | End: 2023-01-01 | Stop reason: HOSPADM

## 2023-01-01 RX ORDER — ONDANSETRON 2 MG/ML
4 INJECTION INTRAMUSCULAR; INTRAVENOUS EVERY 6 HOURS PRN
Status: DISCONTINUED | OUTPATIENT
Start: 2023-01-01 | End: 2023-01-01 | Stop reason: HOSPADM

## 2023-01-01 RX ORDER — BISACODYL 5 MG/1
5 TABLET, DELAYED RELEASE ORAL DAILY PRN
Status: DISCONTINUED | OUTPATIENT
Start: 2023-01-01 | End: 2023-01-01 | Stop reason: HOSPADM

## 2023-01-01 RX ORDER — AMOXICILLIN 250 MG
2 CAPSULE ORAL 2 TIMES DAILY
Status: DISCONTINUED | OUTPATIENT
Start: 2023-01-01 | End: 2023-01-01 | Stop reason: HOSPADM

## 2023-01-01 RX ORDER — LORAZEPAM 2 MG/ML
0.5 INJECTION INTRAMUSCULAR
Status: DISCONTINUED | OUTPATIENT
Start: 2023-01-01 | End: 2023-01-01 | Stop reason: HOSPADM

## 2023-01-01 RX ORDER — CALCIUM CARBONATE 200(500)MG
2 TABLET,CHEWABLE ORAL 2 TIMES DAILY PRN
Status: DISCONTINUED | OUTPATIENT
Start: 2023-01-01 | End: 2023-01-01 | Stop reason: HOSPADM

## 2023-01-01 RX ORDER — ASPIRIN 81 MG/1
81 TABLET ORAL DAILY
Status: DISCONTINUED | OUTPATIENT
Start: 2023-01-01 | End: 2023-01-01 | Stop reason: HOSPADM

## 2023-01-01 RX ORDER — ACETAMINOPHEN 325 MG/1
650 TABLET ORAL EVERY 4 HOURS PRN
Status: DISCONTINUED | OUTPATIENT
Start: 2023-01-01 | End: 2023-01-01 | Stop reason: HOSPADM

## 2023-01-01 RX ORDER — ENOXAPARIN SODIUM 100 MG/ML
30 INJECTION SUBCUTANEOUS EVERY 24 HOURS
Status: DISCONTINUED | OUTPATIENT
Start: 2023-01-01 | End: 2023-01-01 | Stop reason: HOSPADM

## 2023-01-01 RX ORDER — ONDANSETRON 4 MG/1
4 TABLET, FILM COATED ORAL EVERY 6 HOURS PRN
Status: DISCONTINUED | OUTPATIENT
Start: 2023-01-01 | End: 2023-01-01 | Stop reason: HOSPADM

## 2023-01-01 RX ORDER — HALOPERIDOL 5 MG/ML
1 INJECTION INTRAMUSCULAR EVERY 4 HOURS PRN
Status: DISCONTINUED | OUTPATIENT
Start: 2023-01-01 | End: 2023-01-01 | Stop reason: HOSPADM

## 2023-01-01 RX ORDER — ACETAMINOPHEN 650 MG/1
650 SUPPOSITORY RECTAL EVERY 4 HOURS PRN
Status: DISCONTINUED | OUTPATIENT
Start: 2023-01-01 | End: 2023-01-01 | Stop reason: HOSPADM

## 2023-01-01 RX ORDER — LORAZEPAM 2 MG/ML
2 INJECTION INTRAMUSCULAR
Status: DISCONTINUED | OUTPATIENT
Start: 2023-01-01 | End: 2023-01-01 | Stop reason: HOSPADM

## 2023-01-01 RX ORDER — LORAZEPAM 2 MG/ML
1 INJECTION INTRAMUSCULAR
Status: DISCONTINUED | OUTPATIENT
Start: 2023-01-01 | End: 2023-01-01 | Stop reason: HOSPADM

## 2023-01-01 RX ORDER — SODIUM CHLORIDE 9 MG/ML
125 INJECTION, SOLUTION INTRAVENOUS CONTINUOUS
Status: DISCONTINUED | OUTPATIENT
Start: 2023-01-01 | End: 2023-01-01

## 2023-01-01 RX ORDER — SODIUM CHLORIDE 0.9 % (FLUSH) 0.9 %
10 SYRINGE (ML) INJECTION AS NEEDED
Status: DISCONTINUED | OUTPATIENT
Start: 2023-01-01 | End: 2023-01-01 | Stop reason: HOSPADM

## 2023-01-01 RX ORDER — FAMOTIDINE 20 MG/1
20 TABLET, FILM COATED ORAL DAILY
Status: DISCONTINUED | OUTPATIENT
Start: 2023-01-01 | End: 2023-01-01

## 2023-01-01 RX ORDER — CHOLECALCIFEROL (VITAMIN D3) 125 MCG
5 CAPSULE ORAL NIGHTLY PRN
Status: DISCONTINUED | OUTPATIENT
Start: 2023-01-01 | End: 2023-01-01 | Stop reason: HOSPADM

## 2023-01-01 RX ORDER — DIPHENOXYLATE HYDROCHLORIDE AND ATROPINE SULFATE 2.5; .025 MG/1; MG/1
1 TABLET ORAL
Status: DISCONTINUED | OUTPATIENT
Start: 2023-01-01 | End: 2023-01-01 | Stop reason: HOSPADM

## 2023-01-01 RX ORDER — PANTOPRAZOLE SODIUM 40 MG/10ML
40 INJECTION, POWDER, LYOPHILIZED, FOR SOLUTION INTRAVENOUS
Status: DISCONTINUED | OUTPATIENT
Start: 2023-01-01 | End: 2023-01-01 | Stop reason: HOSPADM

## 2023-01-01 RX ORDER — HYDROMORPHONE HCL 110MG/55ML
2 PATIENT CONTROLLED ANALGESIA SYRINGE INTRAVENOUS
Status: DISCONTINUED | OUTPATIENT
Start: 2023-01-01 | End: 2023-01-01 | Stop reason: HOSPADM

## 2023-01-01 RX ORDER — ACETAMINOPHEN 650 MG/1
650 SUPPOSITORY RECTAL EVERY 4 HOURS PRN
Status: DISCONTINUED | OUTPATIENT
Start: 2023-01-01 | End: 2023-01-01 | Stop reason: SDUPTHER

## 2023-01-01 RX ORDER — ACETAMINOPHEN 160 MG/5ML
650 SOLUTION ORAL EVERY 4 HOURS PRN
Status: DISCONTINUED | OUTPATIENT
Start: 2023-01-01 | End: 2023-01-01 | Stop reason: SDUPTHER

## 2023-01-01 RX ORDER — IPRATROPIUM BROMIDE AND ALBUTEROL SULFATE 2.5; .5 MG/3ML; MG/3ML
3 SOLUTION RESPIRATORY (INHALATION)
Status: DISCONTINUED | OUTPATIENT
Start: 2023-01-01 | End: 2023-01-01 | Stop reason: HOSPADM

## 2023-01-01 RX ORDER — HALOPERIDOL 1 MG/1
1 TABLET ORAL EVERY 4 HOURS PRN
Status: DISCONTINUED | OUTPATIENT
Start: 2023-01-01 | End: 2023-01-01 | Stop reason: HOSPADM

## 2023-01-01 RX ORDER — LORAZEPAM 0.5 MG/1
0.5 TABLET ORAL
Status: DISCONTINUED | OUTPATIENT
Start: 2023-01-01 | End: 2023-01-01 | Stop reason: HOSPADM

## 2023-01-01 RX ORDER — DEXTROSE MONOHYDRATE 25 G/50ML
25 INJECTION, SOLUTION INTRAVENOUS
Status: DISCONTINUED | OUTPATIENT
Start: 2023-01-01 | End: 2023-01-01 | Stop reason: HOSPADM

## 2023-01-01 RX ORDER — PREDNISONE 20 MG/1
20 TABLET ORAL DAILY
Qty: 60 TABLET | Refills: 0 | Status: SHIPPED | OUTPATIENT
Start: 2023-01-01 | End: 2023-01-01

## 2023-01-01 RX ORDER — METHYLPREDNISOLONE 4 MG/1
TABLET ORAL
Qty: 21 EACH | Refills: 0 | Status: SHIPPED | OUTPATIENT
Start: 2023-01-01

## 2023-01-01 RX ORDER — FUROSEMIDE 10 MG/ML
40 INJECTION INTRAMUSCULAR; INTRAVENOUS ONCE
Status: COMPLETED | OUTPATIENT
Start: 2023-01-01 | End: 2023-01-01

## 2023-01-01 RX ORDER — FUROSEMIDE 10 MG/ML
60 INJECTION INTRAMUSCULAR; INTRAVENOUS ONCE
Status: COMPLETED | OUTPATIENT
Start: 2023-01-01 | End: 2023-01-01

## 2023-01-01 RX ORDER — HYDROMORPHONE HCL 110MG/55ML
1.5 PATIENT CONTROLLED ANALGESIA SYRINGE INTRAVENOUS
Status: DISCONTINUED | OUTPATIENT
Start: 2023-01-01 | End: 2023-01-01 | Stop reason: HOSPADM

## 2023-01-01 RX ORDER — IPRATROPIUM BROMIDE AND ALBUTEROL SULFATE 2.5; .5 MG/3ML; MG/3ML
3 SOLUTION RESPIRATORY (INHALATION) EVERY 4 HOURS PRN
Qty: 360 ML | Refills: 5 | Status: SHIPPED | OUTPATIENT
Start: 2023-01-01

## 2023-01-01 RX ORDER — HEPARIN SODIUM 5000 [USP'U]/ML
30 INJECTION, SOLUTION INTRAVENOUS; SUBCUTANEOUS AS NEEDED
Status: DISCONTINUED | OUTPATIENT
Start: 2023-01-01 | End: 2023-01-01 | Stop reason: HOSPADM

## 2023-01-01 RX ORDER — FUROSEMIDE 10 MG/ML
20 INJECTION INTRAMUSCULAR; INTRAVENOUS DAILY
Status: DISCONTINUED | OUTPATIENT
Start: 2023-01-01 | End: 2023-01-01 | Stop reason: HOSPADM

## 2023-01-01 RX ORDER — SODIUM CHLORIDE 9 MG/ML
40 INJECTION, SOLUTION INTRAVENOUS AS NEEDED
Status: DISCONTINUED | OUTPATIENT
Start: 2023-01-01 | End: 2023-01-01 | Stop reason: HOSPADM

## 2023-01-01 RX ORDER — NICOTINE POLACRILEX 4 MG
15 LOZENGE BUCCAL
Status: DISCONTINUED | OUTPATIENT
Start: 2023-01-01 | End: 2023-01-01 | Stop reason: HOSPADM

## 2023-01-01 RX ORDER — INSULIN ASPART 100 [IU]/ML
0-9 INJECTION, SOLUTION INTRAVENOUS; SUBCUTANEOUS
Status: DISCONTINUED | OUTPATIENT
Start: 2023-01-01 | End: 2023-01-01 | Stop reason: HOSPADM

## 2023-01-01 RX ORDER — INSULIN ASPART 100 [IU]/ML
0-7 INJECTION, SOLUTION INTRAVENOUS; SUBCUTANEOUS
Status: DISCONTINUED | OUTPATIENT
Start: 2023-01-01 | End: 2023-01-01 | Stop reason: HOSPADM

## 2023-01-01 RX ORDER — FAMOTIDINE 20 MG/1
20 TABLET, FILM COATED ORAL
Status: DISCONTINUED | OUTPATIENT
Start: 2023-01-01 | End: 2023-01-01 | Stop reason: HOSPADM

## 2023-01-01 RX ORDER — BISACODYL 10 MG
10 SUPPOSITORY, RECTAL RECTAL DAILY
Status: DISCONTINUED | OUTPATIENT
Start: 2023-01-01 | End: 2023-01-01 | Stop reason: HOSPADM

## 2023-01-01 RX ORDER — BUDESONIDE 0.5 MG/2ML
0.5 INHALANT ORAL 2 TIMES DAILY
Qty: 60 EACH | Refills: 11 | Status: SHIPPED | OUTPATIENT
Start: 2023-01-01 | End: 2023-01-01 | Stop reason: ALTCHOICE

## 2023-01-01 RX ORDER — LORAZEPAM 2 MG/ML
2 CONCENTRATE ORAL
Status: DISCONTINUED | OUTPATIENT
Start: 2023-01-01 | End: 2023-01-01 | Stop reason: HOSPADM

## 2023-01-01 RX ORDER — ENOXAPARIN SODIUM 100 MG/ML
30 INJECTION SUBCUTANEOUS EVERY 24 HOURS
Status: DISCONTINUED | OUTPATIENT
Start: 2023-01-01 | End: 2023-01-01 | Stop reason: ALTCHOICE

## 2023-01-01 RX ORDER — HEPARIN SODIUM 10000 [USP'U]/100ML
12 INJECTION, SOLUTION INTRAVENOUS
Status: DISCONTINUED | OUTPATIENT
Start: 2023-01-01 | End: 2023-01-01 | Stop reason: HOSPADM

## 2023-01-01 RX ORDER — ACETAMINOPHEN 160 MG/5ML
650 SOLUTION ORAL EVERY 4 HOURS PRN
Status: DISCONTINUED | OUTPATIENT
Start: 2023-01-01 | End: 2023-01-01 | Stop reason: HOSPADM

## 2023-01-01 RX ORDER — TROLAMINE SALICYLATE 10 G/100G
1 CREAM TOPICAL AS NEEDED
COMMUNITY

## 2023-01-01 RX ORDER — ACETAMINOPHEN 500 MG
500 TABLET ORAL EVERY 6 HOURS PRN
Status: DISCONTINUED | OUTPATIENT
Start: 2023-01-01 | End: 2023-01-01

## 2023-01-01 RX ORDER — HALOPERIDOL 2 MG/ML
1 SOLUTION ORAL EVERY 4 HOURS PRN
Status: DISCONTINUED | OUTPATIENT
Start: 2023-01-01 | End: 2023-01-01 | Stop reason: HOSPADM

## 2023-01-01 RX ORDER — ACETAMINOPHEN 500 MG
500 TABLET ORAL EVERY 6 HOURS PRN
COMMUNITY

## 2023-01-01 RX ORDER — POLYETHYLENE GLYCOL 3350 17 G/17G
17 POWDER, FOR SOLUTION ORAL DAILY PRN
Status: DISCONTINUED | OUTPATIENT
Start: 2023-01-01 | End: 2023-01-01 | Stop reason: HOSPADM

## 2023-01-01 RX ORDER — ONDANSETRON 2 MG/ML
4 INJECTION INTRAMUSCULAR; INTRAVENOUS ONCE
Status: COMPLETED | OUTPATIENT
Start: 2023-01-01 | End: 2023-01-01

## 2023-01-01 RX ORDER — LORAZEPAM 1 MG/1
1 TABLET ORAL
Status: DISCONTINUED | OUTPATIENT
Start: 2023-01-01 | End: 2023-01-01 | Stop reason: HOSPADM

## 2023-01-01 RX ORDER — IPRATROPIUM BROMIDE AND ALBUTEROL SULFATE 2.5; .5 MG/3ML; MG/3ML
3 SOLUTION RESPIRATORY (INHALATION)
Status: DISCONTINUED | OUTPATIENT
Start: 2023-01-01 | End: 2023-01-01 | Stop reason: SDUPTHER

## 2023-01-01 RX ORDER — BUDESONIDE 0.5 MG/2ML
0.5 INHALANT ORAL
Status: DISCONTINUED | OUTPATIENT
Start: 2023-01-01 | End: 2023-01-01 | Stop reason: HOSPADM

## 2023-01-01 RX ORDER — ECHINACEA PURPUREA EXTRACT 125 MG
2 TABLET ORAL AS NEEDED
Status: DISCONTINUED | OUTPATIENT
Start: 2023-01-01 | End: 2023-01-01 | Stop reason: HOSPADM

## 2023-01-01 RX ORDER — CARBOXYMETHYLCELLULOSE SODIUM 5 MG/ML
1 SOLUTION/ DROPS OPHTHALMIC
Status: DISCONTINUED | OUTPATIENT
Start: 2023-01-01 | End: 2023-01-01 | Stop reason: HOSPADM

## 2023-01-01 RX ORDER — NITROGLYCERIN 0.4 MG/1
0.4 TABLET SUBLINGUAL
Status: DISCONTINUED | OUTPATIENT
Start: 2023-01-01 | End: 2023-01-01 | Stop reason: HOSPADM

## 2023-01-01 RX ORDER — HEPARIN SODIUM 5000 [USP'U]/ML
60 INJECTION, SOLUTION INTRAVENOUS; SUBCUTANEOUS AS NEEDED
Status: DISCONTINUED | OUTPATIENT
Start: 2023-01-01 | End: 2023-01-01 | Stop reason: HOSPADM

## 2023-01-01 RX ORDER — METOCLOPRAMIDE HYDROCHLORIDE 5 MG/ML
5 INJECTION INTRAMUSCULAR; INTRAVENOUS EVERY 6 HOURS
Status: DISCONTINUED | OUTPATIENT
Start: 2023-01-01 | End: 2023-01-01 | Stop reason: HOSPADM

## 2023-01-01 RX ORDER — LORAZEPAM 2 MG/1
2 TABLET ORAL
Status: DISCONTINUED | OUTPATIENT
Start: 2023-01-01 | End: 2023-01-01 | Stop reason: HOSPADM

## 2023-01-01 RX ORDER — ACETAMINOPHEN 325 MG/1
650 TABLET ORAL EVERY 4 HOURS PRN
Status: DISCONTINUED | OUTPATIENT
Start: 2023-01-01 | End: 2023-01-01 | Stop reason: SDUPTHER

## 2023-01-01 RX ORDER — CYCLOBENZAPRINE HCL 10 MG
10 TABLET ORAL 3 TIMES DAILY PRN
Status: DISCONTINUED | OUTPATIENT
Start: 2023-01-01 | End: 2023-01-01 | Stop reason: HOSPADM

## 2023-01-01 RX ADMIN — METOPROLOL SUCCINATE 25 MG: 25 TABLET, FILM COATED, EXTENDED RELEASE ORAL at 09:09

## 2023-01-01 RX ADMIN — Medication 10 ML: at 23:25

## 2023-01-01 RX ADMIN — IPRATROPIUM BROMIDE AND ALBUTEROL SULFATE 3 ML: 2.5; .5 SOLUTION RESPIRATORY (INHALATION) at 16:37

## 2023-01-01 RX ADMIN — ONDANSETRON 4 MG: 2 INJECTION INTRAMUSCULAR; INTRAVENOUS at 07:54

## 2023-01-01 RX ADMIN — IOPAMIDOL 64 ML: 755 INJECTION, SOLUTION INTRAVENOUS at 18:13

## 2023-01-01 RX ADMIN — FAMOTIDINE 20 MG: 20 TABLET ORAL at 18:28

## 2023-01-01 RX ADMIN — ENOXAPARIN SODIUM 30 MG: 30 INJECTION SUBCUTANEOUS at 11:37

## 2023-01-01 RX ADMIN — BUDESONIDE 0.5 MG: 0.5 SUSPENSION RESPIRATORY (INHALATION) at 20:36

## 2023-01-01 RX ADMIN — ASPIRIN 81 MG: 81 TABLET, COATED ORAL at 10:22

## 2023-01-01 RX ADMIN — ONDANSETRON 4 MG: 2 INJECTION INTRAMUSCULAR; INTRAVENOUS at 20:30

## 2023-01-01 RX ADMIN — FUROSEMIDE 60 MG: 10 INJECTION, SOLUTION INTRAMUSCULAR; INTRAVENOUS at 17:13

## 2023-01-01 RX ADMIN — METOPROLOL SUCCINATE 25 MG: 25 TABLET, FILM COATED, EXTENDED RELEASE ORAL at 10:22

## 2023-01-01 RX ADMIN — IPRATROPIUM BROMIDE AND ALBUTEROL SULFATE 3 ML: 2.5; .5 SOLUTION RESPIRATORY (INHALATION) at 12:33

## 2023-01-01 RX ADMIN — METHYLPREDNISOLONE SODIUM SUCCINATE 125 MG: 125 INJECTION, POWDER, FOR SOLUTION INTRAMUSCULAR; INTRAVENOUS at 10:27

## 2023-01-01 RX ADMIN — HYDROMORPHONE HYDROCHLORIDE 1 MG: 1 INJECTION, SOLUTION INTRAMUSCULAR; INTRAVENOUS; SUBCUTANEOUS at 04:12

## 2023-01-01 RX ADMIN — METOCLOPRAMIDE 5 MG: 5 INJECTION, SOLUTION INTRAMUSCULAR; INTRAVENOUS at 10:27

## 2023-01-01 RX ADMIN — INSULIN ASPART 2 UNITS: 100 INJECTION, SOLUTION INTRAVENOUS; SUBCUTANEOUS at 11:18

## 2023-01-01 RX ADMIN — Medication 10 ML: at 23:29

## 2023-01-01 RX ADMIN — SODIUM ZIRCONIUM CYCLOSILICATE 5 G: 5 POWDER, FOR SUSPENSION ORAL at 11:37

## 2023-01-01 RX ADMIN — METFORMIN HYDROCHLORIDE 500 MG: 500 TABLET ORAL at 07:35

## 2023-01-01 RX ADMIN — SODIUM CHLORIDE 1000 ML: 9 INJECTION, SOLUTION INTRAVENOUS at 18:42

## 2023-01-01 RX ADMIN — ONDANSETRON 4 MG: 2 INJECTION INTRAMUSCULAR; INTRAVENOUS at 23:20

## 2023-01-01 RX ADMIN — METOPROLOL TARTRATE 5 MG: 5 INJECTION INTRAVENOUS at 16:56

## 2023-01-01 RX ADMIN — MORPHINE SULFATE 4 MG: 4 INJECTION, SOLUTION INTRAMUSCULAR; INTRAVENOUS at 20:30

## 2023-01-01 RX ADMIN — BUDESONIDE 0.5 MG: 0.5 SUSPENSION RESPIRATORY (INHALATION) at 06:52

## 2023-01-01 RX ADMIN — METHYLPREDNISOLONE SODIUM SUCCINATE 125 MG: 125 INJECTION, POWDER, FOR SOLUTION INTRAMUSCULAR; INTRAVENOUS at 18:42

## 2023-01-01 RX ADMIN — INSULIN ASPART 4 UNITS: 100 INJECTION, SOLUTION INTRAVENOUS; SUBCUTANEOUS at 11:38

## 2023-01-01 RX ADMIN — ACETAMINOPHEN 650 MG: 325 TABLET ORAL at 09:16

## 2023-01-01 RX ADMIN — CEFEPIME 2 G: 2 INJECTION, POWDER, FOR SOLUTION INTRAVENOUS at 00:45

## 2023-01-01 RX ADMIN — FUROSEMIDE 20 MG: 10 INJECTION, SOLUTION INTRAMUSCULAR; INTRAVENOUS at 09:13

## 2023-01-01 RX ADMIN — METOCLOPRAMIDE 5 MG: 5 INJECTION, SOLUTION INTRAMUSCULAR; INTRAVENOUS at 18:28

## 2023-01-01 RX ADMIN — ASPIRIN 81 MG: 81 TABLET, FILM COATED ORAL at 09:12

## 2023-01-01 RX ADMIN — Medication 10 ML: at 09:19

## 2023-01-01 RX ADMIN — ONDANSETRON 4 MG: 2 INJECTION INTRAMUSCULAR; INTRAVENOUS at 20:18

## 2023-01-01 RX ADMIN — PANTOPRAZOLE SODIUM 40 MG: 40 INJECTION, POWDER, FOR SOLUTION INTRAVENOUS at 20:18

## 2023-01-01 RX ADMIN — BUDESONIDE 0.5 MG: 0.5 SUSPENSION RESPIRATORY (INHALATION) at 07:36

## 2023-01-01 RX ADMIN — SODIUM CHLORIDE 1000 ML: 9 INJECTION, SOLUTION INTRAVENOUS at 20:29

## 2023-01-01 RX ADMIN — Medication 10 ML: at 21:35

## 2023-01-01 RX ADMIN — Medication 10 ML: at 09:16

## 2023-01-01 RX ADMIN — ASPIRIN 81 MG: 81 TABLET, FILM COATED ORAL at 09:09

## 2023-01-01 RX ADMIN — INSULIN ASPART 7 UNITS: 100 INJECTION, SOLUTION INTRAVENOUS; SUBCUTANEOUS at 07:53

## 2023-01-01 RX ADMIN — METOCLOPRAMIDE 5 MG: 5 INJECTION, SOLUTION INTRAMUSCULAR; INTRAVENOUS at 23:31

## 2023-01-01 RX ADMIN — BUDESONIDE 0.5 MG: 0.5 SUSPENSION RESPIRATORY (INHALATION) at 20:10

## 2023-01-01 RX ADMIN — METFORMIN HYDROCHLORIDE 500 MG: 500 TABLET ORAL at 09:12

## 2023-01-01 RX ADMIN — METHYLPREDNISOLONE SODIUM SUCCINATE 125 MG: 125 INJECTION, POWDER, FOR SOLUTION INTRAMUSCULAR; INTRAVENOUS at 02:23

## 2023-01-01 RX ADMIN — FUROSEMIDE 20 MG: 10 INJECTION, SOLUTION INTRAMUSCULAR; INTRAVENOUS at 09:09

## 2023-01-01 RX ADMIN — METOPROLOL SUCCINATE 25 MG: 25 TABLET, FILM COATED, EXTENDED RELEASE ORAL at 09:12

## 2023-01-01 RX ADMIN — VANCOMYCIN HYDROCHLORIDE 1000 MG: 10 INJECTION, POWDER, LYOPHILIZED, FOR SOLUTION INTRAVENOUS at 02:23

## 2023-01-01 RX ADMIN — FUROSEMIDE 40 MG: 10 INJECTION, SOLUTION INTRAVENOUS at 00:58

## 2023-01-01 RX ADMIN — Medication 10 ML: at 23:05

## 2023-01-01 RX ADMIN — FAMOTIDINE 20 MG: 20 TABLET ORAL at 10:22

## 2023-01-01 RX ADMIN — Medication 10 ML: at 10:23

## 2023-01-01 RX ADMIN — IPRATROPIUM BROMIDE AND ALBUTEROL SULFATE 3 ML: 2.5; .5 SOLUTION RESPIRATORY (INHALATION) at 22:15

## 2023-01-01 RX ADMIN — SODIUM BICARBONATE 75 MEQ: 84 INJECTION, SOLUTION INTRAVENOUS at 00:58

## 2023-01-17 NOTE — PROGRESS NOTES
Pulmonary Consultation    No ref. provider found,    Thank you for asking me to see Dana Ruiz for   Chief Complaint   Patient presents with   • Restrictive lung disease   • Chest CT results   .      History of Present Illness  Dana Ruiz is a 77 y.o. female     1/17/22  Patient here to follow up after CT scan. Symptoms are unchanged. No new issues since last visit      12/7/22  Patient tells me she is here because she is not moving air very well, and she gets very winded doing things around the house. She feels like she can't take care of herself anymore  She tells me this started several years ago, she started having cough and allergy symptoms.    She has tried some inhalers in the past. Albuterol was not effective. She was given Spiriva but that didn't help. She was given a Breztri sample recently but hasn't started that  She had supplemental O2 in the past after her pneumonia last year but her O2 sats normalized so she returned it    Lives alone-  in a nursing home. 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 Childhood second hand smoke      Review of Systems: History obtained from chart review and the patient.  Review of Systems   HENT: Positive for postnasal drip, rhinorrhea and sore throat.    Respiratory: Positive for cough and shortness of breath.      As described in the HPI. Otherwise, remainder of ROS (14 systems) were negative.    Patient Active Problem List   Diagnosis   • Hypoxia   • Stage 3b chronic kidney disease (HCC)   • Type 2 diabetes mellitus without complication (HCC)   • Allergic rhinitis   • Anemia in chronic kidney disease   • Benign essential hypertension   • Congestive heart failure (HCC)   • Gastro-esophageal reflux disease with esophagitis   • Generalized osteoarthritis   • Hyperlipidemia   • Restrictive lung disease         Current  Outpatient Medications:   •  aspirin 81 MG EC tablet, Take 81 mg by mouth., Disp: , Rfl:   •  Cholecalciferol 50 MCG (2000 UT) tablet, Take 2,000 Units by mouth., Disp: , Rfl:   •  furosemide (LASIX) 40 MG tablet, Take 1 tablet by mouth Daily., Disp: 30 tablet, Rfl: 11  •  lisinopril (PRINIVIL,ZESTRIL) 5 MG tablet, Take 1 tablet by mouth Daily., Disp: 30 tablet, Rfl: 11  •  metFORMIN (GLUCOPHAGE) 500 MG tablet, Take 1 tablet by mouth Daily With Breakfast., Disp: 30 tablet, Rfl: 11  •  metoprolol succinate XL (Toprol XL) 25 MG 24 hr tablet, Take 1 tablet by mouth Daily., Disp: 30 tablet, Rfl: 6  •  Misc. Devices (Rollator Ultra-Light) misc, Use daily as needed, Disp: 1 each, Rfl: 0  •  predniSONE (DELTASONE) 20 MG tablet, Take 1 tablet by mouth Daily., Disp: 60 tablet, Rfl: 0    Allergies   Allergen Reactions   • Polycillin [Ampicillin]        Past Medical History:   Diagnosis Date   • Asthma    • Basal cell carcinoma of left lower eyelid    • Blepharitis     Controlled   • Congestive heart failure (HCC)    • Diabetes mellitus (HCC)     Type 2 diabetes mellitus - no BDR       • Hypercholesterolemia    • Hypertension    • Nuclear senile cataract      Past Surgical History:   Procedure Laterality Date   •  SECTION  1978     Section (2)      • COMBINED HYSTEROSCOPY DIAGNOSTIC / D & C  2005    Hysteroscope procedure (1)      • CYSTOSCOPY  1969    Cystoscopy (1)     • EYELID CARCINOMA EXCISION  2003    Remove eyelid lesion(s) (1)        Social History     Socioeconomic History   • Marital status:    Tobacco Use   • Smoking status: Never   Substance and Sexual Activity   • Alcohol use: No     Family History   Problem Relation Age of Onset   • Cancer Other         other   • Diabetes Other    • Heart disease Other    • Hypertension Other    • Lung cancer Other    • Leukemia Other    • Uterine cancer Other           Objective     Blood pressure 144/82, pulse 105, height 157.5 cm  "(62\"), weight 41.4 kg (91 lb 3.2 oz), SpO2 98 %.  Physical Exam  Vitals reviewed.   Constitutional:       Appearance: Normal appearance. She is underweight.   HENT:      Head: Normocephalic and atraumatic.      Nose: Nose normal.      Mouth/Throat:      Mouth: Mucous membranes are moist.      Pharynx: Oropharynx is clear.   Eyes:      Conjunctiva/sclera: Conjunctivae normal.      Pupils: Pupils are equal, round, and reactive to light.   Cardiovascular:      Rate and Rhythm: Normal rate and regular rhythm.      Pulses: Normal pulses.      Heart sounds: Normal heart sounds.   Pulmonary:      Effort: Pulmonary effort is normal.      Breath sounds: Normal breath sounds.      Comments: Coughing during exam  Abdominal:      General: Abdomen is flat. Bowel sounds are normal.      Palpations: Abdomen is soft.   Musculoskeletal:         General: Normal range of motion.      Left hand: Deformity present.      Cervical back: Normal range of motion.      Comments: Contracture of the 4th and 5th fingers on the left   Skin:     General: Skin is warm and dry.   Neurological:      General: No focal deficit present.      Mental Status: She is alert and oriented to person, place, and time.   Psychiatric:         Mood and Affect: Mood normal.         Behavior: Behavior normal.         PFTs:  (independently reviewed and interpreted by me)  12/7/22  FVC 1.35L, 55%  FEV1 1.18L, 63%  Ratio 88%  TLC 2.71L, 56%  DLCO 27%    Desat study 12/7/22  Spo2 at rest- 98%  SpO2 w/ ambulation RA- 85%  Spo2 w/ ambulation 2L -94%    Radiology (independently reviewed and interpreted by me):   CXR 12/21/21- hyperinflation, lower lobe interstitial changes, no masses or effusion  CT chest 12/20/22- scattered fibrotic changes, lower lobe predominant. No honeycombing       Assessment & Plan     Diagnoses and all orders for this visit:    1. Pulmonary fibrosis (HCC) (Primary)    Other orders  -     predniSONE (DELTASONE) 20 MG tablet; Take 1 tablet by mouth " Daily.  Dispense: 60 tablet; Refill: 0         Discussion/ Recommendations:   Patient with pulmonary fibrosis, CT is no typical for UIP. 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 Will do a trial of steroids. If she has a good clinical response will do a treatment course for a few months and taper. I don't think an aggressive workup for fibrosis is going to be very beneficial for her    -Supplemental O2  -Prednisone 20mg bid until follow up             Return in about 2 weeks (around 1/31/2023) for restrictive lung disease.      Thank you for allowing me to participate in the care of Dana Ruiz. Please do not hesitate to contact me with any questions.         This document has been electronically signed by Asiya Gresham DO on January 17, 2023 10:44 CST

## 2023-02-06 NOTE — PROGRESS NOTES
Orders for a home nebulizer and supplies has been faxed to [facility].  Confirmation received.

## 2023-02-06 NOTE — PROGRESS NOTES
Pulmonary Consultation    No ref. provider found,    Thank you for asking me to see Dana Ruiz for   Chief Complaint   Patient presents with   • Pulmonary fibrosis   .      History of Present Illness  Dana Ruiz is a 77 y.o. female     2/6/23  Patient seen last month for ILD, and started on an empiric course of steroids. She has had some days with no coughing. Her O2 sats have been mostly above 90%. Her breathing is different, but not necessarily better. She feels shaky since taking the prednisone. Of all of her symptoms, the only thing that's really better is the coughing    1/17/23  Patient here to follow up after CT scan. Symptoms are unchanged. No new issues since last visit    12/7/22  Patient tells me she is here because she is not moving air very well, and she gets very winded doing things around the house. She feels like she can't take care of herself anymore  She tells me this started several years ago, she started having cough and allergy symptoms.    She has tried some inhalers in the past. Albuterol was not effective. She was given Spiriva but that didn't help. She was given a Breztri sample recently but hasn't started that  She had supplemental O2 in the past after her pneumonia last year but her O2 sats normalized so she returned it    Lives alone-  in a nursing home. Daughter and brother in town  Born in [place] but lived in [place] here entire life  She works as a medical technologist  No pets      [employer]    Tobacco use history:  Never smoked. Childhood second hand smoke      Review of Systems: History obtained from chart review and the patient.  Review of Systems   HENT: Positive for postnasal drip, rhinorrhea and sore throat.    Respiratory: Positive for cough and shortness of breath.      As described in the HPI. Otherwise, remainder of ROS (14 systems) were negative.    Patient Active Problem List   Diagnosis   • Hypoxia   • Stage 3b chronic kidney  disease (HCC)   • Type 2 diabetes mellitus without complication (HCC)   • Allergic rhinitis   • Anemia in chronic kidney disease   • Benign essential hypertension   • Congestive heart failure (HCC)   • Gastro-esophageal reflux disease with esophagitis   • Generalized osteoarthritis   • Hyperlipidemia   • Restrictive lung disease         Current Outpatient Medications:   •  aspirin 81 MG EC tablet, Take 81 mg by mouth., Disp: , Rfl:   •  Cholecalciferol 50 MCG (2000 UT) tablet, Take 2,000 Units by mouth., Disp: , Rfl:   •  furosemide (LASIX) 40 MG tablet, Take 1 tablet by mouth Daily., Disp: 30 tablet, Rfl: 11  •  lisinopril (PRINIVIL,ZESTRIL) 5 MG tablet, Take 1 tablet by mouth Daily., Disp: 30 tablet, Rfl: 11  •  metFORMIN (GLUCOPHAGE) 500 MG tablet, Take 1 tablet by mouth Daily With Breakfast., Disp: 30 tablet, Rfl: 11  •  metoprolol succinate XL (Toprol XL) 25 MG 24 hr tablet, Take 1 tablet by mouth Daily., Disp: 30 tablet, Rfl: 6  •  Misc. Devices (Rollator Ultra-Light) misc, Use daily as needed, Disp: 1 each, Rfl: 0  •  budesonide (PULMICORT) 0.5 MG/2ML nebulizer solution, Take 2 mL by nebulization 2 (Two) Times a Day., Disp: 60 each, Rfl: 11  •  ipratropium-albuterol (DUO-NEB) 0.5-2.5 mg/3 ml nebulizer, Take 3 mL by nebulization Every 4 (Four) Hours As Needed for Wheezing or Shortness of Air., Disp: 360 mL, Rfl: 5    Allergies   Allergen Reactions   • Polycillin [Ampicillin]        Past Medical History:   Diagnosis Date   • Asthma    • Basal cell carcinoma of left lower eyelid    • Blepharitis     Controlled   • Congestive heart failure (HCC)    • Diabetes mellitus (HCC)     Type 2 diabetes mellitus - no BDR       • Hypercholesterolemia    • Hypertension    • Nuclear senile cataract      Past Surgical History:   Procedure Laterality Date   •  SECTION  1978     Section (2)      • COMBINED HYSTEROSCOPY DIAGNOSTIC / D & C  2005    Hysteroscope procedure (1)      • CYSTOSCOPY   "07/31/1969    Cystoscopy (1)     • EYELID CARCINOMA EXCISION  09/16/2003    Remove eyelid lesion(s) (1)        Social History     Socioeconomic History   • Marital status:    Tobacco Use   • Smoking status: Never   Substance and Sexual Activity   • Alcohol use: No     Family History   Problem Relation Age of Onset   • Cancer Other         other   • Diabetes Other    • Heart disease Other    • Hypertension Other    • Lung cancer Other    • Leukemia Other    • Uterine cancer Other           Objective     Blood pressure 142/78, pulse 111, height 157.5 cm (62\"), weight 41.3 kg (91 lb), SpO2 99 %.  Physical Exam  Vitals reviewed.   Constitutional:       Appearance: Normal appearance. She is underweight.   HENT:      Head: Normocephalic and atraumatic.      Nose: Nose normal.      Mouth/Throat:      Mouth: Mucous membranes are moist.      Pharynx: Oropharynx is clear.   Eyes:      Conjunctiva/sclera: Conjunctivae normal.      Pupils: Pupils are equal, round, and reactive to light.   Cardiovascular:      Rate and Rhythm: Normal rate and regular rhythm.      Pulses: Normal pulses.      Heart sounds: Normal heart sounds.   Pulmonary:      Effort: Pulmonary effort is normal.      Breath sounds: Normal breath sounds.      Comments: Coughing during exam  Abdominal:      General: Abdomen is flat. Bowel sounds are normal.      Palpations: Abdomen is soft.   Musculoskeletal:         General: Normal range of motion.      Left hand: Deformity present.      Cervical back: Normal range of motion.      Comments: Contracture of the 4th and 5th fingers on the left   Skin:     General: Skin is warm and dry.   Neurological:      General: No focal deficit present.      Mental Status: She is alert and oriented to person, place, and time.   Psychiatric:         Mood and Affect: Mood normal.         Behavior: Behavior normal.         PFTs:  (independently reviewed and interpreted by me)  12/7/22  FVC 1.35L, 55%  FEV1 1.18L, 63%  Ratio " 88%  TLC 2.71L, 56%  DLCO 27%    Desat study 12/7/22  Spo2 at rest- 98%  SpO2 w/ ambulation RA- 85%  Spo2 w/ ambulation 2L -94%    Radiology (independently reviewed and interpreted by me):   CXR 12/21/21- hyperinflation, lower lobe interstitial changes, no masses or effusion  CT chest 12/20/22- scattered fibrotic changes, lower lobe predominant. No honeycombing       Assessment & Plan     Diagnoses and all orders for this visit:    1. Restrictive lung disease (Primary)  -     Home Nebulizer  -     Home Nebulizer Accessories    Other orders  -     budesonide (PULMICORT) 0.5 MG/2ML nebulizer solution; Take 2 mL by nebulization 2 (Two) Times a Day.  Dispense: 60 each; Refill: 11  -     ipratropium-albuterol (DUO-NEB) 0.5-2.5 mg/3 ml nebulizer; Take 3 mL by nebulization Every 4 (Four) Hours As Needed for Wheezing or Shortness of Air.  Dispense: 360 mL; Refill: 5         Discussion/ Recommendations:   Patient with fibrosis. Sounds like she had some improvement with coughing, but had a lot of systemic side effects. Will try nebulized steroids. Patient unable to properly use the HFA device, so CANNOT substitute that. Only device she has been succesfully able to use is the respimat and there is no steroid that comes in that device. Can try duonebs prn as well. Will see her back in 4-6 weeks and see how she does with that    -Stop Prednisone  -Start Budesnide bid  -Start duonebs prn             Return in about 6 weeks (around 3/20/2023) for restrictive lung disease.      Thank you for allowing me to participate in the care of Dana Ruiz. Please do not hesitate to contact me with any questions.         This document has been electronically signed by Asiya Gresham DO on February 6, 2023 11:34 CST

## 2023-02-20 NOTE — PROGRESS NOTES
Chief Complaint  Follow-up, Fatigue, and COPD    Subjective          [facility] [place]. Patient states her shortness of breath is getting somewhat better with neb treatments. Patient states at times ( not currently) she gets a rash on her buttocks.   Fatigue  This is a recurrent problem. The current episode started more than 1 month ago. The problem occurs every several days. The problem has been gradually worsening. Associated symptoms include fatigue. The symptoms are aggravated by walking and exertion. She has tried rest for the symptoms. The treatment provided mild relief.   COPD  She complains of difficulty breathing and shortness of breath. Associated symptoms include dyspnea on exertion. Pertinent negatives include no appetite change or trouble swallowing. Her past medical history is significant for COPD.   Shortness of Breath  Her past medical history is significant for COPD.   Breathing Problem  She complains of difficulty breathing and shortness of breath. This is a recurrent problem. The current episode started more than 1 year ago. The problem occurs daily. The problem has been gradually worsening. Associated symptoms include dyspnea on exertion. Pertinent negatives include no appetite change or trouble swallowing. Her symptoms are aggravated by any activity. Her symptoms are alleviated by steroid inhaler. She reports minimal improvement on treatment. Her past medical history is significant for COPD.     Outpatient Medications Prior to Visit   Medication Sig Dispense Refill   • acetaminophen (TYLENOL) 500 MG tablet Take 500 mg by mouth Every 6 (Six) Hours As Needed for Mild Pain.     • aspirin 81 MG EC tablet Take 81 mg by mouth.     • budesonide (PULMICORT) 0.5 MG/2ML nebulizer solution Take 2 mL by nebulization 2 (Two) Times a Day. 60 each 11   • Cholecalciferol 50 MCG (2000 UT) tablet Take 2,000 Units by  "mouth.     • furosemide (LASIX) 40 MG tablet Take 1 tablet by mouth Daily. 30 tablet 11   • ipratropium-albuterol (DUO-NEB) 0.5-2.5 mg/3 ml nebulizer Take 3 mL by nebulization Every 4 (Four) Hours As Needed for Wheezing or Shortness of Air. 360 mL 5   • metFORMIN (GLUCOPHAGE) 500 MG tablet Take 1 tablet by mouth Daily With Breakfast. 30 tablet 11   • metoprolol succinate XL (Toprol XL) 25 MG 24 hr tablet Take 1 tablet by mouth Daily. 30 tablet 6   • Misc. Devices (Rollator Ultra-Light) misc Use daily as needed 1 each 0   • trolamine salicylate (ASPERCREME) 10 % cream Apply 1 application topically to the appropriate area as directed As Needed for Muscle / Joint Pain.     • lisinopril (PRINIVIL,ZESTRIL) 5 MG tablet Take 1 tablet by mouth Daily. 30 tablet 11     No facility-administered medications prior to visit.       Review of Systems   Constitutional: Positive for fatigue. Negative for appetite change.   HENT: Negative for trouble swallowing.    Respiratory: Positive for shortness of breath.    Cardiovascular: Positive for dyspnea on exertion.         Objective   Vital Signs:   Visit Vitals  /58 (BP Location: Left arm, Patient Position: Sitting, Cuff Size: Adult)   Pulse (!) 36   Ht 157.5 cm (62.01\")   Wt 41.6 kg (91 lb 11.2 oz)   SpO2 96%   BMI 16.77 kg/m²     Physical Exam  Vitals and nursing note reviewed.   Constitutional:       Appearance: She is well-developed.   HENT:      Head: Normocephalic and atraumatic.   Eyes:      General: Lids are normal.      Conjunctiva/sclera: Conjunctivae normal.   Neck:      Thyroid: No thyroid mass or thyromegaly.      Trachea: Trachea normal. No tracheal tenderness.   Cardiovascular:      Rate and Rhythm: Normal rate. Rhythm irregular.      Pulses: Normal pulses.      Heart sounds: Normal heart sounds.   Pulmonary:      Effort: Pulmonary effort is normal. No respiratory distress.      Breath sounds: Normal breath sounds. No wheezing.   Abdominal:      General: There is " no distension.      Palpations: Abdomen is soft. There is no mass.   Musculoskeletal:         General: Normal range of motion.      Cervical back: Normal range of motion. No edema.   Skin:     General: Skin is warm and dry.      Coloration: Skin is not pale.      Findings: No abrasion, erythema or lesion.             Comments: Scab    Neurological:      Mental Status: She is alert and oriented to person, place, and time.   Psychiatric:         Mood and Affect: Mood is not anxious. Affect is not inappropriate.         Speech: Speech normal.         Behavior: Behavior normal.         Thought Content: Thought content normal.         Judgment: Judgment normal. Judgment is not impulsive.        Result Review :                 Assessment and Plan    Diagnoses and all orders for this visit:    1. Irregular cardiac rhythm (Primary)  -     ECG 12 Lead  -     Cancel: Comprehensive Metabolic Panel; Future  -     Cancel: CBC & Differential; Future  -     CBC & Differential  -     Comprehensive Metabolic Panel  -     Ambulatory Referral to Cardiology    2. Chronic obstructive pulmonary disease, unspecified COPD type (HCC)  -     Cancel: Comprehensive Metabolic Panel; Future  -     Cancel: CBC & Differential; Future  -     CBC & Differential  -     Comprehensive Metabolic Panel    3. Primary hypertension  -     Cancel: Comprehensive Metabolic Panel; Future  -     Cancel: CBC & Differential; Future  -     CBC & Differential  -     Comprehensive Metabolic Panel    4. Fatigue, unspecified type  -     Cancel: Comprehensive Metabolic Panel; Future  -     Cancel: CBC & Differential; Future  -     CBC & Differential  -     Comprehensive Metabolic Panel    5. PAC (premature atrial contraction)  -     Ambulatory Referral to Cardiology    6. Shortness of breath  -     Ambulatory Referral to Cardiology      ECG today due to irregular heart rate- in office heart rate noted to bounce between   PAC noted on EKG along with possible right  sided enlargement     Continue current medications     Referral to cardiology placed    Complete ordered lab work, I would like to assess potassium levels.           Follow Up   Return in about 4 weeks (around 3/20/2023), or if symptoms worsen or fail to improve, for Next scheduled follow up.  Patient was given instructions and counseling regarding her condition or for health maintenance advice. Please see specific information pulled into the AVS if appropriate.           This document has been electronically signed by LUCIA Bernardo on February 21, 2023 08:26 CST

## 2023-02-22 PROBLEM — I49.3 PVC'S (PREMATURE VENTRICULAR CONTRACTIONS): Status: ACTIVE | Noted: 2023-01-01

## 2023-02-22 NOTE — PROGRESS NOTES
Dana Ruiz  77 y.o. female    02/22/2023  1. Benign essential hypertension    2. Mixed hyperlipidemia    3. PVC's (premature ventricular contractions)    4. Chronic combined systolic and diastolic congestive heart failure (HCC)        History of Present Illness:    77 years old patient with history of congestive heart failure.  Diagnosed in 2000 no follow-up no intervention on risk stratification performed.  Echocardiogram has ejection fractions 2018 40 to 45% with grade 1 diastolic dysfunction and mild mitral and tricuspid regurgitation and exertional dyspnea appears to functional class II-III but well perfused and no evidence of volume overload EKG interventricular conduction delay and isolated premature ventricular complex of right bundle branch block morphology.  No chest pain was reported that she had quite a bit exertional dyspnea with concurrent medical problems hypertension hyperlipidemia diabetes.  She is currently being managed with Lasix lisinopril metoprolol and aspirin.  Her clusters reasonably good range for diabetes.  Reports well-controlled with hemoglobin A1c 10.  No chest pain was reported.  No syncope or orthopnea or PND  February 2023 hemoglobin A1c is 10    September 2022 normal LFT good LDL good total close to  Echo 2018  • Left ventricular systolic function is mildly decreased. Estimated EF appears to be in the range of 41 - 45%. Grade 1A diastolic dysfunction.  • Mild mitral valve regurgitation and tricuspid valve regurgitation. No evidence of pulmonary hypertension.    February 2023  SUBJECTIVE:    Allergies   Allergen Reactions   • Polycillin [Ampicillin]        Past Medical History:   Diagnosis Date   • Asthma    • Basal cell carcinoma of left lower eyelid    • Blepharitis     Controlled   • Congestive heart failure (HCC)    • Diabetes mellitus (HCC)     Type 2 diabetes mellitus - no BDR       • Hypercholesterolemia    • Hypertension    • Nuclear senile cataract        Past  Surgical History:   Procedure Laterality Date   •  SECTION  1978     Section (2)      • COMBINED HYSTEROSCOPY DIAGNOSTIC / D & C  2005    Hysteroscope procedure (1)      • CYSTOSCOPY  1969    Cystoscopy (1)     • EYELID CARCINOMA EXCISION  2003    Remove eyelid lesion(s) (1)          Family History   Problem Relation Age of Onset   • Cancer Other         other   • Diabetes Other    • Heart disease Other    • Hypertension Other    • Lung cancer Other    • Leukemia Other    • Uterine cancer Other        Social History     Socioeconomic History   • Marital status:    Tobacco Use   • Smoking status: Never   Substance and Sexual Activity   • Alcohol use: No       Current Outpatient Medications   Medication Sig Dispense Refill   • acetaminophen (TYLENOL) 500 MG tablet Take 500 mg by mouth Every 6 (Six) Hours As Needed for Mild Pain.     • aspirin 81 MG EC tablet Take 81 mg by mouth.     • budesonide (PULMICORT) 0.5 MG/2ML nebulizer solution Take 2 mL by nebulization 2 (Two) Times a Day. 60 each 11   • Cholecalciferol 50 MCG (2000 UT) tablet Take 2,000 Units by mouth.     • furosemide (LASIX) 40 MG tablet Take 1 tablet by mouth Daily. 30 tablet 11   • ipratropium-albuterol (DUO-NEB) 0.5-2.5 mg/3 ml nebulizer Take 3 mL by nebulization Every 4 (Four) Hours As Needed for Wheezing or Shortness of Air. 360 mL 5   • lisinopril (PRINIVIL,ZESTRIL) 5 MG tablet Take 1 tablet by mouth Daily. 30 tablet 11   • metFORMIN (GLUCOPHAGE) 500 MG tablet Take 1 tablet by mouth Daily With Breakfast. 30 tablet 11   • metoprolol succinate XL (Toprol XL) 25 MG 24 hr tablet Take 1 tablet by mouth Daily. 30 tablet 6   • Misc. Devices (Rollator Ultra-Light) misc Use daily as needed 1 each 0   • trolamine salicylate (ASPERCREME) 10 % cream Apply 1 application topically to the appropriate area as directed As Needed for Muscle / Joint Pain.       No current facility-administered medications for this visit.  "      Review of Systems:   Constitutional:  no change in exercise tolerance.  HENT: Denies any hearing loss, epistaxis  Eyes: No blurred  Respiratory:   Exertional dyspnea functional class II-III  Cardiovascular: See H&P  Gastrointestinal:  Denies change in bowel habits, dyspepsia, ulcer disease  Endocrine: Negative for cold intolerance, heat intolerance, polydipsia, polyphagia  Genitourinary: Negative.  Musculoskeletal: Osteoarthritis  Skin:  Denies rashes or skin lesions.   Allergic/Immunologic: Negative.  Negative for environmental allergies  Neurological:  Denies any history of recurrent headaches   Hematological: No history of easy bruisability  Psychiatric/Behavioral: Denies any history of depression    OBJECTIVE:    /80 (BP Location: Left arm, Patient Position: Sitting, Cuff Size: Adult)   Pulse 98   Ht 157.5 cm (62\")   Wt 41.5 kg (91 lb 9.6 oz)   SpO2 95%   BMI 16.75 kg/m²     Physical Exam:   Constitutional: Cooperative, alert and oriented, well-developed, well-nourished, in no acute distress.   Head: Normocephalic, conjunctive is pink, thyroid is nonpalpable, no jugular is distention  Cardiovascular regular rate/rhythm, no S3, no pericardial rub positive systolic murmur  Pulmonary/Chest: Chest positive bilateral, good air entry, no rales, no wheezing  Abdominal: Abdomen soft, bowel sounds normoactive  Musculoskeletal: No deformities, positive peripheral pulses  Neurological: No gross motor or sensory deficits noted, affect appropriate.   Skin: Warm and dry to the touch, no apparent skin lesions or masses noted.   Psychiatric: Normal mood and affect. Behavior is normal    Procedures    Lab Results   Component Value Date    WBC 10.26 02/20/2023    HGB 13.5 02/20/2023    HCT 42.1 02/20/2023    MCV 88.3 02/20/2023     02/20/2023     Lab Results   Component Value Date    GLUCOSE 264 (H) 02/20/2023    BUN 22 02/20/2023    CREATININE 1.27 (H) 02/20/2023    EGFRIFNONA 36 (L) 12/23/2021    BCR " 17.3 02/20/2023    CO2 28.0 02/20/2023    CALCIUM 9.4 02/20/2023    ALBUMIN 3.6 02/20/2023    AST 48 (H) 02/20/2023    ALT 19 02/20/2023     Lab Results   Component Value Date    CHOL 169 09/28/2022     Lab Results   Component Value Date    TRIG 223 (H) 09/28/2022     Lab Results   Component Value Date    HDL 74 (H) 09/28/2022     No components found for: LDLCALC  Lab Results   Component Value Date    LDL 60 09/28/2022     No results found for: HDLLDLRATIO  No components found for: CHOLHDL  Lab Results   Component Value Date    HGBA1C 10.00 (H) 02/20/2023     Lab Results   Component Value Date    TSH 1.180 09/28/2022           ASSESSMENT AND PLAN:  #1 chronic congestive heart failure due to systolic and diastolic dysfunction exertional dyspnea functional class II-III    Patient have longstanding history of congestive heart failure.  Diagnosed in several years ago last time echocardiogram was 2018 reported ejection fraction 40 to 45% with a diastolic relaxation abnormality.  Patient on Lasix 40 mg a day she is well perfused and does not appear to be volume overloaded.  We will continue diuretics continue lisinopril continue metoprolol.  We will arrange an echocardiogram for further risk stratification and biventricular function assessment.  Exertional dyspnea and diabetes female gender and hypertension seem appropriate to further risk stratify with this Lexiscan Cardiolite procedure risk pros and cons discussed understand willing to proceed forward  She was counseled educated regarding fluid restriction to 1300 cc  Avoid nonsteroid intermittent drug  Daily weight and adjust diuretics according    #2 premature ventricular   isolated premature ventricular complex with underlying intraventricular conduction delay.  She is currently on metoprolol we will continue  We will arrange 72-hour monitor to assess the total burden of PVCs    #3 hypertension    Decent blood pressure continue metoprolol continue lisinopril continue  Lasix     #4 Hyper lipidemia  LDL managed risk factor lifestyle      Diabetes managed by family doctor        I spent 30 minutes caring for Dana on this date of service. This time includes time spent by me of counseling/coordination of care as relates to the presenting problem and any ordered procedures/tests as outlined above.         This document has been electronically signed by Loyd See MD on February 22, 2023 13:20 CST        Diagnoses and all orders for this visit:    1. Benign essential hypertension (Primary)  -     Adult Transthoracic Echo Complete W/ Cont if Necessary Per Protocol; Future  -     Stress Test With Myocardial Perfusion One Day; Future    2. Mixed hyperlipidemia  -     Adult Transthoracic Echo Complete W/ Cont if Necessary Per Protocol; Future  -     Stress Test With Myocardial Perfusion One Day; Future    3. PVC's (premature ventricular contractions)  -     Adult Transthoracic Echo Complete W/ Cont if Necessary Per Protocol; Future  -     Stress Test With Myocardial Perfusion One Day; Future  -     Mobile Cardiac Outpatient Telemetry; Future    4. Chronic combined systolic and diastolic congestive heart failure (HCC)  -     Adult Transthoracic Echo Complete W/ Cont if Necessary Per Protocol; Future  -     Stress Test With Myocardial Perfusion One Day; Future          Loyd See MD  2/22/2023  13:14 CST

## 2023-02-28 PROBLEM — I50.9 CHF EXACERBATION: Status: ACTIVE | Noted: 2023-01-01

## 2023-02-28 PROBLEM — J44.1 COPD EXACERBATION (HCC): Status: ACTIVE | Noted: 2023-01-01

## 2023-03-01 PROBLEM — E43 SEVERE MALNUTRITION (HCC): Status: ACTIVE | Noted: 2023-01-01

## 2023-03-01 NOTE — PROGRESS NOTES
[facility] Medicine   INPATIENT PROGRESS NOTE      Patient Name: Dana Ruiz  Date of Admission: 2/28/2023  Today's Date: 03/01/23  Length of Stay: 0  Primary Care Physician: Madalyn Santiago APRN    Subjective   Chief Complaint:   HPI   overnight course reviewed. She is still somewhat dyspneic but feels better than admission, continued coughing and intermittent chest tightness. Back pain is chronic for her.   Review of Systems   All pertinent negatives and positives are as above. All other systems have been reviewed and are negative unless otherwise stated.     Objective    Temp:  [97.4 °F (36.3 °C)-98.4 °F (36.9 °C)] 97.5 °F (36.4 °C)  Heart Rate:  [60-94] 86  Resp:  [16-24] 18  BP: (121-165)/(58-73) 133/60  Physical Exam  Laying in bed, NAD  Severe malnutrition  Diffuse sacropenia  PERRL, EOMI, wears glasses  O2 via NC  Mucosae moist  Breathing limited  Heart rate controlled. Normotensive  Soft, NT  Moving all limbs  Fragile skin  Results Review:  I have reviewed the labs, radiology results, and diagnostic studies.    Laboratory Data:   Results from last 7 days   Lab Units 02/28/23  1536   WBC 10*3/mm3 8.21   HEMOGLOBIN g/dL 13.5   HEMATOCRIT % 41.6   PLATELETS 10*3/mm3 205        Results from last 7 days   Lab Units 03/01/23  0538 02/28/23  1628   SODIUM mmol/L 141 139   POTASSIUM mmol/L 3.7 3.9   CHLORIDE mmol/L 100 103   CO2 mmol/L 28.0 24.0   BUN mg/dL 21 21   CREATININE mg/dL 1.22* 1.17*   CALCIUM mg/dL 9.3 8.9   BILIRUBIN mg/dL  --  0.4   ALK PHOS U/L  --  167*   ALT (SGPT) U/L  --  61*   AST (SGOT) U/L  --  57*   GLUCOSE mg/dL 149* 260*   Culture Data:   No results found for: BLOODCX  No results found for: URINECX  No results found for: RESPCX  No results found for: WOUNDCX  No results found for: STOOLCX  No components found for: BODYFLD    Radiology Data:   Imaging Results (Last 24 Hours)     Procedure Component Value Units Date/Time    XR Chest PA  & Lateral [[id number]] Collected: 02/28/23 1607     Updated: 02/28/23 1638    Narrative:      Chest x-ray PA and lateral     CLINICAL INDICATION: Shortness of breath    COMPARISON: CT chest December 20, 2022.     FINDINGS: Cardiac silhouette is enlarged in size. Pulmonary  vascularity is unremarkable.     No focal infiltrate or consolidation.  No pleural effusion.  No  pneumothorax.  Extensive interstitial fibrotic changes in both lower lobes,  honeycombing.        Impression:      Cardiomegaly and extensive basilar interstitial  fibrosis.    Similar findings seen on CT chest December 20, 2022.    Electronically signed by:  Meño Hutton MD  2/28/2023 4:36 PM CST  Workstation: [id number]      I have reviewed the patient's current medications.     Assessment/Plan     Active Hospital Problems    Diagnosis    • **CHF exacerbation (HCC) Continue lasix at this time but also on metoprolol with ASA at this time. Needs time to improve   • Severe malnutrition (HCC) Complicates all aspects of care   • Stage 3b chronic kidney disease (HCC) Stable at this time, continue to monitor (range bound despite the mild change in creatinine)   • Anemia in chronic kidney disease Hemostatic for now   • Type 2 diabetes mellitus without complication (HCC) Continue metformin and correctional scale as ordered   • Benign essential hypertension Continue metoprolol   • Gastro-esophageal reflux disease with esophagitis Stable for now   • COPD Stable on O2, budesonide and duoneb while here   Stay today. Would benefit from PT input which was ordered today  Electronically signed by Chris Hunt MD, 03/01/23, 08:59 CST.

## 2023-03-01 NOTE — PLAN OF CARE
Goal Outcome Evaluation:  Plan of Care Reviewed With: caregiver, patient        Progress: no change  Outcome Evaluation: Nutrition:  Pt admitted with CHF Exacerbation and COPD.  She has a BMI of 15.62 and is 77% of her IBW>  She meets ASPEN criteria for severe chronic malnutrition.  She refuses all supplements as well as milk and supplements.  Encoruaged nutrient dense foods.  Will monitor POC and po intake.

## 2023-03-01 NOTE — PLAN OF CARE
Goal Outcome Evaluation: Patient resting in bed throughout shift. Signed bed alarm refusal today. VSS. No distress noted at this time.

## 2023-03-01 NOTE — CONSULTS
Adult Nutrition  Assessment/PES    Patient Name:  Dana Ruiz  YOB: 1945  MRN: [id number]  Admit Date:  2/28/2023    Assessment Date:  3/1/2023    Comments:  Nutrition Assessment:      Pt admitted with CHF exacerbation--on Lasix and CHF.  She has a good appetite and reports that she eats well at home.  However, she does report a wt loss of ~20# since October of ? Etiology.  Her BMI is 15.62 and she is 77% of her IBW.  However, wt hx reveals wt in Nov and Dec 2022 was 94# and CBW is 88#.  This is a 6.4% wt loss.      She meets ASPEN criteria for Severe chronic malnutrition based on her NFPE revealing significant  fat loss and muscle wasting present, see MSA for details.  Suspect that her malnutrition is in part due to her increased energy expenditure with COPD.      Pt adamantly refuses supplements including milk and snacks.  I did remind her that snacks are available in the pantry prn.  Encouraged nutrient dense foods and beverages.      Pt follows a Low Sodium diet at home and has no questions regarding a low sodium diet.  RD will provide written diet copies and contact number for home use.      Will continue to monitor POC and po intake.  Will encourage po.       Reason for Assessment     Row Name 03/01/23 3881          Reason for Assessment    Reason For Assessment per organizational policy     Diagnosis cardiac disease     Identified At Risk by Screening Criteria need for education;BMI                Nutrition/Diet History     Row Name 03/01/23 0433          Nutrition/Diet History    Typical Intake (Food/Fluid/EN/PN) Pt finished with lunch.  She said that she ate pretty good.  Denies any Gi distress.  No eating difficulties.  She said that she has lost and gained wt.  Gained wt due to fluid.  She has lost ~20# since Oct she thinks.  Unsure why because she eats.  She does not like milk and adamently refuses Supplements including Magic cups.  She said that she was eating enough.            "     Labs/Tests/Procedures/Meds     Row Name 03/01/23 1315          Labs/Procedures/Meds    Lab Results Reviewed reviewed, pertinent     Lab Results Comments Gluc 104; Mg 1.4; ALT 95; Alb 3.1        Diagnostic Tests/Procedures    Diagnostic Test/Procedure Reviewed reviewed, pertinent     Diagnostic Test/Procedures Comments Lasix; supplemental 02        Medications    Pertinent Medications Reviewed reviewed, pertinent     Pertinent Medications Comments Lasix; Heparin; Nacl 100cc/hr                Physical Findings     Row Name 03/01/23 1318          Physical Findings    Overall Physical Appearance Per NFPE pt with fat loss and muscle wasting present     Exam Agreement consented                Estimated/Assessed Needs - Anthropometrics     Row Name 03/01/23 1320          Anthropometrics    Height for Calculation 1.6 m (5' 3\")     Weight for Calculation 52.2 kg (115 lb)        Estimated/Assessed Needs    Additional Documentation Additional Requirements (Group);Fluid Requirements (Group);Modified [place] Equation (Group);Estimated Calorie Needs (Group);KCAL/KG (Group);[place]-St. Jeor Equation (Group);Protein Requirements (Group)        Estimated Calorie Needs    Estimated Calorie Need Method [place]-St Jeor        S[place]-St. Jeor Equation    RMR (Somervell-St. Jeor Equation) 970.765     S[place]ell-St. Jeor Activity Factors 1.4 - 1.5     Activity Factors (S[place]ell-St. Jeor) 1359.071 - 1456.1475        Protein Requirements    Weight Used For Protein Calculations 52.2 kg (115 lb)     Est Protein Requirement Amount (gms/kg) 1.3 gm protein     Estimated Protein Requirements (gms/day) 67.81        Fluid Requirements    Fluid Requirements (mL/day) 1500     Estimated Fluid Requirement Method other (see comments)  1500cc fluid restrictions     RDA Method (mL) 1500                Nutrition Prescription Ordered     Row Name 03/01/23 1322          Nutrition Prescription PO    Current PO Diet Regular     Common Modifiers " Cardiac;Consistent Carbohydrate                Evaluation of Received Nutrient/Fluid Intake     Row Name 03/01/23 1322          PO Evaluation    Number of Days PO Intake Evaluated Insufficient Data     Number of Meals 1     % PO Intake 50%                Malnutrition Severity Assessment     Row Name 03/01/23 1324          Malnutrition Severity Assessment    Malnutrition Type Chronic Disease - Related Malnutrition        Unintentional Weight Loss     Unintentional Weight Loss  --   Wt loss of 6.4% since December 2022        Muscle Loss    Loss of Muscle Mass Findings Severe     [place] Region Severe - deep hollowing/scooping, lack of muscle to touch, facial bones well defined     Clavicle Bone Region Severe - protruding prominent bone     Acromion Bone Region Severe - squared shoulders, bones, and acromion process protrusion prominent     Scapular Bone Region Severe - prominent bones, depressions easily visible between ribs, scapula, spine, shoulders     Dorsal Hand Region Severe - prominent depression     Patellar Region Severe - prominent bone, square looking, very little muscle definition     Anterior Thigh Region Severe - line/depression along thigh, obviously thin     Posterior Calf Region Severe - thin with very little definition/firmness        Fat Loss    Subcutaneous Fat Loss Findings Severe     Orbital Region  Severe - pronounced hollowness/depression, dark circles, loose saggy skin     Upper Arm Region Severe - mostly skin, very little space between folds, fingers touch     Thoracic & Lumbar Region Severe - ribs visible with prominent depressions, iliac crest very prominent        Criteria Met (Must meet criteria for severity in at least 2 of these categories: M Wasting, Fat Loss, Fluid, Secondary Signs, Wt. Status, Intake)    Patient meets criteria for  Severe Malnutrition                 Problem/Interventions:   Problem 1     Row Name 03/01/23 1327          Nutrition Diagnoses Problem 1    Problem 1  Malnutrition   Chronic     Etiology (related to) Other (comment);Medical Diagnosis     Pulmonary/Critical Care COPD;Asthma     Other Inadequate Nutrient intake and/or increased energy expenditure     Signs/Symptoms (evidenced by) BMI;% IBW;Unintended Weight Change     BMI Less than 16  15.62     Percent (%) IBW 77 %     Unintended Weight Change Loss     Number of Pounds Lost November and December 2022 94# and CBW 88# Bed scale wt--6.4%                Problem 2     Row Name 03/01/23 1339          Nutrition Diagnoses Problem 2    Problem 2 Increased Nutrient Needs     Macronutrient Protein;Kcal     Micronutrient Vitamin;Mineral     Etiology (related to) Medical Diagnosis     Pulmonary/Critical Care COPD;Asthma     Signs/Symptoms (evidenced by) BMI;% IBW     BMI Less than 16  15.62     Percent (%) IBW 77 %     Other Comment Hypermetabolic State                Problem 3     Row Name 03/01/23 1340          Nutrition Diagnoses Problem 3    Problem 3 Knowledge Deficit     Etiology (related to) Medical Diagnosis     Cardiac CHF     Signs/Symptoms (evidenced by) Potential Information Deficit                  Intervention Goal     Row Name 03/01/23 1340          Intervention Goal    General Reduce/improve symptoms;Meet nutritional needs for age/condition     PO Tolerate PO;Increase intake;Meet estimated needs     Weight Maintain weight  GOAL is for wt gain after current illness has resolved                Nutrition Intervention     Row Name 03/01/23 1342          Nutrition Intervention    RD/Tech Action Follow Tx progress;Care plan reviewd;Encourage intake;Advise alternate selection;Advise available snack;Interview for preference;Menu provided                  Education/Evaluation     Row Name 03/01/23 1342          Education    Education Education topics;Advised regarding habits/behavior     Education Topics Basic nutrition;Protein     Advised Regarding Habits/Behavior Increased nutrient density;Snacks;Food choices;Food  prep;Seasoning food        Monitor/Evaluation    Monitor Per protocol;I&O;PO intake;Pertinent labs;Weight;Skin status;Symptoms     Education Follow-up Reinforce PRN                 Electronically signed by:  Keely Whitten RD  03/01/23 13:45 CST

## 2023-03-01 NOTE — ED NOTES
Nursing report ED to floor  Dana Ruiz  77 y.o.  female    HPI:   Chief Complaint   Patient presents with    Shortness of Breath    Chest Pain       Admitting doctor:   Alex Mcintosh MD    Consulting provider(s):  Consults       No orders found from 1/30/2023 to 3/1/2023.             Admitting diagnosis:   The encounter diagnosis was COPD exacerbation (HCC).    Code status:   Current Code Status       Date Active Code Status Order ID Comments User Context       2/28/2023 1850 No CPR (Do Not Attempt to Resuscitate) [id number]  Harini Chu PA-C ED        Question Answer    Code Status (Patient has no pulse and is not breathing) No CPR (Do Not Attempt to Resuscitate)    Medical Interventions (Patient has pulse or is breathing) Limited Support    Medical Intervention Limits: NO intubation (DNI)    Level Of Support Discussed With Patient                    Allergies:   Polycillin [ampicillin]    Intake and Output  No intake or output data in the 24 hours ending 02/28/23 1854    Weight:       02/28/23  1501   Weight: 41.3 kg (91 lb)       Most recent vitals:   Vitals:    02/28/23 1639 02/28/23 1710 02/28/23 1713 02/28/23 1810   BP: 125/58 132/63 132/63 148/72   BP Location: Right arm Right arm     Patient Position: Sitting Sitting     Pulse: 94 77 77 86   Resp: 16 24  20   Temp:       TempSrc:       SpO2: (!) 88% 94%  95%   Weight:       Height:         Oxygen Therapy: 3L nasal cannula    Active LDAs/IV Access:   Lines, Drains & Airways       Active LDAs       Name Placement date Placement time Site Days    Peripheral IV 12/22/21 1500 Anterior; Distal; Left Forearm 12/22/21  1500  Forearm  433    Peripheral IV 02/28/23 1536 Anterior;Left;Upper Arm 02/28/23  1536  Arm  less than 1                    Labs (abnormal labs have a star):   Labs Reviewed   COMPREHENSIVE METABOLIC PANEL - Abnormal; Notable for the following components:       Result Value    Glucose 260 (*)     Creatinine 1.17 (*)     Total  "Protein 5.9 (*)     Albumin 3.4 (*)     ALT (SGPT) 61 (*)     AST (SGOT) 57 (*)     Alkaline Phosphatase 167 (*)     eGFR 47.9 (*)     All other components within normal limits    Narrative:     GFR Normal >60  Chronic Kidney Disease <60  Kidney Failure <15    The GFR formula is only valid for adults with stable renal function between ages 18 and 70.   BNP (IN-HOUSE) - Abnormal; Notable for the following components:    proBNP 8,188.0 (*)     All other components within normal limits    Narrative:     Among patients with dyspnea, NT-proBNP is highly sensitive for the detection of acute congestive heart failure. In addition NT-proBNP of <300 pg/ml effectively rules out acute congestive heart failure with 99% negative predictive value.    Results may be falsely decreased if patient taking Biotin.     D-DIMER, QUANTITATIVE - Abnormal; Notable for the following components:    D-Dimer, Quantitative 800 (*)     All other components within normal limits    Narrative:     According to the assay 's published package insert, a normal (<500 ng/mL (FEU)) D-dimer result in conjunction with a non-high clinical probability assessment, excludes deep vein thrombosis (DVT) and pulmonary embolism (PE) with high sensitivity.    D-dimer values increase with age and this can make VTE exclusion of an older population difficult. To address this, the American College [employer], based on best available evidence and recent guidelines, recommends that clinicians use age-adjusted D-dimer thresholds in patients greater than 50 years of age with: a) a low probability of PE who do not meet all Pulmonary Embolism Rule Out Criteria, or b) in those with intermediate probability of PE.   The formula for an age-adjusted D-dimer cut-off is \"age*10\".  For example, a 60 year old patient would have an age-adjusted cut-off of 600 ng/mL (FEU) and an 80 year old 800 ng/mL (FEU).     TROPONIN - Abnormal; Notable for the following components:    " HS Troponin T 33 (*)     All other components within normal limits    Narrative:     High Sensitive Troponin T Reference Range:  <10.0 ng/L- Negative Female for AMI  <15.0 ng/L- Negative Male for AMI  >=10 - Abnormal Female indicating possible myocardial injury.  >=15 - Abnormal Male indicating possible myocardial injury.   Clinicians would have to utilize clinical acumen, EKG, Troponin, and serial changes to determine if it is an Acute Myocardial Infarction or myocardial injury due to an underlying chronic condition.        CBC WITH AUTO DIFFERENTIAL - Abnormal; Notable for the following components:    RDW 15.6 (*)     Lymphocyte % 17.5 (*)     Monocyte % 4.8 (*)     Immature Grans % 0.6 (*)     All other components within normal limits   HIGH SENSITIVITIY TROPONIN T 2HR - Abnormal; Notable for the following components:    HS Troponin T 32 (*)     All other components within normal limits    Narrative:     High Sensitive Troponin T Reference Range:  <10.0 ng/L- Negative Female for AMI  <15.0 ng/L- Negative Male for AMI  >=10 - Abnormal Female indicating possible myocardial injury.  >=15 - Abnormal Male indicating possible myocardial injury.   Clinicians would have to utilize clinical acumen, EKG, Troponin, and serial changes to determine if it is an Acute Myocardial Infarction or myocardial injury due to an underlying chronic condition.        URINALYSIS W/ CULTURE IF INDICATED - Abnormal; Notable for the following components:    Protein, UA Trace (*)     All other components within normal limits    Narrative:     In absence of clinical symptoms, the presence of pyuria, bacteria, and/or nitrites on the urinalysis result does not correlate with infection.  Urine microscopic not indicated.   CK - Normal   MAGNESIUM - Normal   SCAN SLIDE - Normal   CBC AND DIFFERENTIAL    Narrative:     The following orders were created for panel order CBC & Differential.  Procedure                               Abnormality          Status                     ---------                               -----------         ------                     CBC Auto Differential[[id number]]        Abnormal            Final result               Scan Slide[[id number]]                   Normal              Final result                 Please view results for these tests on the individual orders.       Meds given in ED:   Medications   furosemide (LASIX) injection 60 mg (60 mg Intravenous Given 2/28/23 4725)     No current facility-administered medications for this encounter.       NIH Stroke Scale:       Isolation/Infection(s):  No active isolations   No active infections     COVID Testing  Collected n/a  Resulted n/a    Nursing report ED to floor:  Mental status: alert and orientedx4  Ambulatory status: assistx1  Precautions: n/a    ED nurse phone [phone]- [phone]

## 2023-03-01 NOTE — PLAN OF CARE
Goal Outcome Evaluation:  Plan of Care Reviewed With: patient           Outcome Evaluation: Pt admitted to the floor this shift. Pt is having adequate urinary output. Appears to be resting well between care.

## 2023-03-01 NOTE — H&P
"    [facility] Medicine  HISTORY AND PHYSICAL      Date of Admission: 2/28/2023  Primary Care Physician: Madalyn Santiago APRN    Subjective     Chief Complaint: Shortness of breath, Swelling    History of Present Illness  Patient is a pleasant 77 year old female (PMHx CHF, COPD, DM, Asthma, HTN) who presented to [facility] ED today with complaints of worsening dyspnea and swelling. She is accompanied by family. She tells me for the last week, she has been noticing that she is not waking in the night as often to urinate. She noticed that her legs were starting to swell and she was gaining weight per her home bathroom scale. In addition, she has felt more short of breath and easily fatigued. She reports it is affecting her quality of life and ability to perform ADLs. She does have COPD and recently saw the pulmonologist. She reports that she has home oxygen, but has not been able to use as directed because of physical inability to hold/carry the 5 pound tanks. She asked the company for something more lightweight but as of yet, no answer has come. Also, she was recently prescribed a nebulizer machine and medications, but the machine was delivered to her porch when she was out and no instructions were given to her on how to use it. She is on daily Lasix therapy, the same dose \"for several years\", and also watches her fluid intake and restricts salt. She just saw cardiology for establishment and is scheduled for outpatient stress test tomorrow.     ED findings include notable oxygen saturation at 86% on room air upon arrival. She is back in the nineties with supplemental oxygen. BNP is elevated at 8,100. CXR shows known fibrosis; no acute pulmonary infiltrate/effusion noted. CBC and CMP stable. Hospitalist team consulted for observation admission.       Review of Systems   Constitutional: Positive for fatigue and unexpected weight change (gain). Negative for appetite change, " chills, diaphoresis and fever.   HENT: Negative for congestion, ear pain, postnasal drip, rhinorrhea, sinus pressure, sinus pain, sneezing, sore throat and trouble swallowing.    Eyes: Negative for photophobia, pain and discharge.   Respiratory: Positive for shortness of breath. Negative for cough, chest tightness and wheezing.    Cardiovascular: Positive for leg swelling. Negative for chest pain and palpitations.   Gastrointestinal: Negative for abdominal pain, blood in stool, constipation, diarrhea, nausea and vomiting.   Endocrine: Negative for polydipsia, polyphagia and polyuria.   Genitourinary: Positive for decreased urine volume. Negative for difficulty urinating, dysuria, flank pain, frequency, hematuria and urgency.   Musculoskeletal: Negative for arthralgias, back pain, myalgias and neck pain.   Skin: Negative for color change, rash and wound.   Neurological: Negative for dizziness, seizures, syncope, weakness and headaches.   Hematological: Does not bruise/bleed easily.   Psychiatric/Behavioral: Negative for behavioral problems, confusion, hallucinations, sleep disturbance and suicidal ideas.        Otherwise complete ROS reviewed and negative except as mentioned in the HPI.    Past Medical History:   Past Medical History:   Diagnosis Date   • Asthma    • Basal cell carcinoma of left lower eyelid    • Blepharitis     Controlled   • Congestive heart failure (HCC)    • Diabetes mellitus (HCC)     Type 2 diabetes mellitus - no BDR       • Hypercholesterolemia    • Hypertension    • Nuclear senile cataract      Past Surgical History:  Past Surgical History:   Procedure Laterality Date   •  SECTION  1978     Section (2)      • COMBINED HYSTEROSCOPY DIAGNOSTIC / D & C  2005    Hysteroscope procedure (1)      • CYSTOSCOPY  1969    Cystoscopy (1)     • EYELID CARCINOMA EXCISION  2003    Remove eyelid lesion(s) (1)        Social History:  reports that she has never smoked.  She does not have any smokeless tobacco history on file. She reports that she does not drink alcohol.    Family History: family history includes Cancer in an other family member; Diabetes in an other family member; Heart disease in an other family member; Hypertension in an other family member; Leukemia in an other family member; Lung cancer in an other family member; Uterine cancer in an other family member.       Allergies:  Allergies   Allergen Reactions   • Polycillin [Ampicillin]        Medications:  Prior to Admission medications    Medication Sig Start Date End Date Taking? Authorizing Provider   acetaminophen (TYLENOL) 500 MG tablet Take 1 tablet by mouth Every 6 (Six) Hours As Needed for Mild Pain.   Yes Ania Doherty MD   aspirin 81 MG EC tablet Take 1 tablet by mouth.   Yes Emergency, Nurse Slim RN   Cholecalciferol 50 MCG (2000 UT) tablet Take 1 tablet by mouth.   Yes Emergency, Nurse Epic, RN   furosemide (LASIX) 40 MG tablet Take 1 tablet by mouth Daily. 9/28/22  Yes Madalyn Santiago APRN   metFORMIN (GLUCOPHAGE) 500 MG tablet Take 1 tablet by mouth Daily With Breakfast. 9/28/22  Yes Madalyn Santiago APRN   metoprolol succinate XL (Toprol XL) 25 MG 24 hr tablet Take 1 tablet by mouth Daily. 10/31/22  Yes Madalyn Santiago APRN   trolamine salicylate (ASPERCREME) 10 % cream Apply 1 application topically to the appropriate area as directed As Needed for Muscle / Joint Pain.   Yes Ania Doherty MD   budesonide (PULMICORT) 0.5 MG/2ML nebulizer solution Take 2 mL by nebulization 2 (Two) Times a Day. 2/6/23   Asiya Gresham DO   empagliflozin (Jardiance) 10 MG tablet tablet Take 1 tablet by mouth Daily. 2/22/23   Madalyn Santiago APRN   ipratropium-albuterol (DUO-NEB) 0.5-2.5 mg/3 ml nebulizer Take 3 mL by nebulization Every 4 (Four) Hours As Needed for Wheezing or Shortness of Air. 2/6/23   Asiya Gresham DO   lisinopril (PRINIVIL,ZESTRIL) 5 MG tablet Take 1 tablet by mouth Daily. 9/28/22    "Madalyn Santiago APRN   Misc. Devices (Rollator Ultra-Light) misc Use daily as needed 1/12/22   Madalyn Santiago APRN     I have utilized all available immediate resources to obtain, update, and review the patient's current medications.    Objective     Vital Signs: /72   Pulse 86   Temp 97.4 °F (36.3 °C) (Oral)   Resp 20   Ht 160 cm (63\")   Wt 41.3 kg (91 lb)   SpO2 95%   BMI 16.12 kg/m²   Physical Exam  Vitals and nursing note reviewed.   Constitutional:       Appearance: Normal appearance. She is underweight.      Interventions: Nasal cannula in place.   HENT:      Head: Normocephalic and atraumatic.      Right Ear: External ear normal.      Left Ear: External ear normal.      Nose: Nose normal. No congestion or rhinorrhea.      Mouth/Throat:      Mouth: Mucous membranes are moist.      Pharynx: Oropharynx is clear.   Eyes:      General: No scleral icterus.     Extraocular Movements: Extraocular movements intact.      Conjunctiva/sclera: Conjunctivae normal.      Pupils: Pupils are equal, round, and reactive to light.   Neck:      Vascular: No carotid bruit.   Cardiovascular:      Rate and Rhythm: Normal rate and regular rhythm.      Pulses: Normal pulses.      Heart sounds: Normal heart sounds. No murmur heard.  Pulmonary:      Effort: Pulmonary effort is normal.      Breath sounds: Examination of the right-lower field reveals decreased breath sounds. Examination of the left-lower field reveals decreased breath sounds. Decreased breath sounds present. No wheezing, rhonchi or rales.   Abdominal:      General: Abdomen is flat. Bowel sounds are normal.      Palpations: Abdomen is soft.      Tenderness: There is no abdominal tenderness. There is no guarding.   Musculoskeletal:         General: No swelling or deformity. Normal range of motion.      Cervical back: Normal range of motion and neck supple. No tenderness.      Right lower leg: Edema present.      Left lower leg: Edema present.   Skin:     " General: Skin is warm and dry.      Capillary Refill: Capillary refill takes less than 2 seconds.      Findings: No rash.   Neurological:      General: No focal deficit present.      Mental Status: She is alert and oriented to person, place, and time.      Motor: No weakness.   Psychiatric:         Mood and Affect: Mood normal.         Behavior: Behavior normal.         Thought Content: Thought content normal.         Judgment: Judgment normal.              Results Reviewed:  Lab Results (last 24 hours)     Procedure Component Value Units Date/Time    High Sensitivity Troponin T 2Hr [[id number]] Collected: 02/28/23 1810    Specimen: Blood Updated: 02/28/23 1815    Comprehensive Metabolic Panel [[id number]]  (Abnormal) Collected: 02/28/23 1628    Specimen: Blood Updated: 02/28/23 1654     Glucose 260 mg/dL      BUN 21 mg/dL      Creatinine 1.17 mg/dL      Sodium 139 mmol/L      Potassium 3.9 mmol/L      Chloride 103 mmol/L      CO2 24.0 mmol/L      Calcium 8.9 mg/dL      Total Protein 5.9 g/dL      Albumin 3.4 g/dL      ALT (SGPT) 61 U/L      AST (SGOT) 57 U/L      Alkaline Phosphatase 167 U/L      Total Bilirubin 0.4 mg/dL      Globulin 2.5 gm/dL      A/G Ratio 1.4 g/dL      BUN/Creatinine Ratio 17.9     Anion Gap 12.0 mmol/L      eGFR 47.9 mL/min/1.73     Narrative:      GFR Normal >60  Chronic Kidney Disease <60  Kidney Failure <15    The GFR formula is only valid for adults with stable renal function between ages 18 and 70.    CK [[id number]]  (Normal) Collected: 02/28/23 1628    Specimen: Blood Updated: 02/28/23 1654     Creatine Kinase 38 U/L     Urinalysis With Culture If Indicated - Urine, Clean Catch [[id number]]  (Abnormal) Collected: 02/28/23 1638    Specimen: Urine, Clean Catch Updated: 02/28/23 1645     Color, UA Yellow     Appearance, UA Clear     pH, UA 5.5     Specific Gravity, UA 1.012     Glucose, UA Negative     Ketones, UA Negative     Bilirubin, UA Negative     Blood, UA Negative     Protein, UA  Trace     Leuk Esterase, UA Negative     Nitrite, UA Negative     Urobilinogen, UA 0.2 E.U./dL    Narrative:      In absence of clinical symptoms, the presence of pyuria, bacteria, and/or nitrites on the urinalysis result does not correlate with infection.  Urine microscopic not indicated.    CBC & Differential [[id number]]  (Abnormal) Collected: 02/28/23 1536    Specimen: Blood Updated: 02/28/23 1611    Narrative:      The following orders were created for panel order CBC & Differential.  Procedure                               Abnormality         Status                     ---------                               -----------         ------                     CBC Auto Differential[[id number]]        Abnormal            Final result               Scan Slide[[id number]]                   Normal              Final result                 Please view results for these tests on the individual orders.    Scan Slide [[id number]]  (Normal) Collected: 02/28/23 1536    Specimen: Blood Updated: 02/28/23 1611     RBC Morphology Normal     WBC Morphology Normal     Platelet Morphology Normal    Magnesium [[id number]]  (Normal) Collected: 02/28/23 1536    Specimen: Blood Updated: 02/28/23 1607     Magnesium 1.9 mg/dL     High Sensitivity Troponin T [[id number]]  (Abnormal) Collected: 02/28/23 1536    Specimen: Blood Updated: 02/28/23 1607     HS Troponin T 33 ng/L     Narrative:      High Sensitive Troponin T Reference Range:  <10.0 ng/L- Negative Female for AMI  <15.0 ng/L- Negative Male for AMI  >=10 - Abnormal Female indicating possible myocardial injury.  >=15 - Abnormal Male indicating possible myocardial injury.   Clinicians would have to utilize clinical acumen, EKG, Troponin, and serial changes to determine if it is an Acute Myocardial Infarction or myocardial injury due to an underlying chronic condition.         BNP [[id number]]  (Abnormal) Collected: 02/28/23 1536    Specimen: Blood Updated: 02/28/23 1605     proBNP 8,188.0  "pg/mL     Narrative:      Among patients with dyspnea, NT-proBNP is highly sensitive for the detection of acute congestive heart failure. In addition NT-proBNP of <300 pg/ml effectively rules out acute congestive heart failure with 99% negative predictive value.    Results may be falsely decreased if patient taking Biotin.      D-dimer, Quantitative [[id number]]  (Abnormal) Collected: 02/28/23 1536    Specimen: Blood Updated: 02/28/23 1605     D-Dimer, Quantitative 800 ng/mL (FEU)     Narrative:      According to the assay 's published package insert, a normal (<500 ng/mL (FEU)) D-dimer result in conjunction with a non-high clinical probability assessment, excludes deep vein thrombosis (DVT) and pulmonary embolism (PE) with high sensitivity.    D-dimer values increase with age and this can make VTE exclusion of an older population difficult. To address this, the [employer], based on best available evidence and recent guidelines, recommends that clinicians use age-adjusted D-dimer thresholds in patients greater than 50 years of age with: a) a low probability of PE who do not meet all Pulmonary Embolism Rule Out Criteria, or b) in those with intermediate probability of PE.   The formula for an age-adjusted D-dimer cut-off is \"age*10\".  For example, a 60 year old patient would have an age-adjusted cut-off of 600 ng/mL (FEU) and an 80 year old 800 ng/mL (FEU).      CBC Auto Differential [[id number]]  (Abnormal) Collected: 02/28/23 1536    Specimen: Blood Updated: 02/28/23 1550     WBC 8.21 10*3/mm3      RBC 4.76 10*6/mm3      Hemoglobin 13.5 g/dL      Hematocrit 41.6 %      MCV 87.4 fL      MCH 28.4 pg      MCHC 32.5 g/dL      RDW 15.6 %      RDW-SD 48.5 fl      MPV 10.3 fL      Platelets 205 10*3/mm3      Neutrophil % 74.7 %      Lymphocyte % 17.5 %      Monocyte % 4.8 %      Eosinophil % 1.3 %      Basophil % 1.1 %      Immature Grans % 0.6 %      Neutrophils, Absolute 6.13 10*3/mm3      " Lymphocytes, Absolute 1.44 10*3/mm3      Monocytes, Absolute 0.39 10*3/mm3      Eosinophils, Absolute 0.11 10*3/mm3      Basophils, Absolute 0.09 10*3/mm3      Immature Grans, Absolute 0.05 10*3/mm3      nRBC 0.0 /100 WBC         Imaging Results (Last 24 Hours)     Procedure Component Value Units Date/Time    XR Chest PA & Lateral [[id number]] Collected: 02/28/23 1607     Updated: 02/28/23 1638    Narrative:      Chest x-ray PA and lateral     CLINICAL INDICATION: Shortness of breath    COMPARISON: CT chest December 20, 2022.     FINDINGS: Cardiac silhouette is enlarged in size. Pulmonary  vascularity is unremarkable.     No focal infiltrate or consolidation.  No pleural effusion.  No  pneumothorax.  Extensive interstitial fibrotic changes in both lower lobes,  honeycombing.        Impression:      Cardiomegaly and extensive basilar interstitial  fibrosis.    Similar findings seen on CT chest December 20, 2022.    Electronically signed by:  Meño Hutton MD  2/28/2023 4:36 PM CST  Workstation: [id number]        I have personally reviewed and interpreted the radiology studies and ECG obtained at time of admission.       Assessment / Plan   Treatment Plan:  1. CHF exacerbation (HCC)   -received IV Lasix in ED; has already been urinating more. Wearing oxygen via NC in ED, as well. Feels good when sitting in bed. I do think the patient will likely require additional RX of Lasix for home as a PRN adjunct when she notices increased swelling/weight gain.    -ECHO ordered for this admission    2. COPD   -no acute wheezing; does not appear to be in acute exacerbation at this time   -will continue home nebs as ordered   -put in for CM Consult to help contact [employer] and see what barrier to a light-weight oxygen system may be for this patient, maybe we can help facilitate this process to get her able to use her home oxygen better    3. DM   -continue Metformin   -Consistent carbohydrate diet, Acchecks with sliding scale  "insulin    4. HTN   -continue Metoprolol (home lisinopril is PRN so will hold that for now)    VTE Prophylaxis: SCDs    Medical Decision Making  Number and Complexity of problems: 4, high complexity    Conditions and Status:        Condition is improving.     The patient has current or prior documentation of left ventricular ejection fraction (LVEF) less than or equal to 40%, or moderate or severely depressed left ventricular systolic function. ; The patient was prescribed or already taking an Angiotensin-Converting Enzyme (ACE) Inhibitor or Angiotensin Receptor Blocker (ARB) and The patient was prescribed or already taking a beta-blocker.    [facility] Data  External documents reviewed: The patient's recent past medical charts for this facility as well as outside facilities via the \"Care Everywhere\" application of Epic reviewed.     Discussed with: patient, attending physician     I have utilized all available immediate resources to obtain, update, or review the patient's current medications (including all prescriptions, over-the-counter products, herbals, cannabis/cannabidiol products, and vitamin/mineral/dietary (nutritional) supplements).     Care Planning  Shared decision making: Patient updated on current status and informed of proposed care plan; is in agreement with plan  Code status and discussions: DNR/DNI  I confirmed that the patient's Advance Care Plan is present, code status is documented, or surrogate decision maker is listed in the patient's medical record.       Disposition  Social Determinants of Health that impact treatment or disposition: n/a  I expect the patient to be discharged to home in 2-3 days.     The patient was seen and examined by me on 02/28/23.        Electronically signed by Harini Chu PA-C, 02/28/23, 18:17 CST.              "

## 2023-03-02 NOTE — DISCHARGE SUMMARY
[facility] Medicine Services  DISCHARGE SUMMARY       Date of Admission: 2/28/2023  Date of Discharge:  3/2/2023  Primary Care Physician: Madalyn Santiago APRN    Presenting Problem:  COPD exacerbation (HCC) [J44.1]     Final Discharge Diagnoses:  Active Hospital Problems    Diagnosis    • **CHF exacerbation (HCC)    • Severe malnutrition (HCC)    • Stage 3b chronic kidney disease (HCC)    • Anemia in chronic kidney disease    • Type 2 diabetes mellitus without complication (HCC)    • Benign essential hypertension    • Gastro-esophageal reflux disease with esophagitis    • Hyperlipidemia      HPI: worsening dyspnea, coughing, hypoxia  Consults:   Consults     No orders found from 1/30/2023 to 3/1/2023.      Procedures Performed:        Pertinent Test Results:   Lab Results (most recent)     Procedure Component Value Units Date/Time    POC Glucose Once [[id number]]  (Normal) Collected: 03/02/23 0550    Specimen: Blood Updated: 03/02/23 0749     Glucose 127 mg/dL      Comment: RN NotifiedOperator: [id number] ELLIE Diaz ID: [id number]       Basic Metabolic Panel [[id number]]  (Abnormal) Collected: 03/02/23 0551    Specimen: Blood Updated: 03/02/23 0708     Glucose 145 mg/dL      BUN 28 mg/dL      Creatinine 1.15 mg/dL      Sodium 139 mmol/L      Potassium 3.9 mmol/L      Chloride 99 mmol/L      CO2 30.0 mmol/L      Calcium 9.4 mg/dL      BUN/Creatinine Ratio 24.3     Anion Gap 10.0 mmol/L      eGFR 48.9 mL/min/1.73     Narrative:      GFR Normal >60  Chronic Kidney Disease <60  Kidney Failure <15    The GFR formula is only valid for adults with stable renal function between ages 18 and 70.    Extra Tubes [[id number]] Collected: 03/02/23 0551    Specimen: Blood, Venous Line Updated: 03/02/23 0700    Narrative:      The following orders were created for panel order Extra Tubes.  Procedure                               Abnormality         Status                      ---------                               -----------         ------                     Lavender Top[[id number]]                                     Final result                 Please view results for these tests on the individual orders.    Lavender Top [[id number]] Collected: 03/02/23 0551    Specimen: Blood Updated: 03/02/23 0700     Extra Tube hold for add-on     Comment: Auto resulted       POC Glucose Once [[id number]]  (Abnormal) Collected: 03/02/23 0237    Specimen: Blood Updated: 03/02/23 0249     Glucose 160 mg/dL      Comment: RN NotifiedOperator: [id number] ELLIE Diaz ID: [id number]       Extra Tubes [[id number]] Collected: 03/01/23 0619    Specimen: Blood, Venous Line Updated: 03/01/23 0731    Narrative:      The following orders were created for panel order Extra Tubes.  Procedure                               Abnormality         Status                     ---------                               -----------         ------                     Lavender Top[[id number]]                                     Final result                 Please view results for these tests on the individual orders.    Lavender Top [[id number]] Collected: 03/01/23 0619    Specimen: Blood Updated: 03/01/23 0731     Extra Tube hold for add-on     Comment: Auto resulted       Basic Metabolic Panel [[id number]]  (Abnormal) Collected: 03/01/23 0538    Specimen: Blood Updated: 03/01/23 0647     Glucose 149 mg/dL      BUN 21 mg/dL      Creatinine 1.22 mg/dL      Sodium 141 mmol/L      Potassium 3.7 mmol/L      Chloride 100 mmol/L      CO2 28.0 mmol/L      Calcium 9.3 mg/dL      BUN/Creatinine Ratio 17.2     Anion Gap 13.0 mmol/L      eGFR 45.5 mL/min/1.73     Narrative:      GFR Normal >60  Chronic Kidney Disease <60  Kidney Failure <15    The GFR formula is only valid for adults with stable renal function between ages 18 and 70.    High Sensitivity Troponin T 2Hr [[id number]]  (Abnormal) Collected: 02/28/23 1810    Specimen:  Blood Updated: 02/28/23 1836     HS Troponin T 32 ng/L      Troponin T Delta -1 ng/L     Narrative:      High Sensitive Troponin T Reference Range:  <10.0 ng/L- Negative Female for AMI  <15.0 ng/L- Negative Male for AMI  >=10 - Abnormal Female indicating possible myocardial injury.  >=15 - Abnormal Male indicating possible myocardial injury.   Clinicians would have to utilize clinical acumen, EKG, Troponin, and serial changes to determine if it is an Acute Myocardial Infarction or myocardial injury due to an underlying chronic condition.         Comprehensive Metabolic Panel [[id number]]  (Abnormal) Collected: 02/28/23 1628    Specimen: Blood Updated: 02/28/23 1654     Glucose 260 mg/dL      BUN 21 mg/dL      Creatinine 1.17 mg/dL      Sodium 139 mmol/L      Potassium 3.9 mmol/L      Chloride 103 mmol/L      CO2 24.0 mmol/L      Calcium 8.9 mg/dL      Total Protein 5.9 g/dL      Albumin 3.4 g/dL      ALT (SGPT) 61 U/L      AST (SGOT) 57 U/L      Alkaline Phosphatase 167 U/L      Total Bilirubin 0.4 mg/dL      Globulin 2.5 gm/dL      A/G Ratio 1.4 g/dL      BUN/Creatinine Ratio 17.9     Anion Gap 12.0 mmol/L      eGFR 47.9 mL/min/1.73     Narrative:      GFR Normal >60  Chronic Kidney Disease <60  Kidney Failure <15    The GFR formula is only valid for adults with stable renal function between ages 18 and 70.    CK [[id number]]  (Normal) Collected: 02/28/23 1628    Specimen: Blood Updated: 02/28/23 1654     Creatine Kinase 38 U/L     Urinalysis With Culture If Indicated - Urine, Clean Catch [[id number]]  (Abnormal) Collected: 02/28/23 1638    Specimen: Urine, Clean Catch Updated: 02/28/23 1645     Color, UA Yellow     Appearance, UA Clear     pH, UA 5.5     Specific Gravity, UA 1.012     Glucose, UA Negative     Ketones, UA Negative     Bilirubin, UA Negative     Blood, UA Negative     Protein, UA Trace     Leuk Esterase, UA Negative     Nitrite, UA Negative     Urobilinogen, UA 0.2 E.U./dL    Narrative:      In absence  of clinical symptoms, the presence of pyuria, bacteria, and/or nitrites on the urinalysis result does not correlate with infection.  Urine microscopic not indicated.    CBC & Differential [[id number]]  (Abnormal) Collected: 02/28/23 1536    Specimen: Blood Updated: 02/28/23 1611    Narrative:      The following orders were created for panel order CBC & Differential.  Procedure                               Abnormality         Status                     ---------                               -----------         ------                     CBC Auto Differential[[id number]]        Abnormal            Final result               Scan Slide[[id number]]                   Normal              Final result                 Please view results for these tests on the individual orders.    Scan Slide [[id number]]  (Normal) Collected: 02/28/23 1536    Specimen: Blood Updated: 02/28/23 1611     RBC Morphology Normal     WBC Morphology Normal     Platelet Morphology Normal    Magnesium [[id number]]  (Normal) Collected: 02/28/23 1536    Specimen: Blood Updated: 02/28/23 1607     Magnesium 1.9 mg/dL     High Sensitivity Troponin T [[id number]]  (Abnormal) Collected: 02/28/23 1536    Specimen: Blood Updated: 02/28/23 1607     HS Troponin T 33 ng/L     Narrative:      High Sensitive Troponin T Reference Range:  <10.0 ng/L- Negative Female for AMI  <15.0 ng/L- Negative Male for AMI  >=10 - Abnormal Female indicating possible myocardial injury.  >=15 - Abnormal Male indicating possible myocardial injury.   Clinicians would have to utilize clinical acumen, EKG, Troponin, and serial changes to determine if it is an Acute Myocardial Infarction or myocardial injury due to an underlying chronic condition.         BNP [[id number]]  (Abnormal) Collected: 02/28/23 1536    Specimen: Blood Updated: 02/28/23 1605     proBNP 8,188.0 pg/mL     Narrative:      Among patients with dyspnea, NT-proBNP is highly sensitive for the detection of acute congestive  "heart failure. In addition NT-proBNP of <300 pg/ml effectively rules out acute congestive heart failure with 99% negative predictive value.    Results may be falsely decreased if patient taking Biotin.      D-dimer, Quantitative [[id number]]  (Abnormal) Collected: 02/28/23 1536    Specimen: Blood Updated: 02/28/23 1605     D-Dimer, Quantitative 800 ng/mL (FEU)     Narrative:      According to the assay 's published package insert, a normal (<500 ng/mL (FEU)) D-dimer result in conjunction with a non-high clinical probability assessment, excludes deep vein thrombosis (DVT) and pulmonary embolism (PE) with high sensitivity.    D-dimer values increase with age and this can make VTE exclusion of an older population difficult. To address this, the [employer], based on best available evidence and recent guidelines, recommends that clinicians use age-adjusted D-dimer thresholds in patients greater than 50 years of age with: a) a low probability of PE who do not meet all Pulmonary Embolism Rule Out Criteria, or b) in those with intermediate probability of PE.   The formula for an age-adjusted D-dimer cut-off is \"age*10\".  For example, a 60 year old patient would have an age-adjusted cut-off of 600 ng/mL (FEU) and an 80 year old 800 ng/mL (FEU).      CBC Auto Differential [[id number]]  (Abnormal) Collected: 02/28/23 1536    Specimen: Blood Updated: 02/28/23 1550     WBC 8.21 10*3/mm3      RBC 4.76 10*6/mm3      Hemoglobin 13.5 g/dL      Hematocrit 41.6 %      MCV 87.4 fL      MCH 28.4 pg      MCHC 32.5 g/dL      RDW 15.6 %      RDW-SD 48.5 fl      MPV 10.3 fL      Platelets 205 10*3/mm3      Neutrophil % 74.7 %      Lymphocyte % 17.5 %      Monocyte % 4.8 %      Eosinophil % 1.3 %      Basophil % 1.1 %      Immature Grans % 0.6 %      Neutrophils, Absolute 6.13 10*3/mm3      Lymphocytes, Absolute 1.44 10*3/mm3      Monocytes, Absolute 0.39 10*3/mm3      Eosinophils, Absolute 0.11 10*3/mm3      " "Basophils, Absolute 0.09 10*3/mm3      Immature Grans, Absolute 0.05 10*3/mm3      nRBC 0.0 /100 WBC         Imaging Results (Most Recent)     Procedure Component Value Units Date/Time    XR Chest PA & Lateral [[id number]] Collected: 02/28/23 1607     Updated: 02/28/23 1638    Narrative:      Chest x-ray PA and lateral     CLINICAL INDICATION: Shortness of breath    COMPARISON: CT chest December 20, 2022.     FINDINGS: Cardiac silhouette is enlarged in size. Pulmonary  vascularity is unremarkable.     No focal infiltrate or consolidation.  No pleural effusion.  No  pneumothorax.  Extensive interstitial fibrotic changes in both lower lobes,  honeycombing.        Impression:      Cardiomegaly and extensive basilar interstitial  fibrosis.    Similar findings seen on CT chest December 20, 2022.    Electronically signed by:  Meño Hutton MD  2/28/2023 4:36 PM CST  Workstation: [id number]          Chief Complaint on Day of Discharge: sitting up feeling better today    Hospital Course:  The patient is a 77 y.o. female who presented to [facility] secondary to worsening dyspnea and swelling due to CHF exacerbation for which she needed increased diuresis and supplemental O2 along with repeat Echo; needed time to be medically optimized before she started coming close to her baseline and O2 requirement at which point she was transitioned to PO medicines. Cardiology input appreciated. Therapy worked with her while here. other chronic issues were medically managed. See notes for details.    Condition on Discharge:  stable    Physical Exam on Discharge:  /61 (BP Location: Right arm, Patient Position: Lying)   Pulse 90   Temp 98 °F (36.7 °C) (Tympanic)   Resp 20   Ht 160 cm (63\")   Wt 41 kg (90 lb 6.4 oz)   SpO2 94%   BMI 16.01 kg/m²   Physical Exam  Laying in bed, NAD  Severe malnutrition  PERRL, EOMI, wears glasses  Mucosae moist  Breathing unlabored  Heart rate controlled. Normotensive  Moving all " limbs    Discharge Disposition:  Home or Self Care    Discharge Medications:     Discharge Medications      Continue These Medications      Instructions Start Date   acetaminophen 500 MG tablet  Commonly known as: TYLENOL   500 mg, Oral, Every 6 Hours PRN      aspirin 81 MG EC tablet   81 mg, Oral      budesonide 0.5 MG/2ML nebulizer solution  Commonly known as: PULMICORT   0.5 mg, Nebulization, 2 Times Daily      Cholecalciferol 50 MCG (2000 UT) tablet   2,000 Units, Oral      empagliflozin 10 MG tablet tablet  Commonly known as: Jardiance   10 mg, Oral, Daily      furosemide 40 MG tablet  Commonly known as: LASIX   40 mg, Oral, Daily      ipratropium-albuterol 0.5-2.5 mg/3 ml nebulizer  Commonly known as: DUO-NEB   3 mL, Nebulization, Every 4 Hours PRN      lisinopril 5 MG tablet  Commonly known as: PRINIVIL,ZESTRIL   5 mg, Oral, Daily      metFORMIN 500 MG tablet  Commonly known as: GLUCOPHAGE   500 mg, Oral, Daily With Breakfast      metoprolol succinate XL 25 MG 24 hr tablet  Commonly known as: Toprol XL   25 mg, Oral, Daily      Rollator Ultra-Light misc   Use daily as needed      trolamine salicylate 10 % cream  Commonly known as: ASPERCREME   1 application, Topical, As Needed           Discharge Diet: diabetic cardiac diet    Activity at Discharge: as tolerated    Discharge Care Plan/Instructions: follow up with PCP, cardiology and pulmonology  Follow-up Appointments:   Future Appointments   Date Time Provider Department Center   3/13/2023 10:00 AM Bony Wagoner MD [facility] None   3/27/2023 11:30 AM Asiya Gresham DO [facility]   8/25/2023 10:00 AM Loyd See MD [facility] None     Test Results Pending at Discharge: none    Time: 1054 AM

## 2023-03-02 NOTE — PLAN OF CARE
Goal Outcome Evaluation:  Plan of Care Reviewed With: patient           Outcome Evaluation: OT eval complete, patient alert x4, agreeable for evaluation. Supine<>sit with modfiied independence. Sit to stand, toilet t/f, functional mobility with SBA (no AE used). Patient SOA with funcitonal mobility (bed<>BR) with SPO2 desaturating to 86% on 2 L NC, HR up to 102 bpm. With PLB and 3 mins, patient up to 91% on 2 L NC. LE dressing, toileting with SBA. Up in bed, all needs inr each. Patient with decreased activity tolerance, decreased safety in transfers, decreased activity tolerance/endurance, and decreased safety in ADLs. Cont inpatient OT. Patient would benefit from HH OT, however, she may not be amenable.

## 2023-03-02 NOTE — PLAN OF CARE
Problem: Adult Inpatient Plan of Care  Goal: Plan of Care Review  Outcome: Ongoing, Progressing   Goal Outcome Evaluation:      Patient had NOY this shift, VSS. Will continue to monitor.

## 2023-03-02 NOTE — CONSULTS
CARDIOVASCULAR CONSULTATION   Bony Wagoner M.D., [facility]                Referring Provider: Dr. Hunt  Date and Time of verbal transmission of consultation: 3/2/2023  Consult type: Routine  Reason for Consultation: Shortness of breath, runs of nonsustained VT on outpatient cardiac monitor  Chief complaint shortness of breath and leg swelling    Subjective .     History of present illness:  Dana Ruiz is a 77 y.o. female [facility]  According to the patient, she noted increased swelling in both of her legs.  Her urine output during nighttime had diminished significantly.  She has a known diagnosis of restrictive lung disease and felt that her breathing was a little bit worse than usual as well.  She decided to get herself evaluated in the emergency room.  The patient also reports occasional episodes of central chest heaviness.  She was recently seen by Dr. See in the cardiology clinic.  She has a known history of LV systolic dysfunction with LVEF ranging from 40-50% on prior transthoracic echocardiograms.  Dr. See arranged an outpatient cardiac monitor at the recent clinic visit for evaluation of PVCs.  Based on report received from Dr. See, the patient was found to have a few runs of nonsustained VT on cardiac monitor.  Mild elevation in serum hsTnT levels has been noted during this admission.  Serum proBNP level was elevated at 8188 pg/mL.  CXR on presentation was interpreted to show cardiomegaly and extensive basilar interstitial fibrosis.  I was asked by Dr. See to evaluate her for C/coronary angiography given the findings of elevated serum troponins, LV systolic dysfunction on echocardiography and nonsustained VT on outpatient cardiac monitoring.    ROS  Constitution: Negative for weight gain and weight loss.   HENT: Negative for congestion.    Eyes: Negative for blurred vision.   Cardiovascular: Negative for  claudication, irregular heartbeat, orthopnea, palpitations, paroxysmal nocturnal dyspnea and syncope.   Respiratory: Negative for hemoptysis.    Positive for cough  Endocrine: Negative for polydipsia and polyuria.   Hematologic/Lymphatic: Negative for bleeding problem. Does not bruise/bleed easily.   Skin: Negative for flushing.   Musculoskeletal: Negative for neck pain and stiffness.   Gastrointestinal: Negative for abdominal pain, nausea and vomiting.   Genitourinary: Negative for dysuria and hematuria.   Neurological: Negative for dizziness, focal weakness and numbness.   Psychiatric/Behavioral: Negative for altered mental status and depression.       History  Past Medical History:   Diagnosis Date   • Asthma    • Basal cell carcinoma of left lower eyelid    • Blepharitis     Controlled   • Congestive heart failure (HCC)    • Diabetes mellitus (HCC)     Type 2 diabetes mellitus - no BDR       • Hypercholesterolemia    • Hypertension    • Nuclear senile cataract    ,   Past Surgical History:   Procedure Laterality Date   •  SECTION  1978     Section (2)      • COMBINED HYSTEROSCOPY DIAGNOSTIC / D & C  2005    Hysteroscope procedure (1)      • CYSTOSCOPY  1969    Cystoscopy (1)     • EYELID CARCINOMA EXCISION  2003    Remove eyelid lesion(s) (1)      ,   Family History   Problem Relation Age of Onset   • Cancer Other         other   • Diabetes Other    • Heart disease Other    • Hypertension Other    • Lung cancer Other    • Leukemia Other    • Uterine cancer Other    ,   Social History     Tobacco Use   • Smoking status: Never   Vaping Use   • Vaping Use: Never used   Substance Use Topics   • Alcohol use: No   • Drug use: Never   ,   Medications Prior to Admission   Medication Sig Dispense Refill Last Dose   • acetaminophen (TYLENOL) 500 MG tablet Take 1 tablet by mouth Every 6 (Six) Hours As Needed for Mild Pain.   2023   • aspirin 81 MG EC tablet Take 1 tablet by  mouth.   2/28/2023   • Cholecalciferol 50 MCG (2000 UT) tablet Take 1 tablet by mouth.   2/28/2023   • furosemide (LASIX) 40 MG tablet Take 1 tablet by mouth Daily. 30 tablet 11 2/28/2023   • metFORMIN (GLUCOPHAGE) 500 MG tablet Take 1 tablet by mouth Daily With Breakfast. 30 tablet 11 2/28/2023   • metoprolol succinate XL (Toprol XL) 25 MG 24 hr tablet Take 1 tablet by mouth Daily. 30 tablet 6 2/28/2023   • trolamine salicylate (ASPERCREME) 10 % cream Apply 1 application topically to the appropriate area as directed As Needed for Muscle / Joint Pain.   2/28/2023   • budesonide (PULMICORT) 0.5 MG/2ML nebulizer solution Take 2 mL by nebulization 2 (Two) Times a Day. 60 each 11 Unknown   • empagliflozin (Jardiance) 10 MG tablet tablet Take 1 tablet by mouth Daily. 30 tablet 11 Unknown   • ipratropium-albuterol (DUO-NEB) 0.5-2.5 mg/3 ml nebulizer Take 3 mL by nebulization Every 4 (Four) Hours As Needed for Wheezing or Shortness of Air. 360 mL 5 Unknown   • lisinopril (PRINIVIL,ZESTRIL) 5 MG tablet Take 1 tablet by mouth Daily. 30 tablet 11 Unknown   • Misc. Devices (Rollator Ultra-Light) misc Use daily as needed 1 each 0 Unknown    and Allergies:  Polycillin [ampicillin]    Objective   PHYSICAL EXAM:         Anticoagulants (From admission, onward)    None          Vital Sign Min/Max for last 24 hours  Temp  Min: 96.3 °F (35.7 °C)  Max: 98 °F (36.7 °C)   BP  Min: 104/57  Max: 134/61   Pulse  Min: 67  Max: 118   Resp  Min: 16  Max: 20   SpO2  Min: 93 %  Max: 98 %   Flow (L/min)  Min: 2  Max: 2   Weight  Min: 41 kg (90 lb 6.4 oz)  Max: 41 kg (90 lb 6.4 oz)     [unfilled]  Vitals:    03/02/23 0724 03/02/23 0736 03/02/23 0859 03/02/23 1200   BP: 134/61   128/58   BP Location: Right arm   Left arm   Patient Position: Lying   Lying   Pulse: 82 84 90 88   Resp: 20 20  16   Temp: 98 °F (36.7 °C)   96.3 °F (35.7 °C)   TempSrc: Tympanic   Oral   SpO2: 93% 94%  95%   Weight:       Height:         [unfilled]  SpO2 Percentage     03/02/23 0724 03/02/23 0736 03/02/23 1200   SpO2: 93% 94% 95%     Body mass index is 16.01 kg/m².   Current Pain Level: None.  Pulse Ox: Normal  on nasal cannula oxygen  General: Alert, appears stated age and cooperative  .   Body Habitus: Underweight  HEENT: Head: Normocephalic, no lesions, without obvious abnormality.     Neuro: Alert, oriented x3.  JVP: Volume/Pulsation: Normal.  Normal waveforms.   Appropriate inspiratory decrease.     Carotid Exam: No bruit, normal pulsation bilaterally.      Respirations: No increased work of breathing.   Chest:  Normal.    Pulmonary: No wheezes heard  Heart rate: Normal.    Heart Rhythm: Regular.     Heart Sounds: S1: Normal.  S2: Normal.  S3: Absent.     Abdomen:   Appearance: Normal.  Palpation: Soft, nontender to palpation, bowel sounds positive in all four quadrants  Extremity: Trace bilateral pitting lower extremity edema.       DATA REVIEWED:     EKG. I personally reviewed and interpreted the EKG.  normal sinus rhythm, possible left atrial enlargement, nonspecific IVCD, cannot rule out anterolateral infarct on 2/28/2023    ECG/EMG Results (all)     Procedure Component Value Units Date/Time    ECG 12 Lead Dyspnea [[id number]] Collected: 02/28/23 1508     Updated: 02/28/23 2126     QT Interval 410 ms      QTC Interval 493 ms     Narrative:      Test Reason : SOA  Blood Pressure :   */*   mmHG  Vent. Rate :  87 BPM     Atrial Rate :  87 BPM     P-R Int : 172 ms          QRS Dur : 138 ms      QT Int : 410 ms       P-R-T Axes :  75 264  82 degrees     QTc Int : 493 ms    Normal sinus rhythm  Nonspecific intraventricular block  Possible Lateral infarct , age undetermined  Abnormal ECG  When compared with ECG of 20-FEB-2023 11:25,  Fusion complexes are no longer Present  Aberrant conduction is no longer Present    Referred By: ERDR           Confirmed By: YAMINI DIAZ MD    Adult Transthoracic Echo Complete w/ Color, Spectral and Contrast if Necessary Per Protocol  [[id number]] Resulted: 03/01/23 1108     Updated: 03/01/23 1112     Target HR (85%) 121 bpm      Max. Pred. HR (100%) 142 bpm      EF(MOD-bp) 47.3 %      LVIDd 4.6 cm      LVIDs 3.6 cm      IVSd 0.78 cm      LVPWd 0.96 cm      FS 21.5 %      IVS/LVPW 0.82 cm      ESV(cubed) 47.7 ml      LV Sys Vol (BSA corrected) 17.7 cm2      EDV(cubed) 98.3 ml      LV Beard Vol (BSA corrected) 31.7 cm2      LVOT area 2.7 cm2      LV mass(C)d 132.6 grams      LVOT diam 1.86 cm      EDV(MOD-sp2) 54.2 ml      EDV(MOD-sp4) 43.3 ml      ESV(MOD-sp2) 27.2 ml      ESV(MOD-sp4) 24.2 ml      SV(MOD-sp2) 27.0 ml      SV(MOD-sp4) 19.1 ml      SI(MOD-sp2) 19.8 ml/m2      SI(MOD-sp4) 14.0 ml/m2      EF(MOD-sp2) 49.8 %      EF(MOD-sp4) 44.1 %      MV E max khari 76.0 cm/sec      MV A max khari 125.8 cm/sec      MV E/A 0.60     LA ESV Index (BP) 19.1 ml/m2      Med Peak E' Khari 4.2 cm/sec      Lat Peak E' Khari 6.8 cm/sec      Avg E/e' ratio 13.82     SV(LVOT) 33.8 ml      RVIDd 2.9 cm      TAPSE (>1.6) 1.74 cm      LA dimension (2D)  3.4 cm      LV V1 max 67.0 cm/sec      LV V1 max PG 1.80 mmHg      LV V1 mean PG 0.72 mmHg      LV V1 VTI 12.4 cm      Ao pk khair 103.4 cm/sec      Ao max PG 4.3 mmHg      Ao mean PG 2.50 mmHg      Ao V2 VTI 20.9 cm      VINCE(I,D) 1.62 cm2      MV max PG 8.1 mmHg      MV mean PG 3.0 mmHg      MV V2 VTI 33.5 cm      MV P1/2t 47.0 msec      MVA(P1/2t) 4.7 cm2      MVA(VTI) 1.01 cm2      MV dec slope 437.0 cm/sec2      MR max khari 501.2 cm/sec      MR max .5 mmHg      TR max khari 393.6 cm/sec      TR max PG 62.0 mmHg      RVSP(TR) 70.0 mmHg      RAP systole 8.0 mmHg      PA V2 max 73.8 cm/sec      PI end-d khari 56.5 cm/sec      Ao root diam 2.8 cm      ACS 1.65 cm      STJ 2.27 cm     Narrative:      •  Left ventricular ejection fraction appears to be 46 - 50%.  •  Left ventricular diastolic function is consistent with (grade I)   impaired relaxation.  •  The right atrial cavity is mildly  dilated.  •  Moderate tricuspid  valve regurgitation is present.  •  Estimated right ventricular systolic pressure from tricuspid   regurgitation is markedly elevated (>55 mmHg).  •  Severe pulmonary hypertension is present.      SCANNED EKG [[id number]] Resulted: 02/28/23     Updated: 03/01/23 1354    SCANNED - TELEMETRY   [[id number]] Resulted: 02/28/23     Updated: 03/01/23 1422    SCANNED - TELEMETRY   [[id number]] Resulted: 02/28/23     Updated: 03/01/23 1454    SCANNED - TELEMETRY   [[id number]] Resulted: 02/28/23     Updated: 03/01/23 1458        ---------------------------------------------------  TTE/ELSIE:  Results for orders placed during the hospital encounter of 02/28/23    Adult Transthoracic Echo Complete w/ Color, Spectral and Contrast if Necessary Per Protocol    Interpretation Summary  •  Left ventricular ejection fraction appears to be 46 - 50%.  •  Left ventricular diastolic function is consistent with (grade I) impaired relaxation.  •  The right atrial cavity is mildly  dilated.  •  Moderate tricuspid valve regurgitation is present.  •  Estimated right ventricular systolic pressure from tricuspid regurgitation is markedly elevated (>55 mmHg).  •  Severe pulmonary hypertension is present.    -----------------------------------------------------  CXR/Imaging:     Imaging Results (Most Recent)     Procedure Component Value Units Date/Time    XR Chest PA & Lateral [[id number]] Collected: 02/28/23 1607     Updated: 02/28/23 1638    Narrative:      Chest x-ray PA and lateral     CLINICAL INDICATION: Shortness of breath    COMPARISON: CT chest December 20, 2022.     FINDINGS: Cardiac silhouette is enlarged in size. Pulmonary  vascularity is unremarkable.     No focal infiltrate or consolidation.  No pleural effusion.  No  pneumothorax.  Extensive interstitial fibrotic changes in both lower lobes,  honeycombing.        Impression:      Cardiomegaly and extensive basilar interstitial  fibrosis.    Similar findings seen on CT chest December 20,  2022.    Electronically signed by:  Meño Hutton MD  2/28/2023 4:36 PM CST  Workstation: [id number]        -----------------------------------------------------  CT:   XR Chest PA & Lateral    Result Date: 2/28/2023  Cardiomegaly and extensive basilar interstitial fibrosis. Similar findings seen on CT chest December 20, 2022. Electronically signed by:  Meño Hutton MD  2/28/2023 4:36 PM CST Workstation: [id number]    ----------------------------------------------------  PFTs:     --------------------------------------------------------------------------------------------------  LABS:     The CVD Risk score (Sydnee et al., 2008) failed to calculate for the following reasons:    The 2008 CVD risk score is only valid for ages 30 to 74    The patient has a prior MI, stroke, CHF, or peripheral vascular disease diagnosis  Lab Results   Component Value Date    GLUCOSE 145 (H) 03/02/2023    BUN 28 (H) 03/02/2023    CREATININE 1.15 (H) 03/02/2023    EGFRIFNONA 36 (L) 12/23/2021    BCR 24.3 03/02/2023    K 3.9 03/02/2023    CO2 30.0 (H) 03/02/2023    CALCIUM 9.4 03/02/2023    ALBUMIN 3.4 (L) 02/28/2023    AST 57 (H) 02/28/2023    ALT 61 (H) 02/28/2023       Lab Results   Component Value Date    GLUCOSE 145 (H) 03/02/2023    CALCIUM 9.4 03/02/2023     03/02/2023    K 3.9 03/02/2023    CO2 30.0 (H) 03/02/2023    CL 99 03/02/2023    BUN 28 (H) 03/02/2023    CREATININE 1.15 (H) 03/02/2023    EGFRIFNONA 36 (L) 12/23/2021    BCR 24.3 03/02/2023    ANIONGAP 10.0 03/02/2023       Lab Results   Component Value Date    WBC 8.21 02/28/2023    HGB 13.5 02/28/2023    HCT 41.6 02/28/2023    MCV 87.4 02/28/2023     02/28/2023       Lab Results   Component Value Date    CHOL 169 09/28/2022    TRIG 223 (H) 09/28/2022    HDL 74 (H) 09/28/2022    LDL 60 09/28/2022       Lab Results   Component Value Date    TSH 1.180 09/28/2022       Lab Results   Component Value Date    CKTOTAL 38 02/28/2023    TROPONINT 32 (H) 02/28/2023       Lab  Results   Component Value Date    HGBA1C 10.00 (H) 02/20/2023     No results found for: DDIMER  Lab Results   Component Value Date    ALT 61 (H) 02/28/2023     Lab Results   Component Value Date    HGBA1C 10.00 (H) 02/20/2023    HGBA1C 7.30 (H) 09/28/2022     Lab Results   Component Value Date    MICROALBUR 1.3 09/28/2022    CREATININE 1.15 (H) 03/02/2023     Lab Results   Component Value Date    IRON 98 06/15/2021    TIBC 410 06/15/2021    FERRITIN 153.00 (H) 12/15/2020     No results found for: INR, PROTIME      Assessment & Plan     1.  Cardiomyopathy.  Exact etiology unclear.  LVEF has been estimated at 40-50% on transthoracic echocardiograms.  Her volume status has improved with Lasix therapy during this admission.  2.  Mildly elevated serum high-sensitivity troponin T levels.    3.  Runs of nonsustained wide QRS tachycardia noted on outpatient cardiac monitoring.    Recommendations:  Continue baby aspirin and metoprolol therapy.  Resume home dose Lasix on discharge.  The patient is also on Jardiance therapy as outpatient.  Given the presence of multiple coronary risk factors, elevated serum troponin levels, LV systolic dysfunction noted on echocardiography and runs of nonsustained wide QRS tachycardia noted on outpatient monitoring, she was recommended further evaluation for possible underlying obstructive CAD with [facility]/coronary angiography.  I discussed the risks, benefits and alternatives of this procedure in detail with her and her daughter today.  They would like to hold off on this procedure during this hospitalization and would like to follow-up in the cardiology clinic for further discussion.    Thank you so much for allowing me to participate and consult on Dana Ruiz.      Recommend outpatient follow-up with cardiology within 1-2 weeks after discharge.    Disposition: Floor

## 2023-03-02 NOTE — THERAPY EVALUATION
Patient Name: Dana Ruiz  : 1945    MRN: [id number]                              Today's Date: 3/2/2023       Admit Date: 2023    Visit Dx:     ICD-10-CM ICD-9-CM   1. COPD exacerbation (HCC)  J44.1 491.21   2. Impaired mobility and ADLs  Z74.09 V49.89    Z78.9      Patient Active Problem List   Diagnosis   • Hypoxia   • Stage 3b chronic kidney disease (HCC)   • Type 2 diabetes mellitus without complication (HCC)   • Allergic rhinitis   • Anemia in chronic kidney disease   • Benign essential hypertension   • Congestive heart failure (HCC)   • Gastro-esophageal reflux disease with esophagitis   • Generalized osteoarthritis   • Hyperlipidemia   • Restrictive lung disease   • PVC's (premature ventricular contractions)   • CHF exacerbation (HCC)   • Severe malnutrition (HCC)     Past Medical History:   Diagnosis Date   • Asthma    • Basal cell carcinoma of left lower eyelid    • Blepharitis     Controlled   • Congestive heart failure (HCC)    • Diabetes mellitus (HCC)     Type 2 diabetes mellitus - no BDR       • Hypercholesterolemia    • Hypertension    • Nuclear senile cataract      Past Surgical History:   Procedure Laterality Date   •  SECTION  1978     Section (2)      • COMBINED HYSTEROSCOPY DIAGNOSTIC / D & C  2005    Hysteroscope procedure (1)      • CYSTOSCOPY  1969    Cystoscopy (1)     • EYELID CARCINOMA EXCISION  2003    Remove eyelid lesion(s) (1)         General Information     Row Name 23 1015          OT Time and Intention    Document Type evaluation  -SJ     Mode of Treatment occupational therapy  -     Row Name 23 1015          General Information    Patient Profile Reviewed yes  -SJ     Prior Level of Function independent:;gait;transfer;bed mobility;ADL's;feeding;grooming;dressing;bathing;home management;cooking;driving;shopping  -SJ     Existing Precautions/Restrictions fall;oxygen therapy device and L/min  -SJ     Row Name  03/02/23 1015          Living Environment    People in Home alone  -[facility] Name 03/02/23 1015          Stairs Within Home, Primary    Stairs, Within Home, Primary Has home O2 but has not need to use it lately. Ambulates with 4WW recently. Bedrooms upstairs. Rollator used outside, furniture surfs inside the house. Walk in shower, with shower chair.  -[facility] Name 03/02/23 1015          Cognition    Orientation Status (Cognition) oriented x 4  -[facility] Name 03/02/23 1015          Safety Issues, Functional Mobility    Safety Issues Affecting Function (Mobility) safety precaution awareness;insight into deficits/self-awareness  -     Impairments Affecting Function (Mobility) strength;endurance/activity tolerance;balance  -           User Key  (r) = Recorded By, (t) = Taken By, (c) = Cosigned By    Initials Name Provider Type     Betty Ortega, OT Occupational Therapist                 Mobility/ADL's     Row Name 03/02/23 1015          Bed Mobility    Bed Mobility supine-sit;sit-supine  -     Supine-Sit [place] (Bed Mobility) modified independence  -     Sit-Supine [place] (Bed Mobility) modified independence  -     Assistive Device (Bed Mobility) head of bed elevated;bed rails  -[facility] Name 03/02/23 1015          Transfers    Transfers sit-stand transfer;toilet transfer  -[facility] Name 03/02/23 1015          Toilet Transfer    Type (Toilet Transfer) stand-sit  -     [place] Level (Toilet Transfer) standby assist  -[facility] Name 03/02/23 1015          Activities of Daily Living    BADL Assessment/Intervention lower body dressing;toileting  -[facility] Name 03/02/23 1015          Lower Body Dressing Assessment/Training    [place] Level (Lower Body Dressing) doff;don;socks;standby assist  -     Position (Lower Body Dressing) edge of bed sitting  -[facility] Name 03/02/23 1015          Toileting Assessment/Training    [place] Level (Toileting) standby assist  -            User Key  (r) = Recorded By, (t) = Taken By, (c) = Cosigned By    Initials Name Provider Type     Betty Ortega OT Occupational Therapist               Obj/Interventions     [facility] Name 03/02/23 1015          Sensory Assessment (Somatosensory)    Sensory Assessment (Somatosensory) UE sensation intact  -[facility] Name 03/02/23 1015          Range of Motion Comprehensive    General Range of Motion bilateral lower extremity ROM WFL  -     Comment, General Range of Motion Dupuytren's contractures digits 4&5, able to perform PIP and DIP flexion, 1/2 ROM of extension  -[facility] Name 03/02/23 1015          Strength Comprehensive (MMT)    General Manual Muscle Testing (MMT) Assessment other (see comments)  -     Comment, General Manual Muscle Testing (MMT) Assessment BUE 4-/5 grossly  -           User Key  (r) = Recorded By, (t) = Taken By, (c) = Cosigned By    Initials Name Provider Type     Betty Ortega OT Occupational Therapist               Goals/Plan     [facility] Name 03/02/23 1015          Transfer Goal 1 (OT)    Activity/Assistive Device (Transfer Goal 1, OT) toilet  -     [place] Level/Cues Needed (Transfer Goal 1, OT) independent  -SJ     Time Frame (Transfer Goal 1, OT) long term goal (LTG);by discharge  -SJ     Progress/Outcome (Transfer Goal 1, OT) goal not met  -SJ     Row Name 03/02/23 1015          Bathing Goal 1 (OT)    Activity/Device (Bathing Goal 1, OT) lower body bathing  -     [place] Level/Cues Needed (Bathing Goal 1, OT) modified independence  -SJ     Time Frame (Bathing Goal 1, OT) long term goal (LTG);by discharge  -SJ     Progress/Outcomes (Bathing Goal 1, OT) goal not met  -SJ     Row Name 03/02/23 1015          Strength Goal 1 (OT)    Strength Goal 1 (OT) Patient to improve BUE strength to 4/5 grossly to facilitate ADLs, transfers, and IADLs.  -SJ     Time Frame (Strength Goal 1, OT) long term goal (LTG);by discharge  -SJ     Progress/Outcome (Strength Goal 1,  OT) goal not met  -     Row Name 03/02/23 1015          Problem Specific Goal 1 (OT)    Problem Specific Goal 1 (OT) Patient to verbalize 3 energy conservation techniques with independence.  -     Time Frame (Problem Specific Goal 1, OT) long term goal (LTG);by discharge  -     Progress/Outcome (Problem Specific Goal 1, OT) goal not met  -     Row Name 03/02/23 1015          Therapy Assessment/Plan (OT)    Planned Therapy Interventions (OT) activity tolerance training;adaptive equipment training;BADL retraining;edema control/reduction;cognitive/visual perception retraining;functional balance retraining;IADL retraining;manual therapy/joint mobilization;occupation/activity based interventions;neuromuscular control/coordination retraining;passive ROM/stretching;patient/caregiver education/training;strengthening exercise;transfer/mobility retraining;ROM/therapeutic exercise  -           User Key  (r) = Recorded By, (t) = Taken By, (c) = Cosigned By    Initials Name Provider Type     Betty Ortega, OT Occupational Therapist               Clinical Impression     Row Name 03/02/23 1015          Pain Assessment    Pretreatment Pain Rating 0/10 - no pain  -     Posttreatment Pain Rating 0/10 - no pain  -     Row Name 03/02/23 1015          Plan of Care Review    Plan of Care Reviewed With patient  -     Outcome Evaluation OT eval complete, patient alert x4, agreeable for evaluation. Supine<>sit with modfiied independence. Sit to stand, toilet t/f, functional mobility with SBA (no AE used). Patient SOA with funcitonal mobility (bed<>BR) with SPO2 desaturating to 86% on 2 L NC, HR up to 102 bpm. With PLB and 3 mins, patient up to 91% on 2 L NC. LE dressing, toileting with SBA. Up in bed, all needs inr each. Patient with decreased activity tolerance, decreased safety in transfers, decreased activity tolerance/endurance, and decreased safety in ADLs. Cont inpatient OT. Patient would benefit from  OT,  however, she may not be amenable.  -     Row Name 03/02/23 1015          Therapy Assessment/Plan (OT)    Patient/Family Therapy Goal Statement (OT) return home  -     Rehab Potential (OT) good, to achieve stated therapy goals  -     Criteria for Skilled Therapeutic Interventions Met (OT) yes;skilled treatment is necessary  -     Therapy Frequency (OT) other (see comments)  5-7 d/wk  -     Predicted Duration of Therapy Intervention (OT) until d/c or all goals met  -     Row Name 03/02/23 1015          Therapy Plan Review/Discharge Plan (OT)    Anticipated Discharge Disposition (OT) home with assist;home with home health  -     Row Name 03/02/23 1015          Vital Signs    Pre Systolic BP Rehab 117  -SJ     Pre Treatment Diastolic BP 51  -SJ     Post Systolic BP Rehab 129  -SJ     Post Treatment Diastolic BP 57  -SJ     Pretreatment Heart Rate (beats/min) 96  -SJ     Intratreatment Heart Rate (beats/min) 102  -SJ     Posttreatment Heart Rate (beats/min) 99  -SJ     Pre SpO2 (%) 96  -SJ     O2 Delivery Pre Treatment nasal cannula  2 lpm  -SJ     Intra SpO2 (%) 87   -SJ     O2 Delivery Intra Treatment nasal cannula  -SJ     Post SpO2 (%) 94  -SJ     O2 Delivery Post Treatment nasal cannula  -SJ     Pre Patient Position Supine  -SJ     Intra Patient Position Sitting  -SJ     Post Patient Position Supine  -     Row Name 03/02/23 1015          Positioning and Restraints    Pre-Treatment Position in bed  -SJ     Post Treatment Position bed  -SJ     In Bed notified nsg;supine;call light within reach;encouraged to call for assist;side rails up x2  -SJ           User Key  (r) = Recorded By, (t) = Taken By, (c) = Cosigned By    Initials Name Provider Type     Betty Ortega, OT Occupational Therapist               Outcome Measures     Row Name 03/02/23 1015          How much help from another is currently needed...    Putting on and taking off regular lower body clothing? 3  -SJ     Bathing (including  washing, rinsing, and drying) 3  -SJ     Toileting (which includes using toilet bed pan or urinal) 3  -SJ     Putting on and taking off regular upper body clothing 4  -SJ     Taking care of personal grooming (such as brushing teeth) 3  -SJ     Eating meals 4  -SJ     AM-PAC 6 Clicks Score (OT) 20  -SJ     Row Name 03/02/23 0841 03/02/23 0724       How much help from another person do you currently need...    Turning from your back to your side while in flat bed without using bedrails? 4  -CZ 4  -HB    Moving from lying on back to sitting on the side of a flat bed without bedrails? 4  -CZ 4  -HB    Moving to and from a bed to a chair (including a wheelchair)? 4  -CZ 4  -HB    Standing up from a chair using your arms (e.g., wheelchair, bedside chair)? 4  -CZ 4  -HB    Climbing 3-5 steps with a railing? 3  -CZ 3  -HB    To walk in hospital room? 3  -CZ 3  -HB    AM-PAC 6 Clicks Score (PT) 22  -CZ 22  -HB    Row Name 03/02/23 1015 03/02/23 0841       Functional Assessment    Outcome Measure Options AM-PAC 6 Clicks Daily Activity (OT)  -SJ Tinetti  -CZ          User Key  (r) = Recorded By, (t) = Taken By, (c) = Cosigned By    Initials Name Provider Type    CZ Roger Puentes, PT Physical Therapist    Harini Alford, RN Registered Nurse     Betty Ortega, OT Occupational Therapist                Occupational Therapy Education     Title: PT OT SLP Therapies (In Progress)     Topic: Occupational Therapy (In Progress)     Point: ADL training (Done)     Description:   Instruct learner(s) on proper safety adaptation and remediation techniques during self care or transfers.   Instruct in proper use of assistive devices.              Learning Progress Summary           Patient Acceptance, E,TB, VU by  at 3/2/2023 1040    Comment: POC, role of OT                   Point: Home exercise program (Not Started)     Description:   Instruct learner(s) on appropriate technique for monitoring, assisting and/or progressing  therapeutic exercises/activities.              Learner Progress:  Not documented in this visit.          Point: Precautions (Not Started)     Description:   Instruct learner(s) on prescribed precautions during self-care and functional transfers.              Learner Progress:  Not documented in this visit.          Point: Body mechanics (Not Started)     Description:   Instruct learner(s) on proper positioning and spine alignment during self-care, functional mobility activities and/or exercises.              Learner Progress:  Not documented in this visit.                      User Key     Initials Effective Dates Name Provider Type Discipline     06/14/21 -  Betty Ortega OT Occupational Therapist OT              OT Recommendation and Plan  Planned Therapy Interventions (OT): activity tolerance training, adaptive equipment training, BADL retraining, edema control/reduction, cognitive/visual perception retraining, functional balance retraining, IADL retraining, manual therapy/joint mobilization, occupation/activity based interventions, neuromuscular control/coordination retraining, passive ROM/stretching, patient/caregiver education/training, strengthening exercise, transfer/mobility retraining, ROM/therapeutic exercise  Therapy Frequency (OT): other (see comments) (5-7 d/wk)  Plan of Care Review  Plan of Care Reviewed With: patient  Outcome Evaluation: OT eval complete, patient alert x4, agreeable for evaluation. Supine<>sit with modfiied independence. Sit to stand, toilet t/f, functional mobility with SBA (no AE used). Patient SOA with funcitonal mobility (bed<>BR) with SPO2 desaturating to 86% on 2 L NC, HR up to 102 bpm. With PLB and 3 mins, patient up to 91% on 2 L NC. LE dressing, toileting with SBA. Up in bed, all needs inr each. Patient with decreased activity tolerance, decreased safety in transfers, decreased activity tolerance/endurance, and decreased safety in ADLs. Cont inpatient OT. Patient would  benefit from  OT, however, she may not be amenable.     Time Calculation:    Time Calculation- OT     Row Name 03/02/23 1042             Time Calculation- OT    OT Start Time 1015  -      OT Stop Time 1040  -      OT Time Calculation (min) 25 min  -      OT Received On 03/02/23  -      OT Goal Re-Cert Due Date 03/15/23  -         Untimed Charges    OT Eval/Re-eval Minutes 25  -         Total Minutes    Untimed Charges Total Minutes 25  -       Total Minutes 25  -            User Key  (r) = Recorded By, (t) = Taken By, (c) = Cosigned By    Initials Name Provider Type     Betty Ortega, OT Occupational Therapist              Therapy Charges for Today     Code Description Service Date Service Provider Modifiers Qty    [id number] HC OT EVAL LOW COMPLEXITY 2 3/2/2023 Betty Ortega OT GO 1               Betty Ortega OT  3/2/2023

## 2023-03-02 NOTE — THERAPY EVALUATION
Patient Name: Dana Ruiz  : 1945    MRN: [id number]                              Today's Date: 3/2/2023       Admit Date: 2023    Visit Dx:     ICD-10-CM ICD-9-CM   1. COPD exacerbation (HCC)  J44.1 491.21   2. Impaired mobility and ADLs  Z74.09 V49.89    Z78.9    3. Impaired functional mobility, balance, gait, and endurance  Z74.09 V49.89     Patient Active Problem List   Diagnosis   • Hypoxia   • Stage 3b chronic kidney disease (HCC)   • Type 2 diabetes mellitus without complication (HCC)   • Allergic rhinitis   • Anemia in chronic kidney disease   • Benign essential hypertension   • Congestive heart failure (HCC)   • Gastro-esophageal reflux disease with esophagitis   • Generalized osteoarthritis   • Hyperlipidemia   • Restrictive lung disease   • PVC's (premature ventricular contractions)   • CHF exacerbation (HCC)   • Severe malnutrition (HCC)     Past Medical History:   Diagnosis Date   • Asthma    • Basal cell carcinoma of left lower eyelid    • Blepharitis     Controlled   • Congestive heart failure (HCC)    • Diabetes mellitus (HCC)     Type 2 diabetes mellitus - no BDR       • Hypercholesterolemia    • Hypertension    • Nuclear senile cataract      Past Surgical History:   Procedure Laterality Date   •  SECTION  1978     Section (2)      • COMBINED HYSTEROSCOPY DIAGNOSTIC / D & C  2005    Hysteroscope procedure (1)      • CYSTOSCOPY  1969    Cystoscopy (1)     • EYELID CARCINOMA EXCISION  2003    Remove eyelid lesion(s) (1)         General Information     Row Name 23 0825          Physical Therapy Time and Intention    Document Type evaluation  -CZ     Mode of Treatment physical therapy  -CZ     Row Name 23 0825          General Information    Patient Profile Reviewed yes  -CZ     Prior Level of Function independent:;all household mobility  -CZ     Existing Precautions/Restrictions oxygen therapy device and L/min  -CZ     Row Name  03/02/23 0825          Living Environment    People in Home alone  -CZ     Row Name 03/02/23 0825          Home Main Entrance    Number of Stairs, Main Entrance none  -CZ     Row Name 03/02/23 0825          Stairs Within Home, Primary    Stairs, Within Home, Primary Has home O2 but has not need to use it lately. Ambulates with 4WW recently. Bedrooms upstairs.  -CZ     Number of Stairs, Within Home, Primary twelve  -CZ     Stair Railings, Within Home, Primary railing on right side (ascending)  -CZ     Row Name 03/02/23 0825          Cognition    Orientation Status (Cognition) oriented x 4  -CZ     Row Name 03/02/23 0825          Safety Issues, Functional Mobility    Impairments Affecting Function (Mobility) strength;endurance/activity tolerance;balance  -CZ           User Key  (r) = Recorded By, (t) = Taken By, (c) = Cosigned By    Initials Name Provider Type    Roger Thompson, PT Physical Therapist               Mobility     Row Name 03/02/23 0825          Bed Mobility    Bed Mobility supine-sit;sit-supine  -CZ     Supine-Sit [place] (Bed Mobility) modified independence  -CZ     Sit-Supine [place] (Bed Mobility) modified independence  -CZ     Assistive Device (Bed Mobility) head of bed elevated;bed rails  -CZ     Row Name 03/02/23 0825          Bed-Chair Transfer    Bed-Chair [place] (Transfers) independent  -CZ     Row Name 03/02/23 0825          Sit-Stand Transfer    Sit-Stand [place] (Transfers) independent  -CZ     Row Name 03/02/23 0825          Gait/Stairs (Locomotion)    [place] Level (Gait) supervision  -CZ     Distance in Feet (Gait) 30'x1, 5'x2.  -CZ     Comment, (Gait/Stairs) Slow pa, reaches for UE support, LOB x 1 turning L, able to catch self.  Resistent to use of walker.  -CZ           User Key  (r) = Recorded By, (t) = Taken By, (c) = Cosigned By    Initials Name Provider Type    Roger Thompson, PT Physical Therapist               Obj/Interventions     Row Name  03/02/23 0825          Range of Motion Comprehensive    General Range of Motion bilateral lower extremity ROM WFL  -CZ     Row Name 03/02/23 0825          Strength Comprehensive (MMT)    Comment, General Manual Muscle Testing (MMT) Assessment BLEs: 4-/5 grossly.  -CZ     Row Name 03/02/23 0825          Sensory Assessment (Somatosensory)    Sensory Assessment (Somatosensory) LE sensation intact  -CZ           User Key  (r) = Recorded By, (t) = Taken By, (c) = Cosigned By    Initials Name Provider Type    CZ Roger Puentes, PT Physical Therapist               Goals/Plan     Row Name 03/02/23 0825          Bed Mobility Goal 1 (PT)    Activity/Assistive Device (Bed Mobility Goal 1, PT) sit to supine/supine to sit  -CZ     [place] Level/Cues Needed (Bed Mobility Goal 1, PT) independent  -CZ     Time Frame (Bed Mobility Goal 1, PT) by discharge  -CZ     Strategies/Barriers (Bed Mobility Goal 1, PT) HOB flat, no bed rails.  -CZ     Progress/Outcomes (Bed Mobility Goal 1, PT) goal not met  -CZ     Row Name 03/02/23 0825          Gait Training Goal 1 (PT)    Activity/Assistive Device (Gait Training Goal 1, PT) gait (walking locomotion)  -CZ     [place] Level (Gait Training Goal 1, PT) independent  -CZ     Distance (Gait Training Goal 1, PT) 150'x1.  -CZ     Strategies/Barriers (Gait Training Goal 1, PT) Maintain SpO2 >90%.  -CZ     Progress/Outcome (Gait Training Goal 1, PT) goal not met  -CZ     Row Name 03/02/23 0825          Stairs Goal 1 (PT)    Activity/Assistive Device (Stairs Goal 1, PT) using handrail, right  -CZ     [place] Level/Cues Needed (Stairs Goal 1, PT) modified independence  -CZ     Number of Stairs (Stairs Goal 1, PT) 13 steps.  -CZ     Time Frame (Stairs Goal 1, PT) by discharge  -CZ     Strategies/Barriers (Stairs Goal 1, PT) Maintain SpO2 >90%.  -CZ     Progress/Outcome (Stairs Goal 1, PT) goal not met  -CZ           User Key  (r) = Recorded By, (t) = Taken By, (c) = Cosigned By     Initials Name Provider Type    CZ Roger Puentes, PT Physical Therapist               Clinical Impression     Row Name 03/02/23 0825          Pain    Pretreatment Pain Rating 0/10 - no pain  -CZ     Posttreatment Pain Rating 0/10 - no pain  -CZ     Row Name 03/02/23 0825          Plan of Care Review    Plan of Care Reviewed With patient  -CZ     Outcome Evaluation Initial PT evaluation complete.  Patient is alert, fairly cooperative.  She demonstrates (I) with bed mobility and transfers, requires SPV with gait.  Patient ambulates 30'x1 without an AD, slow pa, able to manage O2 tubing, LOB x1 with 90º turn to L, no fall, able to self correct.  Tinetti assessed: 24/28 or low fall risk.  FWW is recommended but patient is resistent, reports she only recently started using her rollator at home.  SpO2 85% (patient had removed O2 prior to PT's arrival), returned to 91% within 4 min after re-application of O2.  Goals established, continue skilled I/P PT.  -     Row Name 03/02/23 0825          Therapy Assessment/Plan (PT)    Rehab Potential (PT) good, to achieve stated therapy goals  -     Criteria for Skilled Interventions Met (PT) yes;skilled treatment is necessary  -CZ     Therapy Frequency (PT) 5 times/wk  -CZ     Row Name 03/02/23 0825          Vital Signs    Pretreatment Heart Rate (beats/min) 92  -CZ     Pre SpO2 (%) 85  -CZ     O2 Delivery Pre Treatment room air  Patient had removed O2.  -CZ     Intra SpO2 (%) 91  -CZ     O2 Delivery Intra Treatment nasal cannula  2 lpm, same as home.  -CZ     Pre Patient Position Supine  -CZ     Intra Patient Position Supine  -CZ     Row Name 03/02/23 0825          Positioning and Restraints    Pre-Treatment Position in bed  -CZ     Post Treatment Position bed  -CZ     In Bed supine;call light within reach;encouraged to call for assist  Signed bed alarm refusal.  -CZ           User Key  (r) = Recorded By, (t) = Taken By, (c) = Cosigned By    Initials Name Provider Type     "CZ Roger Puentes, PT Physical Therapist               Outcome Measures     Row Name 03/02/23 0841 03/02/23 0724       How much help from another person do you currently need...    Turning from your back to your side while in flat bed without using bedrails? 4  -CZ 4  -HB    Moving from lying on back to sitting on the side of a flat bed without bedrails? 4  -CZ 4  -HB    Moving to and from a bed to a chair (including a wheelchair)? 4  -CZ 4  -HB    Standing up from a chair using your arms (e.g., wheelchair, bedside chair)? 4  -CZ 4  -HB    Climbing 3-5 steps with a railing? 3  -CZ 3  -HB    To walk in hospital room? 3  -CZ 3  -HB    AM-PAC 6 Clicks Score (PT) 22  -CZ 22  -HB    Row Name 03/02/23 0841          Tinetti Assessment    Tinetti Assessment yes  -CZ     Sitting Balance 1  -CZ     Arises 2  -CZ     Attempts to Rise 2  -CZ     Immediate Standing Balance (first 5 sec) 2  -CZ     Standing Balance 1  -CZ     Sternal Nudge (feet close together) 1  -CZ     Eyes Closed (feet close together) 1  -CZ     Turning 360 Degrees- Steps 1  -CZ     Turning 360 Degrees- Steadiness 1  -CZ     Sitting Down 2  -CZ     Tinetti Balance Score 14  -CZ     Gait Initiation (immediate after told \"go\") 1  -CZ     Step Length- Right Swing 1  -CZ     Step Length- Left Swing 1  -CZ     Foot Clearance- Right Foot 1  -CZ     Foot Clearance- Left Foot 1  -CZ     Step Symmetry 1  -CZ     Step Continuity 1  -CZ     Path (excursion) 1  -CZ     Trunk 1  -CZ     Base of Support 1  -CZ     Gait Score 10  -CZ     Tinetti Total Score 24  -CZ     Tinetti Assessment Comments No AD, LOB x 1, no fall.  -CZ     Row Name 03/02/23 1015 03/02/23 0841       Functional Assessment    Outcome Measure Options AM-PAC 6 Clicks Daily Activity (OT)  -SJ Tinetti  -CZ          User Key  (r) = Recorded By, (t) = Taken By, (c) = Cosigned By    Initials Name Provider Type    CZ Roger Puentes, PT Physical Therapist    Harini Alford, RN Registered Nurse    SJ " Betty Ortega, OT Occupational Therapist                             Physical Therapy Education     Title: PT OT SLP Therapies (In Progress)     Topic: Physical Therapy (In Progress)     Point: Mobility training (Not Started)     Learner Progress:  Not documented in this visit.          Point: Home exercise program (Not Started)     Learner Progress:  Not documented in this visit.          Point: Body mechanics (Not Started)     Learner Progress:  Not documented in this visit.          Point: Precautions (Done)     Learning Progress Summary           Patient Acceptance, E, VU by  at 3/2/2023 0843    Comment: Tinetti assessed: 24/28 or low fall risk, recommend use of walker.                               User Key     Initials Effective Dates Name Provider Type Discipline     09/18/22 -  Roger Puentes, PT Physical Therapist PT              PT Recommendation and Plan     Plan of Care Reviewed With: patient  Outcome Evaluation: Initial PT evaluation complete.  Patient is alert, fairly cooperative.  She demonstrates (I) with bed mobility and transfers, requires SPV with gait.  Patient ambulates 30'x1 without an AD, slow pa, able to manage O2 tubing, LOB x1 with 90º turn to L, no fall, able to self correct.  Tinetti assessed: 24/28 or low fall risk.  FWW is recommended but patient is resistent, reports she only recently started using her rollator at home.  SpO2 85% (patient had removed O2 prior to PT's arrival), returned to 91% within 4 min after re-application of O2.  Goals established, continue skilled I/P PT.     Time Calculation:    PT Charges     Row Name 03/02/23 1102             Time Calculation    Start Time 0825  -CZ      Stop Time 0905  -CZ      Time Calculation (min) 40 min  -CZ      PT Received On 03/02/23  -      PT Goal Re-Cert Due Date 03/15/23  -         Untimed Charges    PT Eval/Re-eval Minutes 40  -CZ         Total Minutes    Untimed Charges Total Minutes 40  -CZ       Total Minutes  40  -CZ            User Key  (r) = Recorded By, (t) = Taken By, (c) = Cosigned By    Initials Name Provider Type    CZ Roger Puentes, PT Physical Therapist              Therapy Charges for Today     Code Description Service Date Service Provider Modifiers Qty    [id number]  PT EVAL MOD COMPLEXITY 3 3/2/2023 Roger Puentes, PT GP 1          PT G-Codes  Outcome Measure Options: AM-PAC 6 Clicks Daily Activity (OT)  AM-PAC 6 Clicks Score (PT): 22  AM-PAC 6 Clicks Score (OT): 20  Tinetti Total Score: 24  PT Discharge Summary  Anticipated Discharge Disposition (PT): home    Roger Puentes, VERONICA  3/2/2023

## 2023-03-02 NOTE — SIGNIFICANT NOTE
Patient is not interested in having a heart cath during this admission. Will follow-up with Dr. Wagoner. Dr. Wagoner aware.

## 2023-03-03 NOTE — OUTREACH NOTE
Prep Survey    Flowsheet Row Responses   [facility] patient discharged from? [place]   Is LACE score < 7 ? No   Eligibility [facility]   Date of Admission 02/28/23   Date of Discharge 03/02/23   Discharge Disposition Home or Self Care   Discharge diagnosis CHF exacerbation    Does the patient have one of the following disease processes/diagnoses(primary or secondary)? CHF   Does the patient have Home health ordered? No   Is there a DME ordered? Yes   What DME was ordered? [facility]   O2   Prep survey completed? Yes          AB FAGAN - Registered Nurse

## 2023-03-03 NOTE — OUTREACH NOTE
Call Center TCM Note    Flowsheet Row Responses   [facility] patient discharged from? [place]   Does the patient have one of the following disease processes/diagnoses(primary or secondary)? CHF   TCM attempt successful? Yes  [D[facility]]   Call start time 1358   Call end time 1401   Discharge diagnosis CHF exacerbation    Meds reviewed with patient/caregiver? Yes   Does the patient have all medications ordered at discharge? N/A   Is the patient taking all medications as directed (includes completed medication regime)? Yes   Comments HOSP DC FU appt 3/8/23 @ 4pm.   Does the patient have an appointment with their PCP within 7 days of discharge? Yes   Has home health visited the patient within 72 hours of discharge? N/A   What DME was ordered? [facility]   O2   Has all DME been delivered? Yes   Pulse Ox monitoring Intermittent   Pulse Ox device source Patient   O2 Sat comments 94 and above    O2 Sat: education provided Sat levels, Monitoring frequency, When to seek care   Psychosocial issues? No   Did the patient receive a copy of their discharge instructions? Yes   Nursing interventions Reviewed instructions with patient   What is the patient's perception of their health status since discharge? Improving   Nursing interventions Nurse provided patient education   Is the patient able to teach back signs and symptoms of worsening condition? (i.e. weight gain, shortness of air, etc.) Yes   Is the patient/caregiver able to teach back the hierarchy of who to call/visit for symptoms/problems? PCP, Specialist, Home health nurse, Urgent Care, ED, 911 Yes   Notified Case Management Education issues   Is the patient able to teach back Heart Failure Zones? No   CHF Zone this Call Green Zone   Green Zone Patient reports doing well, No new or worsening shortness of breath   Green Zone Interventions Daily weight check, Meds as directed   TCM call completed? Yes   Wrap up additional comments Pt  reports she is doing better    Call end time 1401           Cierra Chavez, DIAZ    3/3/2023, 14:02 CST

## 2023-03-08 NOTE — PROGRESS NOTES
"Transitional Care Follow Up Visit  Subjective     Dana Ruiz is a 77 y.o. female who presents for a transitional care management visit.    Within 48 business hours after discharge our office contacted her via telephone to coordinate her care and needs.      I reviewed and discussed the details of that call along with the discharge summary, hospital problems, inpatient lab results, inpatient diagnostic studies, and consultation reports with Dana.     Current outpatient and discharge medications have been reconciled for the patient.  Reviewed by: LUCIA Bernardo      Date of TCM Phone Call 3/2/2023   [facility]   Date of Admission 2/28/2023   Date of Discharge 3/2/2023   Discharge Disposition Home or Self Care     Risk for Readmission (LACE) Score: 8 (3/2/2023  5:00 AM)      History of Present Illness   Course During Hospital Stay:      Copied from chart    \"   Chief Complaint on Day of Discharge: sitting up feeling better today     Hospital Course:  The patient is a 77 y.o. female who presented to [facility] secondary to worsening dyspnea and swelling due to CHF exacerbation for which she needed increased diuresis and supplemental O2 along with repeat Echo; needed time to be medically optimized before she started coming close to her baseline and O2 requirement at which point she was transitioned to PO medicines. Cardiology input appreciated. Therapy worked with her while here. other chronic issues were medically managed. See notes for details.     Condition on Discharge:  stable\"     The following portions of the patient's history were reviewed and updated as appropriate: allergies, current medications, past family history, past medical history, past social history, past surgical history and problem list.    Review of Systems    Objective   Physical Exam  Vitals and nursing note reviewed.   Constitutional:       Appearance: She is well-developed.   HENT:      Head: " Normocephalic and atraumatic.   Eyes:      General: Lids are normal.      Conjunctiva/sclera: Conjunctivae normal.   Neck:      Thyroid: No thyroid mass or thyromegaly.      Trachea: Trachea normal. No tracheal tenderness.   Cardiovascular:      Rate and Rhythm: Normal rate.      Pulses: Normal pulses.      Heart sounds: Normal heart sounds.   Pulmonary:      Effort: Accessory muscle usage present. No respiratory distress.      Breath sounds: Normal breath sounds. No wheezing, rhonchi or rales.      Comments: On oxygen in office   Abdominal:      General: There is no distension.      Palpations: Abdomen is soft. There is no mass.   Musculoskeletal:         General: Normal range of motion.      Cervical back: Normal range of motion. No edema.   Skin:     General: Skin is warm and dry.      Coloration: Skin is not pale.      Findings: No abrasion, erythema or lesion.   Neurological:      Mental Status: She is alert and oriented to person, place, and time.   Psychiatric:         Mood and Affect: Mood is not anxious. Affect is not inappropriate.         Speech: Speech normal.         Behavior: Behavior normal.         Thought Content: Thought content normal.         Judgment: Judgment normal. Judgment is not impulsive.         Assessment & Plan   Diagnoses and all orders for this visit:    1. Hospital discharge follow-up (Primary)    2. Type 2 diabetes mellitus with other specified complication, without long-term current use of insulin (HCC)  -     Hemoglobin A1c; Future    3. COPD with acute exacerbation (HCC)      Labs reviewed     Has an appointment with Pulmonology as well as Cardiology coming up  Encouraged to keep these appointments     She would like to hold Jardiance since her last a1c was drawn after she had been on a coarse of steroids.     We will repeat a1c in 3 months       The current medical regimen is effective;  continue present plan and medications.        Return if symptoms worsen or fail to improve,  for Next scheduled follow up.          This document has been electronically signed by LUCIA Bernardo on March 9, 2023 08:26 CST

## 2023-03-08 NOTE — PROGRESS NOTES
"Chief Complaint  Follow-up (Hospital )    Subjective          Dana Ruiz presents to [facility] [place]  History of Present Illness  Outpatient Medications Prior to Visit   Medication Sig Dispense Refill   • acetaminophen (TYLENOL) 500 MG tablet Take 1 tablet by mouth Every 6 (Six) Hours As Needed for Mild Pain.     • aspirin 81 MG EC tablet Take 1 tablet by mouth.     • Cholecalciferol 50 MCG (2000 UT) tablet Take 1 tablet by mouth.     • furosemide (LASIX) 40 MG tablet Take 1 tablet by mouth Daily. 30 tablet 11   • lisinopril (PRINIVIL,ZESTRIL) 5 MG tablet Take 1 tablet by mouth Daily. 30 tablet 11   • metFORMIN (GLUCOPHAGE) 500 MG tablet Take 1 tablet by mouth Daily With Breakfast. 30 tablet 11   • metoprolol succinate XL (Toprol XL) 25 MG 24 hr tablet Take 1 tablet by mouth Daily. 30 tablet 6   • Misc. Devices (Rollator Ultra-Light) misc Use daily as needed 1 each 0   • trolamine salicylate (ASPERCREME) 10 % cream Apply 1 application topically to the appropriate area as directed As Needed for Muscle / Joint Pain.     • budesonide (PULMICORT) 0.5 MG/2ML nebulizer solution Take 2 mL by nebulization 2 (Two) Times a Day. 60 each 11   • empagliflozin (Jardiance) 10 MG tablet tablet Take 1 tablet by mouth Daily. 30 tablet 11   • ipratropium-albuterol (DUO-NEB) 0.5-2.5 mg/3 ml nebulizer Take 3 mL by nebulization Every 4 (Four) Hours As Needed for Wheezing or Shortness of Air. 360 mL 5     No facility-administered medications prior to visit.       Review of Systems      Objective   Vital Signs:   Visit Vitals  /54 (BP Location: Right arm, Patient Position: Sitting, Cuff Size: Adult)   Pulse 54   Ht 160 cm (62.99\")   Wt 41.7 kg (92 lb)   SpO2 92%   BMI 16.30 kg/m²     Physical Exam   Result Review :                 Assessment and Plan    There are no diagnoses linked to this encounter.    Follow Up   No follow-ups on file.  Patient was given instructions and " counseling regarding her condition or for health maintenance advice. Please see specific information pulled into the AVS if appropriate.           This document has been electronically signed by LUCIA Bernardo on March 8, 2023 16:28 CST

## 2023-03-13 NOTE — PROGRESS NOTES
[facility] Cardiology  OFFICE NOTE    Cardiovascular Medicine  Bony Wagoner M.D., [facility]         No referring provider defined for this encounter.    History of Present Illness    Dana Ruiz is a 77 y.o. female who presents for a follow up visit today.   The patient was evaluated during a recent hospitalization in March 2023 at the request of Dr. See.  She has a known history of LV systolic dysfunction with LVEF ranging from 40-50% on prior transthoracic echocardiograms.  Dr. See arranged an outpatient cardiac monitor at the recent clinic visit for evaluation of PVCs.  Based on report received from Dr. See, the patient was found to have a few runs of nonsustained wide-complex tachycardia and occasional PVCs on cardiac monitor.  During her hospitalization in March 2023, she was found to have mildly elevated serum high-sensitivity troponin T levels.  Serum proBNP level was elevated at 8188 pg/ml.  Based on elevated serum troponin levels, LV systolic dysfunction noted on echocardiography and nonsustained VT on outpatient cardiac monitoring, Dr. See recommended proceeding with [facility]/coronary angiography for evaluation of obstructive CAD.  This was discussed with the patient and her daughter during the inpatient hospitalization; they opted to hold off at that time and revisit this on an outpatient visit.  The patient has a known diagnosis of interstitial lung disease and has been started on oxygen therapy since her discharge from the hospital in March 2023.  The patient denies any chest discomfort at this point.  She has had some episodes of heart racing.  She has not had any presyncope or syncope.  She has chronic dyspnea on exertion.  She has not had any PND, orthopnea or significant leg swelling.  She appears to be taking aspirin and metoprolol on a regular basis.    Review of Systems - ROS  Constitution: Negative for weight gain.   HENT: Negative for congestion.    Eyes:  Negative for blurred vision.   Cardiovascular: As mentioned above  Respiratory: Negative for hemoptysis.    Positive for dyspnea on exertion  Endocrine: Negative for polydipsia and polyuria.   Hematologic/Lymphatic: Negative for bleeding problem. Does not bruise/bleed easily.   Skin: Negative for flushing.   Musculoskeletal: Negative for neck pain and stiffness.   Gastrointestinal: Negative for abdominal pain, nausea and vomiting.   Genitourinary: Negative for dysuria and hematuria.   Neurological: Negative for focal weakness and numbness.   Psychiatric/Behavioral: Negative for altered mental status and depression.      All other systems were reviewed and were negative.    Past Medical History:   Diagnosis Date   • Asthma    • Basal cell carcinoma of left lower eyelid    • Blepharitis     Controlled   • Congestive heart failure (HCC)    • Diabetes mellitus (HCC)     Type 2 diabetes mellitus - no BDR       • Hypercholesterolemia    • Hypertension    • Nuclear senile cataract        Family History:  family history includes Cancer in an other family member; Diabetes in an other family member; Heart disease in an other family member; Hypertension in an other family member; Leukemia in an other family member; Lung cancer in an other family member; Uterine cancer in an other family member.    Social History: Denies current or past tobacco, alcohol or illicit drug use.   reports that she has never smoked. She does not have any smokeless tobacco history on file. She reports that she does not drink alcohol and does not use drugs.    Allergies:  Allergies   Allergen Reactions   • Polycillin [Ampicillin]          Current Outpatient Medications:   •  acetaminophen (TYLENOL) 500 MG tablet, Take 1 tablet by mouth Every 6 (Six) Hours As Needed for Mild Pain., Disp: , Rfl:   •  aspirin 81 MG EC tablet, Take 1 tablet by mouth., Disp: , Rfl:   •  Cholecalciferol 50 MCG (2000 UT) tablet, Take 1 tablet by mouth., Disp: , Rfl:   •   "furosemide (LASIX) 40 MG tablet, Take 1 tablet by mouth Daily., Disp: 30 tablet, Rfl: 11  •  ipratropium-albuterol (DUO-NEB) 0.5-2.5 mg/3 ml nebulizer, Take 3 mL by nebulization Every 4 (Four) Hours As Needed for Wheezing or Shortness of Air., Disp: 360 mL, Rfl: 5  •  lisinopril (PRINIVIL,ZESTRIL) 5 MG tablet, Take 1 tablet by mouth Daily., Disp: 30 tablet, Rfl: 11  •  metFORMIN (GLUCOPHAGE) 500 MG tablet, Take 1 tablet by mouth Daily With Breakfast., Disp: 30 tablet, Rfl: 11  •  metoprolol succinate XL (Toprol XL) 25 MG 24 hr tablet, Take 1 tablet by mouth Daily., Disp: 30 tablet, Rfl: 6  •  Misc. Devices (Rollator Ultra-Light) misc, Use daily as needed, Disp: 1 each, Rfl: 0  •  trolamine salicylate (ASPERCREME) 10 % cream, Apply 1 application topically to the appropriate area as directed As Needed for Muscle / Joint Pain., Disp: , Rfl:   •  empagliflozin (Jardiance) 10 MG tablet tablet, Take 1 tablet by mouth Daily., Disp: 30 tablet, Rfl: 11    Physical Exam:  Vitals:    03/13/23 0942   BP: 112/70   BP Location: Left arm   Patient Position: Sitting   Cuff Size: Adult   Pulse: 72   Weight: 44 kg (97 lb)   Height: 160 cm (63\")   PainSc: 0-No pain     Current Pain Level: none  Pulse Ox: Normal  on nasal cannula oxygen  General: alert, appears stated age and cooperative, uses a walker to walk  Body Habitus: Underweight  HEENT: Head: Normocephalic, no lesions, without obvious abnormality.    Neuro: alert, oriented x3  JVP: Volume/Pulsation: Normal.  Normal waveforms.   Appropriate inspiratory decrease.   Carotid Exam: no bruit normal pulsation bilaterally    Respirations: Mildly tachypneic, no acute dyspnea     Pulmonary: Equal air entry bilaterally, no crackles or wheezes  Heart rate: normal    Heart Rhythm: regular     Heart Sounds: S1: normal  S2: Present  S3: absent     Abdomen:   Appearance: normal .  Palpation: Soft, non-tender to palpation, bowel sounds positive in all four quadrants  Extremity: no edema. "       DATA REVIEWED:     EKG. I personally reviewed and interpreted the EKG.  normal sinus rhythm, nonspecific intraventricular conduction delay, cannot rule out anterolateral infarct on 2/28/2023    ECG/EMG Results (all)     None        ---------------------------------------------------  TTE/ELSIE:  Results for orders placed during the hospital encounter of 02/28/23    Adult Transthoracic Echo Complete w/ Color, Spectral and Contrast if Necessary Per Protocol    Interpretation Summary  •  Left ventricular ejection fraction appears to be 46 - 50%.  •  Left ventricular diastolic function is consistent with (grade I) impaired relaxation.  •  The right atrial cavity is mildly  dilated.  •  Moderate tricuspid valve regurgitation is present.  •  Estimated right ventricular systolic pressure from tricuspid regurgitation is markedly elevated (>55 mmHg).  •  Severe pulmonary hypertension is present.      -----------------------------------------------------  CXR/Imaging:   Imaging Results (Most Recent)     None          -----------------------------------------------------  CT:   XR Chest PA & Lateral    Result Date: 2/28/2023  Cardiomegaly and extensive basilar interstitial fibrosis. Similar findings seen on CT chest December 20, 2022. Electronically signed by:  Meño Hutton MD  2/28/2023 4:36 PM [facility] Workstation: [id number]      ----------------------------------------------------      --------------------------------------------------------------------------------------------------  LABS:     The CVD Risk score (Sydnee et al., 2008) failed to calculate for the following reasons:    The 2008 CVD risk score is only valid for ages 30 to 74    The patient has a prior MI, stroke, CHF, or peripheral vascular disease diagnosis         Lab Results   Component Value Date    GLUCOSE 145 (H) 03/02/2023    BUN 28 (H) 03/02/2023    CREATININE 1.15 (H) 03/02/2023    EGFRIFNONA 36 (L) 12/23/2021    BCR 24.3 03/02/2023    K 3.9  03/02/2023    CO2 30.0 (H) 03/02/2023    CALCIUM 9.4 03/02/2023    ALBUMIN 3.4 (L) 02/28/2023    AST 57 (H) 02/28/2023    ALT 61 (H) 02/28/2023     Lab Results   Component Value Date    WBC 8.21 02/28/2023    HGB 13.5 02/28/2023    HCT 41.6 02/28/2023    MCV 87.4 02/28/2023     02/28/2023     Lab Results   Component Value Date    CHOL 169 09/28/2022    TRIG 223 (H) 09/28/2022    HDL 74 (H) 09/28/2022    LDL 60 09/28/2022     Lab Results   Component Value Date    TSH 1.180 09/28/2022     Lab Results   Component Value Date    CKTOTAL 38 02/28/2023    TROPONINT 32 (H) 02/28/2023     Lab Results   Component Value Date    HGBA1C 10.00 (H) 02/20/2023     No results found for: DDIMER  Lab Results   Component Value Date    ALT 61 (H) 02/28/2023     Lab Results   Component Value Date    HGBA1C 10.00 (H) 02/20/2023    HGBA1C 7.30 (H) 09/28/2022     Lab Results   Component Value Date    MICROALBUR 1.3 09/28/2022    CREATININE 1.15 (H) 03/02/2023     Lab Results   Component Value Date    IRON 98 06/15/2021    TIBC 410 06/15/2021    FERRITIN 153.00 (H) 12/15/2020     No results found for: INR, PROTIME    [unfilled]    1. Cardiomyopathy, unspecified type (HCC)    2. PVC's (premature ventricular contractions)    3. Wide-complex tachycardia    The patient was evaluated during a recent hospitalization in March 2023 at the request of Dr. See.  She has a known history of LV systolic dysfunction with LVEF ranging from 40-50% on prior transthoracic echocardiograms.  Dr. See arranged an outpatient cardiac monitor at the recent clinic visit for evaluation of PVCs.  Based on report received from Dr. See, the patient was found to have a few runs of nonsustained wide-complex tachycardia and occasional PVCs on cardiac monitor.  During her hospitalization in March 2023, she was found to have mildly elevated serum high-sensitivity troponin T levels.  Serum proBNP level was elevated at 8188 pg/ml.  Based on elevated serum  troponin levels, LV systolic dysfunction noted on echocardiography, occasional PVCs and nonsustained wide QRS tachycardia on outpatient cardiac monitoring, Dr. See recommended proceeding with [facility]/coronary angiography for evaluation of obstructive CAD.  This was discussed with the patient and her daughter during the inpatient hospitalization; they opted to hold off at that time and revisit this on an outpatient visit.  Serum TSH level was normal in September 2022.  The patient appeared largely euvolemic on exam today.  She denied having any chest discomfort.  We had a detailed discussion about the patient's ongoing cardiac issues on today's visit.  I discussed the risks and benefits of LHC/coronary angiography in detail with her today.  After understanding these, she opted to withhold the procedure.  She is aware that diagnosis/treatment of obstructive CAD may be delayed with this approach and was okay with this.  We will continue Lasix at current doses for now.  I am going to schedule her for a follow-up appointment with Dr. See in a month for continued cardiac care.    4. Benign essential hypertension  It appears to be well controlled.        Prevention:  BMI is below normal parameters (malnutrition). Recommendations: referral to primary care      Dana Ruiz  reports that she has never smoked. She does not have any smokeless tobacco history on file..       Return in about 1 month (around 4/13/2023).  (with Dr. See)            Electronically signed by Bony Wagoner MD on 03/13/23 at 09:39 CDT

## 2023-03-17 NOTE — TELEPHONE ENCOUNTER
Just an FYI    Ms. Harrison call this morning stating she was having increased SOA, and cough.     Patient did not want to go to Urgent Care.  LUCIA Bergman had an opening at 10:15 and 11:15 this morning, she was offered both and took the 11:15 appointment

## 2023-03-17 NOTE — PROGRESS NOTES
Chief Complaint  Shortness of Breath (cough)    Subjective          Dana Ruiz presents to [facility] [place] with shortness of breath and a cough that doesn't seem to be getting better. She has not used     COPD  She complains of cough, difficulty breathing and shortness of breath. Associated symptoms include dyspnea on exertion. Pertinent negatives include no appetite change or trouble swallowing. Her past medical history is significant for COPD.   Shortness of Breath  This is a chronic problem. The current episode started more than 1 month ago. The problem has been waxing and waning. The symptoms are aggravated by any activity. She has tried rest and steroid inhalers for the symptoms. The treatment provided mild relief. Her past medical history is significant for chronic lung disease and COPD.   Breathing Problem  She complains of cough, difficulty breathing and shortness of breath. This is a recurrent problem. The current episode started more than 1 year ago. The problem occurs daily. The problem has been gradually worsening. Associated symptoms include dyspnea on exertion. Pertinent negatives include no appetite change or trouble swallowing. Her symptoms are aggravated by any activity. Her symptoms are alleviated by steroid inhaler. She reports minimal improvement on treatment. Her past medical history is significant for COPD.     No facility-administered medications prior to visit.     Outpatient Medications Prior to Visit   Medication Sig Dispense Refill   • acetaminophen (TYLENOL) 500 MG tablet Take 1 tablet by mouth Every 6 (Six) Hours As Needed for Mild Pain.     • aspirin 81 MG EC tablet Take 1 tablet by mouth.     • Cholecalciferol 50 MCG (2000 UT) tablet Take 1 tablet by mouth.     • furosemide (LASIX) 40 MG tablet Take 1 tablet by mouth Daily. 30 tablet 11   • ipratropium-albuterol (DUO-NEB) 0.5-2.5 mg/3 ml nebulizer Take 3 mL by nebulization  "Every 4 (Four) Hours As Needed for Wheezing or Shortness of Air. 360 mL 5   • lisinopril (PRINIVIL,ZESTRIL) 5 MG tablet Take 1 tablet by mouth Daily. 30 tablet 11   • metFORMIN (GLUCOPHAGE) 500 MG tablet Take 1 tablet by mouth Daily With Breakfast. 30 tablet 11   • metoprolol succinate XL (Toprol XL) 25 MG 24 hr tablet Take 1 tablet by mouth Daily. 30 tablet 6   • Misc. Devices (Rollator Ultra-Light) misc Use daily as needed 1 each 0   • trolamine salicylate (ASPERCREME) 10 % cream Apply 1 application topically to the appropriate area as directed As Needed for Muscle / Joint Pain.     • empagliflozin (Jardiance) 10 MG tablet tablet Take 1 tablet by mouth Daily. 30 tablet 11       Review of Systems   Constitutional: Negative for appetite change.   HENT: Negative for trouble swallowing.    Respiratory: Positive for cough and shortness of breath.    Cardiovascular: Positive for dyspnea on exertion.         Objective   Vital Signs:   Visit Vitals  /60 (BP Location: Left arm, Patient Position: Sitting, Cuff Size: Adult)   Pulse 57   Ht 160 cm (62.99\")   Wt 44.5 kg (98 lb)   SpO2 91%   BMI 17.36 kg/m²     Physical Exam  Vitals and nursing note reviewed.   Constitutional:       Appearance: She is well-developed.   HENT:      Head: Normocephalic and atraumatic.   Eyes:      General: Lids are normal.      Conjunctiva/sclera: Conjunctivae normal.   Neck:      Thyroid: No thyroid mass or thyromegaly.      Trachea: Trachea normal. No tracheal tenderness.   Cardiovascular:      Rate and Rhythm: Normal rate.      Pulses: Normal pulses.      Heart sounds: Normal heart sounds.   Pulmonary:      Effort: Accessory muscle usage and prolonged expiration present. No respiratory distress.      Breath sounds: Normal breath sounds. No decreased breath sounds, wheezing or rhonchi.   Abdominal:      General: There is no distension.      Palpations: Abdomen is soft. There is no mass.   Musculoskeletal:         General: Normal range of " motion.      Cervical back: Normal range of motion. No edema.   Skin:     General: Skin is warm and dry.      Coloration: Skin is not pale.      Findings: No abrasion, erythema or lesion.   Neurological:      Mental Status: She is alert and oriented to person, place, and time.   Psychiatric:         Mood and Affect: Mood is not anxious. Affect is not inappropriate.         Speech: Speech normal.         Behavior: Behavior normal.         Thought Content: Thought content normal.         Judgment: Judgment normal. Judgment is not impulsive.        Result Review :                 Assessment and Plan    Diagnoses and all orders for this visit:    1. Chronic obstructive pulmonary disease, unspecified COPD type (HCC) (Primary)  -     methylPREDNISolone (MEDROL) 4 MG dose pack; Take as directed on package instructions.  Dispense: 21 each; Refill: 0  -     XR Chest PA & Lateral    2. Cough, unspecified type  -     methylPREDNISolone (MEDROL) 4 MG dose pack; Take as directed on package instructions.  Dispense: 21 each; Refill: 0  -     XR Chest PA & Lateral    3. Type 2 diabetes mellitus with other specified complication, without long-term current use of insulin ([facility])  -     empagliflozin (Jardiance) 10 MG tablet tablet; Take 1 tablet by mouth Daily.  Dispense: 30 tablet; Refill: 11      Complete ordered lab work   We will call with results  Pending lab results we will discuss further treatment plan and monitoring     Start newly prescribed medications   Please call the office if you have issues       Use  previously prescribed medications/nebulizer treatments         Follow Up   Return if symptoms worsen or fail to improve, for Next scheduled follow up.  Patient was given instructions and counseling regarding her condition or for health maintenance advice. Please see specific information pulled into the AVS if appropriate.           This document has been electronically signed by LUCIA Bernardo on March 20, 2023 08:04  CDT

## 2023-03-18 PROBLEM — N17.9 AKI (ACUTE KIDNEY INJURY): Status: ACTIVE | Noted: 2023-01-01

## 2023-03-18 NOTE — ED PROVIDER NOTES
Subjective   History of Present Illness  77-year-old female with history of pulmonary fibrosis comes to the ER chief complaint of worsening shortness of breath.  She was seen in the ER several days ago and started on a steroid pack.  Today the patient started having sharp chest pain.  Patient recently started on 3 L nasal cannula oxygen at home.  She endorses an occasional cough.  Denies other symptoms.  Exertion makes her shortness of breath worse.  Rest makes it better.    History provided by:  Patient   used: No        Review of Systems   Constitutional: Positive for activity change. Negative for chills and fever.   HENT: Negative for drooling.    Eyes: Negative for redness.   Respiratory: Positive for cough and shortness of breath. Negative for chest tightness and wheezing.    Cardiovascular: Positive for chest pain. Negative for palpitations and leg swelling.   Gastrointestinal: Negative for abdominal pain, nausea and vomiting.   Genitourinary: Negative for flank pain.   Skin: Negative for color change.   Neurological: Negative for seizures.   Psychiatric/Behavioral: Negative for confusion.       Past Medical History:   Diagnosis Date   • Asthma    • Basal cell carcinoma of left lower eyelid    • Blepharitis     Controlled   • Congestive heart failure (HCC)    • Diabetes mellitus (HCC)     Type 2 diabetes mellitus - no BDR       • Hypercholesterolemia    • Hypertension    • Nuclear senile cataract        Allergies   Allergen Reactions   • Polycillin [Ampicillin]        Past Surgical History:   Procedure Laterality Date   •  SECTION  1978     Section (2)      • COMBINED HYSTEROSCOPY DIAGNOSTIC / D & C  2005    Hysteroscope procedure (1)      • CYSTOSCOPY  1969    Cystoscopy (1)     • EYELID CARCINOMA EXCISION  2003    Remove eyelid lesion(s) (1)          Family History   Problem Relation Age of Onset   • Cancer Other         other   • Diabetes Other     • Heart disease Other    • Hypertension Other    • Lung cancer Other    • Leukemia Other    • Uterine cancer Other        Social History     Socioeconomic History   • Marital status:    Tobacco Use   • Smoking status: Never   Vaping Use   • Vaping Use: Never used   Substance and Sexual Activity   • Alcohol use: No   • Drug use: Never   • Sexual activity: Defer           Objective    Vitals:    03/18/23 1820 03/18/23 1917 03/18/23 1918 03/18/23 2019   BP: 125/67 119/67 119/67 108/72   BP Location:  Left arm  Right arm   Patient Position:  Lying  Lying   Pulse: 110 115 114    Resp:  18     Temp:       TempSrc:       SpO2:  98% 100% 100%   Weight:       Height:           Physical Exam  Vitals and nursing note reviewed.   Constitutional:       General: She is not in acute distress.     Appearance: She is well-developed. She is not ill-appearing, toxic-appearing or diaphoretic.   Cardiovascular:      Rate and Rhythm: Tachycardia present.   Pulmonary:      Effort: Pulmonary effort is normal. No accessory muscle usage or respiratory distress.      Breath sounds: No wheezing.   Chest:      Chest wall: No tenderness.   Abdominal:      Palpations: Abdomen is soft.      Tenderness: There is no abdominal tenderness (deep palpation).   Skin:     General: Skin is warm and dry.      Capillary Refill: Capillary refill takes less than 2 seconds.   Neurological:      Mental Status: She is alert and oriented to person, place, and time.         ECG 12 Lead      Date/Time: 3/18/2023 8:14 PM  Performed by: Byron James MD  Authorized by: Byron James MD   Interpreted by physician  Rhythm: sinus tachycardia  Ectopy: PVCs  Rate: tachycardic  QRS axis: normal  ST segment elevation noted on lead: none.  Clinical impression: abnormal ECG                 ED Course      Results for orders placed or performed during the hospital encounter of 03/18/23   Comprehensive Metabolic Panel    Specimen: Blood   Result Value Ref Range     Glucose 384 (H) 65 - 99 mg/dL    BUN 53 (H) 8 - 23 mg/dL    Creatinine 1.97 (H) 0.57 - 1.00 mg/dL    Sodium 131 (L) 136 - 145 mmol/L    Potassium 5.3 (H) 3.5 - 5.2 mmol/L    Chloride 90 (L) 98 - 107 mmol/L    CO2 18.0 (L) 22.0 - 29.0 mmol/L    Calcium 9.2 8.6 - 10.5 mg/dL    Total Protein 6.3 6.0 - 8.5 g/dL    Albumin 3.9 3.5 - 5.2 g/dL    ALT (SGPT) 98 (H) 1 - 33 U/L    AST (SGOT) 92 (H) 1 - 32 U/L    Alkaline Phosphatase 233 (H) 39 - 117 U/L    Total Bilirubin 1.2 0.0 - 1.2 mg/dL    Globulin 2.4 gm/dL    A/G Ratio 1.6 g/dL    BUN/Creatinine Ratio 26.9 (H) 7.0 - 25.0    Anion Gap 23.0 (H) 5.0 - 15.0 mmol/L    eGFR 25.6 (L) >60.0 mL/min/1.73   BNP    Specimen: Blood   Result Value Ref Range    proBNP 10,605.0 (H) 0.0 - 1,800.0 pg/mL   Single High Sensitivity Troponin T    Specimen: Blood   Result Value Ref Range    HS Troponin T 37 (H) <10 ng/L   CBC Auto Differential    Specimen: Blood   Result Value Ref Range    WBC 9.20 3.40 - 10.80 10*3/mm3    RBC 4.67 3.77 - 5.28 10*6/mm3    Hemoglobin 13.3 12.0 - 15.9 g/dL    Hematocrit 42.5 34.0 - 46.6 %    MCV 91.0 79.0 - 97.0 fL    MCH 28.5 26.6 - 33.0 pg    MCHC 31.3 (L) 31.5 - 35.7 g/dL    RDW 16.3 (H) 12.3 - 15.4 %    RDW-SD 52.6 37.0 - 54.0 fl    MPV 10.5 6.0 - 12.0 fL    Platelets 298 140 - 450 10*3/mm3    Neutrophil % 82.2 (H) 42.7 - 76.0 %    Lymphocyte % 11.2 (L) 19.6 - 45.3 %    Monocyte % 5.3 5.0 - 12.0 %    Eosinophil % 0.2 (L) 0.3 - 6.2 %    Basophil % 0.2 0.0 - 1.5 %    Immature Grans % 0.9 (H) 0.0 - 0.5 %    Neutrophils, Absolute 7.56 (H) 1.70 - 7.00 10*3/mm3    Lymphocytes, Absolute 1.03 0.70 - 3.10 10*3/mm3    Monocytes, Absolute 0.49 0.10 - 0.90 10*3/mm3    Eosinophils, Absolute 0.02 0.00 - 0.40 10*3/mm3    Basophils, Absolute 0.02 0.00 - 0.20 10*3/mm3    Immature Grans, Absolute 0.08 (H) 0.00 - 0.05 10*3/mm3    nRBC 0.0 0.0 - 0.2 /100 WBC   ECG 12 Lead Dyspnea   Result Value Ref Range    QT Interval 338 ms    QTC Interval 473 ms   Green Top (Gel)    Result Value Ref Range    Extra Tube Hold for add-ons.    Lavender Top   Result Value Ref Range    Extra Tube hold for add-on    Gold Top - SST   Result Value Ref Range    Extra Tube Hold for add-ons.    Light Blue Top   Result Value Ref Range    Extra Tube Hold for add-ons.      XR Chest 2 View   Final Result   No change in the airspace opacities intermixed with coarse   reticular interstitial opacities in the bilateral lower lobes   likely representing chronic interstitial lung disease/pulmonary   fibrosis.   Cardiomegaly. Small bilateral pleural effusions are unchanged.      Electronically signed by:  Christiane Ty  3/18/2023 8:00 PM CDT   Workstation: [id number]      CT Angiogram Chest   Final Result   1.  Pulmonary fibrosis, extensively within the lung bases and   scattered in the periphery otherwise.   2.  Lack of enhancement of the pulmonary artery branches within   the areas of fibrosis of the lung bases (likely due to shunting   rather than thrombus), otherwise robust enhancement and no   pulmonary embolus in the lungs, including to the nonfibrotic   portions of the lung bases.    3.  New irregular 1.3 cm nodular opacity within the medial left   pulmonary apex. Not present on the study performed 3 months ago.    4.  Mild to moderate cardiomegaly, increased in severity compared   with the CT December 20, 2022. Reflux of contrast from the right   heart into the hepatic veins. Suggestive of right heart   dysfunction.   5.  Anasarca suspected. New small bilateral pleural effusions.   New body wall edema.      Electronically signed by:  Risa Suárez MD  3/18/2023 7:50 PM   CDT Workstation: [id number]                  Medical Decision Making  Vital signs are stable, afebrile.  CTA was obtained which was poor timing for PE study, but no obvious PE noted.  New lung nodule noted.  Labs obtained significant for a creatinine of 2 which is up from 1.1 couple weeks ago.  Patient states her mouth feels like cotton  mele.  IVF bolus given.  Patient satting well on her home oxygen.  Discussed with the on-call hospitalist agrees to admit.    ROCCO (acute kidney injury) (HCC): acute illness or injury  SOB (shortness of breath): acute illness or injury  Amount and/or Complexity of Data Reviewed  Labs: ordered.  Radiology: ordered.  ECG/medicine tests: ordered and independent interpretation performed.      Risk  Prescription drug management.  Decision regarding hospitalization.          Final diagnoses:   ROCCO (acute kidney injury) (HCC)   SOB (shortness of breath)       ED Disposition  ED Disposition     ED Disposition   Decision to Admit    Condition   --    Comment   Level of Care: Telemetry [5]   Diagnosis: ROCCO (acute kidney injury) (HCC) [405480]   Admitting Physician: EUSEBIA RAJPUT [[id number]]   Attending Physician: EUSEBIA RAJPUT [[id number]]               No follow-up provider specified.       Medication List      No changes were made to your prescriptions during this visit.          Byron James MD  03/18/23 4460

## 2023-03-19 NOTE — NURSING NOTE
This RN spoke to Dr Gonzales with new EKG results. Will hold Heparin gtt and Amio gtt at this time. Order for Bolus given

## 2023-03-19 NOTE — ED NOTES
Nursing report ED to floor  Dana Ruiz  77 y.o.  female    HPI:   Chief Complaint   Patient presents with    Chest Pain    Shortness of Breath       Admitting doctor:   Madonna Pritchett MD    Consulting provider(s):  Consults       No orders found from 2/17/2023 to 3/19/2023.             Admitting diagnosis:   The primary encounter diagnosis was ROCCO (acute kidney injury) (HCC). A diagnosis of SOB (shortness of breath) was also pertinent to this visit.    Code status:   Current Code Status       Date Active Code Status Order ID Comments User Context       Prior            Allergies:   Polycillin [ampicillin]    Intake and Output  No intake or output data in the 24 hours ending 03/18/23 2043    Weight:       03/18/23 1750   Weight: 43.1 kg (95 lb)       Most recent vitals:   Vitals:    03/18/23 1820 03/18/23 1917 03/18/23 1918 03/18/23 2019   BP: 125/67 119/67 119/67 108/72   BP Location:  Left arm  Right arm   Patient Position:  Lying  Lying   Pulse: 110 115 114    Resp:  18     Temp:       TempSrc:       SpO2:  98% 100% 100%   Weight:       Height:         Oxygen Therapy: 3L via NC    Active LDAs/IV Access:   Lines, Drains & Airways       Active LDAs       Name Placement date Placement time Site Days    Peripheral IV 03/18/23 2040 Anterior;Right;Upper Arm 03/18/23 2040  Arm  less than 1                    Labs (abnormal labs have a star):   Labs Reviewed   COMPREHENSIVE METABOLIC PANEL - Abnormal; Notable for the following components:       Result Value    Glucose 384 (*)     BUN 53 (*)     Creatinine 1.97 (*)     Sodium 131 (*)     Potassium 5.3 (*)     Chloride 90 (*)     CO2 18.0 (*)     ALT (SGPT) 98 (*)     AST (SGOT) 92 (*)     Alkaline Phosphatase 233 (*)     BUN/Creatinine Ratio 26.9 (*)     Anion Gap 23.0 (*)     eGFR 25.6 (*)     All other components within normal limits    Narrative:     GFR Normal >60  Chronic Kidney Disease <60  Kidney Failure <15    The GFR formula is only valid for adults with  stable renal function between ages 18 and 70.   BNP (IN-HOUSE) - Abnormal; Notable for the following components:    proBNP 10,605.0 (*)     All other components within normal limits    Narrative:     Among patients with dyspnea, NT-proBNP is highly sensitive for the detection of acute congestive heart failure. In addition NT-proBNP of <300 pg/ml effectively rules out acute congestive heart failure with 99% negative predictive value.     SINGLE HSTROPONIN T - Abnormal; Notable for the following components:    HS Troponin T 37 (*)     All other components within normal limits    Narrative:     High Sensitive Troponin T Reference Range:  <10.0 ng/L- Negative Female for AMI  <15.0 ng/L- Negative Male for AMI  >=10 - Abnormal Female indicating possible myocardial injury.  >=15 - Abnormal Male indicating possible myocardial injury.   Clinicians would have to utilize clinical acumen, EKG, Troponin, and serial changes to determine if it is an Acute Myocardial Infarction or myocardial injury due to an underlying chronic condition.        CBC WITH AUTO DIFFERENTIAL - Abnormal; Notable for the following components:    MCHC 31.3 (*)     RDW 16.3 (*)     Neutrophil % 82.2 (*)     Lymphocyte % 11.2 (*)     Eosinophil % 0.2 (*)     Immature Grans % 0.9 (*)     Neutrophils, Absolute 7.56 (*)     Immature Grans, Absolute 0.08 (*)     All other components within normal limits   RAINBOW DRAW    Narrative:     The following orders were created for panel order [place] Draw.  Procedure                               Abnormality         Status                     ---------                               -----------         ------                     Green Top (Gel)[[id number]]                                  Final result               Lavender Top[[id number]]                                     Final result               Gold Top - SST[[id number]]                                   Final result               Light Blue Top[[id number]]                                    Final result                 Please view results for these tests on the individual orders.   CBC AND DIFFERENTIAL    Narrative:     The following orders were created for panel order CBC & Differential.  Procedure                               Abnormality         Status                     ---------                               -----------         ------                     CBC Auto Differential[[id number]]        Abnormal            Final result                 Please view results for these tests on the individual orders.   GREEN TOP   LAVENDER TOP   GOLD TOP - SST   LIGHT BLUE TOP       Meds given in ED:   Medications   sodium chloride 0.9 % flush 10 mL (has no administration in time range)   sodium chloride 0.9 % bolus 1,000 mL (1,000 mL Intravenous New Bag 3/18/23 2029)   iopamidol (ISOVUE-370) 76 % injection 100 mL (64 mL Intravenous Given 3/18/23 1813)   ondansetron (ZOFRAN) injection 4 mg (4 mg Intravenous Given 3/18/23 2030)   morphine injection 4 mg (4 mg Intravenous Given 3/18/23 2030)           NIH Stroke Scale:       Isolation/Infection(s):  No active isolations   No active infections     COVID Testing  Collected none   Resulted na    Nursing report ED to floor:  Mental status: CAOx4  Ambulatory status: full assit  Precautions: none    ED nurse phone [phone]- [phone]

## 2023-03-19 NOTE — PROGRESS NOTES
[facility] Medicine   INPATIENT PROGRESS NOTE      Patient Name: Dana Ruiz  Date of Admission: 3/18/2023  Today's Date: 03/19/23  Length of Stay: 0  Primary Care Physician: Madalyn Santiago APRN    Subjective   Patient states she is feeling a little better.  She continues to have SOB and significant nausea.  She is only able to tolerate ginger ale at this time.  She is also having some chest tightness.  She denies any fevers, chills or worsening cough.  No other complaints.  No overnight events.      Review of Systems   All pertinent negatives and positives are as above. All other systems have been reviewed and are negative unless otherwise stated.     Objective    Temp:  [97.3 °F (36.3 °C)-98.2 °F (36.8 °C)] 97.5 °F (36.4 °C)  Heart Rate:  [107-120] 120  Resp:  [18-20] 20  BP: (108-132)/(58-83) 123/74  Physical Exam  General: NAD  HEENT: AT NC, EOMI  Neck: No JVD  CVS: S1/S2 present, tachycardia, no M/R/G  Lungs: bibasilar crackles  Abdomen: soft, NT, ND, BS+  Ext: bilateral lower extremity pitting edema 1+  Skin: no rashes, bruises or discolorations  Neuro: no focal deficits  Psych: Not agitated         Results Review:  I have reviewed the labs, radiology results, and diagnostic studies.    Laboratory Data:   Results from last 7 days   Lab Units 03/19/23  0833 03/18/23  1832   WBC 10*3/mm3 14.03* 9.20   HEMOGLOBIN g/dL 14.2 13.3   HEMATOCRIT % 45.9 42.5   PLATELETS 10*3/mm3 334 298        Results from last 7 days   Lab Units 03/19/23  0833 03/18/23  1832   SODIUM mmol/L 136 131*   POTASSIUM mmol/L 5.3* 5.3*   CHLORIDE mmol/L 91* 90*   CO2 mmol/L 19.0* 18.0*   BUN mg/dL 63* 53*   CREATININE mg/dL 2.28* 1.97*   CALCIUM mg/dL 9.3 9.2   BILIRUBIN mg/dL 1.6* 1.2   ALK PHOS U/L 233* 233*   ALT (SGPT) U/L 412* 98*   AST (SGOT) U/L 631* 92*   GLUCOSE mg/dL 296* 384*       Culture Data:   No results found for: BLOODCX  No results found for: URINECX  No results  found for: RESPCX  No results found for: WOUNDCX  No results found for: STOOLCX  No components found for: BODYFLD    Radiology Data:   Imaging Results (Last 24 Hours)     Procedure Component Value Units Date/Time    US Abdomen Complete [[id number]] Resulted: 03/19/23 0921     Updated: 03/19/23 0921    CT Angiogram Chest [[id number]] Collected: 03/18/23 1808     Updated: 03/18/23 2311    Addenda:         ADDENDUM   ADDENDUM #1       ADDENDUM: Regarding the new irregular 1.3 cm opacity, for  low-risk or high-risk patients consider a follow-up chest CT at 3  months.  If unchanged consider an additional follow-up CT at  18-24 months.  Alternatively (or additionally) PET/CT or tissue  sampling could be performed.    Electronically signed by:  Risa Suárez MD  3/18/2023 11:09 PM  CDT Workstation: [id number]    Signed: 03/18/23 2309 by Risa Suárez MD    Narrative:      EXAM:  CT Angiography Chest With Intravenous Contrast    CLINICAL HISTORY:  sharp chest pain    TECHNIQUE:  Axial computed tomographic angiography images of the  chest with intravenous contrast.  Sagittal and coronal  reformatted images were created and reviewed.  This CT exam was  performed using one or more of the following dose reduction  techniques:  automated exposure control, adjustment of the mA  and/or kV according to patient size, and/or use of iterative  reconstruction technique.  MIP reconstructed images were created  and reviewed.    COMPARISON:  December 20, 2022    FINDINGS:    Lungs:    *   Extensive honeycombing of the lung bases with associated mild  bronchiectasis, and scattered areas of honeycombing elsewhere in  the upper lungs.  *  New irregular soft tissue nodular opacity within the medial  left pulmonary apex. Central branching air, likely bronchiectasis  and less likely cavitation. Measuring 1.1 cm x 1.3 cm x 1.5 cm  (axial image 42 of series 4, coronal image 38 of series 6).     Pulmonary arteries:  Lack of enhancement  of the pulmonary  artery branches within the areas of fibrosis of the lung bases,  otherwise robust enhancement and no pulmonary embolus in the  lungs, including to the nonfibrotic portions of the lung bases.    Aorta:  No acute findings.  No thoracic aortic aneurysm.    Pleural space:  New small bilateral pleural effusions.  No  pneumothorax.    Heart:  Mild to moderate cardiomegaly, increased in severity  compared with the CT December 20, 2022. Reflux of contrast from  the right heart into the hepatic veins.  No pericardial effusion.    Bones/joints:  No acute fracture.  No dislocation.    Soft tissues:  Generalized edema of the body wall, not present  previously.    Lymph nodes:  No lymphadenopathy.    Spleen:  Calcified granulomas in the spleen.      Impression:      1.  Pulmonary fibrosis, extensively within the lung bases and  scattered in the periphery otherwise.  2.  Lack of enhancement of the pulmonary artery branches within  the areas of fibrosis of the lung bases (likely due to shunting  rather than thrombus), otherwise robust enhancement and no  pulmonary embolus in the lungs, including to the nonfibrotic  portions of the lung bases.   3.  New irregular 1.3 cm nodular opacity within the medial left  pulmonary apex. Not present on the study performed 3 months ago.   4.  Mild to moderate cardiomegaly, increased in severity compared  with the CT December 20, 2022. Reflux of contrast from the right  heart into the hepatic veins. Suggestive of right heart  dysfunction.  5.  Anasarca suspected. New small bilateral pleural effusions.  New body wall edema.    Electronically signed by:  Risa Suárez MD  3/18/2023 7:50 PM  CDT Workstation: [id number]    XR Chest 2 View [[id number]] Collected: 03/18/23 1846     Updated: 03/18/23 2002    Narrative:      XR CHEST 2 VIEWS    INDICATION: 77 years Female; CHF/COPD Protocol  CHF/COPD Protocol    TECHNIQUE: 2 views of the chest were performed.    Comparison:  3/17/2023.    FINDINGS:  Lungs/Pleura: No change in the airspace opacities intermixed with  coarse reticular interstitial opacities in the bilateral lower  lobes. Small bilateral pleural effusions are unchanged.   Heart/Mediastinum: Cardiomegaly.   Bones: Unremarkable.  Soft tissues: Unremarkable.  Lines/Tubes: None.  Incidental findings: None.      Impression:      No change in the airspace opacities intermixed with coarse  reticular interstitial opacities in the bilateral lower lobes  likely representing chronic interstitial lung disease/pulmonary  fibrosis.  Cardiomegaly. Small bilateral pleural effusions are unchanged.    Electronically signed by:  Christiane Ty  3/18/2023 8:00 PM CDT  Workstation: [id number]          I have reviewed the patient's current medications.     Assessment/Plan       1) ROCCO on CKD  - worsening  - start IV fluids  - monitor with labs    2) hyperkalemia  - lokelma  - monitor    3) hypermagnesemia  - IV fluids  - monitor    4) acute on chronic respiratory failure with hypoxia  - unclear etiology  - cultures pending  - steroids, duonebs  - wean O2 as tolerated (baseline is PRN O2)    5) transaminitis  - suspect this is secondary to hypoperfusion  - IV fluids  - monitor with daily CMP    6) positive SIRS  - patient is meeting 3/4 SIRS criteria, but there is lower suspicion for infection at this time  - will hold empiric abx    7) HTN  - home meds  - lisinopril on hold for ROCCO    8) DM  - ISS, accuchecks, diet    9) GI/DVT ppx  - pepcid/lovenox        Electronically signed by Saundra Gonzales MD, 03/19/23, 10:22 CDT.

## 2023-03-19 NOTE — H&P
[facility] Medicine Admission      Date of Admission: 3/18/2023      Primary Care Physician: Madalyn Santiago APRN      Chief Complaint: Shortness of breath    HPI:    77-year-old female with past medical history significant for asthma, congestive heart failure, diabetes mellitus, dyslipidemia, hypertension who presented to the hospital with complaints of shortness of breath.  Patient reports that her symptoms started in the evening today.  It was gradual in onset, progressive.  She also had associated chest pressure.  She denied having any other symptoms including any nausea, vomiting, sweating, palpitations, lightheadedness, dizziness.    On arrival to the ER, EKG was unremarkable for any acute ST-T changes.  CT angiogram of the chest showed pulmonary fibrosis but no evidence of acute pulmonary embolism.  She was subsequently found to have elevated creatinine from her baseline, that she was initiated on IV fluids and is admitted to the hospital service for further evaluation management.          Concurrent Medical History:  has a past medical history of Asthma, Basal cell carcinoma of left lower eyelid, Blepharitis, Congestive heart failure (HCC), Diabetes mellitus (HCC), Hypercholesterolemia, Hypertension, and Nuclear senile cataract.    Past Surgical History:  has a past surgical history that includes  section (1978); Cystoscopy (1969); Combined hysteroscopy diagnostic / D&C (2005); and Eyelid carcinoma excision (2003).    Family History: family history includes Cancer in an other family member; Diabetes in an other family member; Heart disease in an other family member; Hypertension in an other family member; Leukemia in an other family member; Lung cancer in an other family member; Uterine cancer in an other family member.       Social History:  reports that she has never smoked. She does not have any smokeless tobacco history on file.  She reports that she does not drink alcohol and does not use drugs.    Allergies:   Allergies   Allergen Reactions   • Polycillin [Ampicillin]        Medications:   Prior to Admission medications    Medication Sig Start Date End Date Taking? Authorizing Provider   acetaminophen (TYLENOL) 500 MG tablet Take 1 tablet by mouth Every 6 (Six) Hours As Needed for Mild Pain.   Yes ProviderAnia MD   aspirin 81 MG EC tablet Take 1 tablet by mouth.   Yes Emergency, Nurse DIAZ Smalls   Cholecalciferol 50 MCG (2000 UT) tablet Take 1 tablet by mouth.   Yes Emergency, Nurse DIAZ Smalls   empagliflozin (Jardiance) 10 MG tablet tablet Take 1 tablet by mouth Daily. 3/17/23  Yes Madalyn Santiago APRN   furosemide (LASIX) 40 MG tablet Take 1 tablet by mouth Daily. 9/28/22  Yes Madalyn Santiago APRN   ipratropium-albuterol (DUO-NEB) 0.5-2.5 mg/3 ml nebulizer Take 3 mL by nebulization Every 4 (Four) Hours As Needed for Wheezing or Shortness of Air. 2/6/23  Yes Asiya Gresham,    lisinopril (PRINIVIL,ZESTRIL) 5 MG tablet Take 1 tablet by mouth Daily. 9/28/22  Yes Madalyn Santiago APRN   metFORMIN (GLUCOPHAGE) 500 MG tablet Take 1 tablet by mouth Daily With Breakfast. 9/28/22  Yes Madalyn Santiago APRN   methylPREDNISolone (MEDROL) 4 MG dose pack Take as directed on package instructions. 3/17/23  Yes Madalyn Santiago APRN   metoprolol succinate XL (Toprol XL) 25 MG 24 hr tablet Take 1 tablet by mouth Daily. 10/31/22  Yes Madalyn Santiago APRN   Misc. Devices (Rollator Ultra-Light) misc Use daily as needed 1/12/22  Yes Madalyn Santiago APRN   trolamine salicylate (ASPERCREME) 10 % cream Apply 1 application topically to the appropriate area as directed As Needed for Muscle / Joint Pain.   Yes Ania Doherty MD       Review of Systems:  Review of Systems   Otherwise complete ROS is negative except as mentioned above.    Physical Exam:   Temp:  [97.3 °F (36.3 °C)] 97.3 °F (36.3 °C)  Heart Rate:  [110-118] 114  Resp:  [18-20] 18  BP:  (108-127)/(66-72) 108/72  Physical Exam  General: [Appears stated age, alert, oriented ×3, cooperative]  HEENT: [Normocephalic, atraumatic, EOMI, PERRLA, unremarkable external ear, moist mucous membranes, supple neck, no lymphadenopathy]  CVS: [RRR, S1, S2, no murmurs, normal peripheral pulses]  Respiratory: [CTA bilaterally, symmetrical expansion, no wheezing, no rales, no crackles]  Gastrointestinal: [Soft, nontender, nondistended, no organomegaly could be appreciated, normal bowel sounds]  Musculoskeletal: [Grossly normal, no tenderness, normal ROM]  Skin: [No rashes, no erythema, no lesions]  Extremities: [Normal inspection.  No edema, no cyanosis, no clubbing.  Normal capillary refill]  Neuro: [Alert, oriented ×3, grossly normal motor and sensory function]  Psychiatry: [No anxiety, no depression, nonsuicidal]        Results Reviewed:  I have personally reviewed current lab, radiology, and data and agree with results.  Lab Results (last 24 hours)     Procedure Component Value Units Date/Time    [facility] Draw [[id number]] Collected: 03/18/23 1832    Specimen: Blood Updated: 03/18/23 1945    Narrative:      The following orders were created for panel order [facility] Draw.  Procedure                               Abnormality         Status                     ---------                               -----------         ------                     Green Top (Gel)[[id number]]                                  Final result               Lavender Top[[id number]]                                     Final result               Gold Top - [facility][[id number]]                                   Final result               Light Blue Top[[id number]]                                   Final result                 Please view results for these tests on the individual orders.    Green Top (Gel) [[id number]] Collected: 03/18/23 1832    Specimen: Blood Updated: 03/18/23 1945     Extra Tube Hold for add-ons.     Comment: Auto resulted.       Gold Top  - [facility] [[id number]] Collected: 03/18/23 1832    Specimen: Blood Updated: 03/18/23 1945     Extra Tube Hold for add-ons.     Comment: Auto resulted.       Light Blue Top [[id number]] Collected: 03/18/23 1832    Specimen: Blood Updated: 03/18/23 1945     Extra Tube Hold for add-ons.     Comment: Auto resulted       Lavender Top [[id number]] Collected: 03/18/23 1832    Specimen: Blood Updated: 03/18/23 1945     Extra Tube hold for add-on     Comment: Auto resulted       Comprehensive Metabolic Panel [[id number]]  (Abnormal) Collected: 03/18/23 1832    Specimen: Blood Updated: 03/18/23 1902     Glucose 384 mg/dL      BUN 53 mg/dL      Creatinine 1.97 mg/dL      Sodium 131 mmol/L      Potassium 5.3 mmol/L      Chloride 90 mmol/L      CO2 18.0 mmol/L      Calcium 9.2 mg/dL      Total Protein 6.3 g/dL      Albumin 3.9 g/dL      ALT (SGPT) 98 U/L      AST (SGOT) 92 U/L      Alkaline Phosphatase 233 U/L      Total Bilirubin 1.2 mg/dL      Globulin 2.4 gm/dL      A/G Ratio 1.6 g/dL      BUN/Creatinine Ratio 26.9     Anion Gap 23.0 mmol/L      eGFR 25.6 mL/min/1.73     Narrative:      GFR Normal >60  Chronic Kidney Disease <60  Kidney Failure <15    The GFR formula is only valid for adults with stable renal function between ages 18 and 70.    Single High Sensitivity Troponin T [[id number]]  (Abnormal) Collected: 03/18/23 1832    Specimen: Blood Updated: 03/18/23 1902     HS Troponin T 37 ng/L     Narrative:      High Sensitive Troponin T Reference Range:  <10.0 ng/L- Negative Female for AMI  <15.0 ng/L- Negative Male for AMI  >=10 - Abnormal Female indicating possible myocardial injury.  >=15 - Abnormal Male indicating possible myocardial injury.   Clinicians would have to utilize clinical acumen, EKG, Troponin, and serial changes to determine if it is an Acute Myocardial Infarction or myocardial injury due to an underlying chronic condition.         BNP [[id number]]  (Abnormal) Collected: 03/18/23 1832    Specimen: Blood Updated:  03/18/23 1900     proBNP 10,605.0 pg/mL     Narrative:      Among patients with dyspnea, NT-proBNP is highly sensitive for the detection of acute congestive heart failure. In addition NT-proBNP of <300 pg/ml effectively rules out acute congestive heart failure with 99% negative predictive value.      CBC & Differential [[id number]]  (Abnormal) Collected: 03/18/23 1832    Specimen: Blood Updated: 03/18/23 1839    Narrative:      The following orders were created for panel order CBC & Differential.  Procedure                               Abnormality         Status                     ---------                               -----------         ------                     CBC Auto Differential[[id number]]        Abnormal            Final result                 Please view results for these tests on the individual orders.    CBC Auto Differential [[id number]]  (Abnormal) Collected: 03/18/23 1832    Specimen: Blood Updated: 03/18/23 1839     WBC 9.20 10*3/mm3      RBC 4.67 10*6/mm3      Hemoglobin 13.3 g/dL      Hematocrit 42.5 %      MCV 91.0 fL      MCH 28.5 pg      MCHC 31.3 g/dL      RDW 16.3 %      RDW-SD 52.6 fl      MPV 10.5 fL      Platelets 298 10*3/mm3      Neutrophil % 82.2 %      Lymphocyte % 11.2 %      Monocyte % 5.3 %      Eosinophil % 0.2 %      Basophil % 0.2 %      Immature Grans % 0.9 %      Neutrophils, Absolute 7.56 10*3/mm3      Lymphocytes, Absolute 1.03 10*3/mm3      Monocytes, Absolute 0.49 10*3/mm3      Eosinophils, Absolute 0.02 10*3/mm3      Basophils, Absolute 0.02 10*3/mm3      Immature Grans, Absolute 0.08 10*3/mm3      nRBC 0.0 /100 WBC         Imaging Results (Last 24 Hours)     Procedure Component Value Units Date/Time    XR Chest 2 View [[id number]] Collected: 03/18/23 1846     Updated: 03/18/23 2002    Narrative:      XR CHEST 2 VIEWS    INDICATION: 77 years Female; CHF/COPD Protocol  CHF/COPD Protocol    TECHNIQUE: 2 views of the chest were performed.    Comparison:  3/17/2023.    FINDINGS:  Lungs/Pleura: No change in the airspace opacities intermixed with  coarse reticular interstitial opacities in the bilateral lower  lobes. Small bilateral pleural effusions are unchanged.   Heart/Mediastinum: Cardiomegaly.   Bones: Unremarkable.  Soft tissues: Unremarkable.  Lines/Tubes: None.  Incidental findings: None.      Impression:      No change in the airspace opacities intermixed with coarse  reticular interstitial opacities in the bilateral lower lobes  likely representing chronic interstitial lung disease/pulmonary  fibrosis.  Cardiomegaly. Small bilateral pleural effusions are unchanged.    Electronically signed by:  Christiane Ty  3/18/2023 8:00 PM CDT  Workstation: [id number]    CT Angiogram Chest [[id number]] Collected: 03/18/23 1808     Updated: 03/18/23 1952    Narrative:      EXAM:  CT Angiography Chest With Intravenous Contrast    CLINICAL HISTORY:  sharp chest pain    TECHNIQUE:  Axial computed tomographic angiography images of the  chest with intravenous contrast.  Sagittal and coronal  reformatted images were created and reviewed.  This CT exam was  performed using one or more of the following dose reduction  techniques:  automated exposure control, adjustment of the mA  and/or kV according to patient size, and/or use of iterative  reconstruction technique.  MIP reconstructed images were created  and reviewed.    COMPARISON:  December 20, 2022    FINDINGS:    Lungs:    *   Extensive honeycombing of the lung bases with associated mild  bronchiectasis, and scattered areas of honeycombing elsewhere in  the upper lungs.  *  New irregular soft tissue nodular opacity within the medial  left pulmonary apex. Central branching air, likely bronchiectasis  and less likely cavitation. Measuring 1.1 cm x 1.3 cm x 1.5 cm  (axial image 42 of series 4, coronal image 38 of series 6).     Pulmonary arteries:  Lack of enhancement of the pulmonary  artery branches within the areas of fibrosis of  the lung bases,  otherwise robust enhancement and no pulmonary embolus in the  lungs, including to the nonfibrotic portions of the lung bases.    Aorta:  No acute findings.  No thoracic aortic aneurysm.    Pleural space:  New small bilateral pleural effusions.  No  pneumothorax.    Heart:  Mild to moderate cardiomegaly, increased in severity  compared with the CT December 20, 2022. Reflux of contrast from  the right heart into the hepatic veins.  No pericardial effusion.    Bones/joints:  No acute fracture.  No dislocation.    Soft tissues:  Generalized edema of the body wall, not present  previously.    Lymph nodes:  No lymphadenopathy.    Spleen:  Calcified granulomas in the spleen.      Impression:      1.  Pulmonary fibrosis, extensively within the lung bases and  scattered in the periphery otherwise.  2.  Lack of enhancement of the pulmonary artery branches within  the areas of fibrosis of the lung bases (likely due to shunting  rather than thrombus), otherwise robust enhancement and no  pulmonary embolus in the lungs, including to the nonfibrotic  portions of the lung bases.   3.  New irregular 1.3 cm nodular opacity within the medial left  pulmonary apex. Not present on the study performed 3 months ago.   4.  Mild to moderate cardiomegaly, increased in severity compared  with the CT December 20, 2022. Reflux of contrast from the right  heart into the hepatic veins. Suggestive of right heart  dysfunction.  5.  Anasarca suspected. New small bilateral pleural effusions.  New body wall edema.    Electronically signed by:  Risa Suárez MD  3/18/2023 7:50 PM  CDT Workstation: [id number]            Assessment:    Active Hospital Problems    Diagnosis    • **ROCCO (acute kidney injury) (HCC)              Plan:      77-year-old female with past medical history significant for asthma, congestive heart failure, diabetes mellitus, hyperlipidemia, hypertension who presented to the hospital with complaints of shortness  of breath and was subsequently found to have acute kidney injury.    Acute kidney injury on CKD:  Admit to the [facility] bed  Close vitals monitoring  S/p IV fluids in the ER  We will hold off on any further IV fluids given history of congestive heart failure  Hold nephrotoxic medication  Repeat creatinine in the morning    Dyspnea:  Resolved  CT angiogram of the chest negative    Elevated LFTs:  Right upper quadrant ultrasound    Diabetes mellitus:  Hold oral medication  Sliding scale insulin    Hypertension:  Hold Toprol-XL  Lisinopril on hold    Patient is DNR  Estimated length of stay less than 2 midnights  DVT prophylaxis SCDs      I discussed the patient's findings and my recommendations with: Patient    Madonna Pritchett MD

## 2023-03-19 NOTE — PLAN OF CARE
Goal Outcome Evaluation:  Plan of Care Reviewed With: patient        Progress: no change  Outcome Evaluation: new admit. v/s stable. pt had two episodes of n/v. prn medication given-effective. pt rested throughout the night.

## 2023-03-19 NOTE — SIGNIFICANT NOTE
03/19/23 1010   OTHER   Discipline physical therapist   Rehab Time/Intention   Session Not Performed patient/family declined evaluation   Recommendation   PT - Next Appointment 03/20/23     Initial PT evaluation attempted, patient refuses participation, reports nausea/vomiting and feeling too poorly to rise from bed. Educated patient on concern for secondary complications by remaining in bed.

## 2023-03-20 NOTE — NURSING NOTE
"Dr. Sales at bedside, patient and daughters Keara and Radha present. Family decided to go comfort care. Notified Taryn with [facility] for potential referral, [facility] states \"if they are not on a ventilator, call with time of passing for potential tissue and cornea referral.\" Will continue to monitor.  "

## 2023-03-20 NOTE — NURSING NOTE
This nurse introduced self to patient and patient's family. Patient is comfort care- nonresponsive. Offered to turn patient and daughter's declined. Checked patient for UOP or BM- patient had neither. Gave call light to daughters- told to call with any needs.

## 2023-03-20 NOTE — PLAN OF CARE
Goal Outcome Evaluation:   Patient and family decided to be comfort measures only, Dr. CLAUDE Sales at bedside with patient and daughters during discussion. Ginna RN at bedside as well. Patient labored breathing and purple discoloration to fingers. PRN medication given. [facility] notified via telephone. Will continue to monitor patient and report changes. Nayla

## 2023-03-20 NOTE — PROGRESS NOTES
Nutrition Services    Patient Name:  Dana Ruiz  YOB: 1945  MRN: [id number]  Admit Date:  3/18/2023    Nutrition Assess,ment: Pt visited due to BMI of 18.0.  Nutrition screening showed no indicators.  Pt has been transitioned to COMFORT CARE.  RD will be available if needed.     Electronically signed by:  Keely Whitten RD  03/20/23 09:28 CDT

## 2023-03-20 NOTE — PROGRESS NOTES
Called about lactic acid level greater than 5.  Reviewed chart and noted patient suffered from ROCCO and respiratory distress.  Also noted procalcitonin less than 0.4.  Hopefully lactic acidosis secondary to volume depletion.  Will increase maintenance IV fluids from 75 to 125 cc/h.  Noted 3/2023 echocardiogram with EF 46 to 50%.  New echocardiogram ordered.  We will follow serial lactic acids, and if lactic acids continue to increase low threshold to initiate tissue ischemia work-up including surgery consultation overnight.  If patient shows definitive signs of sepsis, low threshold to start IV antibiotics overnight.    Addendum 3/20/2023 12:21 AM:  Patient's lactic acid continues to climb despite IV hydration.  Now concerned about possible sepsis.  We will therefore empirically start vancomycin/cefepime.  Continue to trend lactic acids.  Patient denies abdominal discomfort or muscle discomfort so doubt patient suffering from tissue ischemia.  Still believe that lactic acid related to infection versus dehydration.  Will start bicarb drip overnight but will return to initial IV fluid rate of 75 cc/h.  Repeat blood cultures given worsening lactic acid level.  Reviewed portable chest x-ray without overt signs of pulmonary vascular congestion.  However, since patient takes Lasix at home and has suffered from increased oxygen requirement, will administer Lasix 40 mg IV x1 overnight.        Lab 03/19/23  2344 03/19/23  2111   LACTATE 6.9* 5.6*       Addendum 3/20/2023 at 3:57 AM    Just spent an additional 15 minutes at bedside with patient and patient's daughters.  Patient's lactic acid level continues to climb despite bicarb drip, IV antibiotics.  Offered family  CT chest/abdomen/pelvis looking for signs of tissue ischemia, but family declined.  Family now amendable to no further escalation of care, and initiation of comfort care measures.  Patient continues to become more acidotic with multiorgan failure including  transaminitis, hypotension, renal insufficiency, confusion, and tachycardia.  Expect that patient most likely will  within next 24 hours unless rapid clinical improvement occurs.  Family confirmed DNR/DNI status at bedside during most recent conversation with Dr. Salse.          Lab 23  0301 23  2344 23  2111   LACTATE 9.3* 6.9* 5.6*

## 2023-03-20 NOTE — DISCHARGE SUMMARY
[facility] Medicine Services  DISCHARGE SUMMARY       Date of Admission: 3/18/2023  Date of Discharge:  3/20/2023  Primary Care Physician: Madalyn Santiago APRN    Presenting Problem/History of Present Illness:  SOB (shortness of breath) [R06.02]  ROCCO (acute kidney injury) (HCC) [N17.9]       Final Discharge Diagnoses:  Active Hospital Problems    Diagnosis    • **ROCCO (acute kidney injury) (HCC)        Consults:   Consults     No orders found from 2/17/2023 to 3/19/2023.           Procedures Performed:                 Pertinent Test Results:   Lab Results (most recent)     Procedure Component Value Units Date/Time    Comprehensive Metabolic Panel [[id number]]  (Abnormal) Collected: 03/20/23 0301    Specimen: Blood Updated: 03/20/23 0330     Glucose 210 mg/dL      BUN 60 mg/dL      Creatinine 2.21 mg/dL      Sodium 136 mmol/L      Potassium 5.0 mmol/L      Chloride 95 mmol/L      CO2 15.0 mmol/L      Calcium 7.4 mg/dL      Total Protein 5.2 g/dL      Albumin 3.3 g/dL      ALT (SGPT) 543 U/L      AST (SGOT) 852 U/L      Alkaline Phosphatase 170 U/L      Total Bilirubin 2.3 mg/dL      Globulin 1.9 gm/dL      A/G Ratio 1.7 g/dL      BUN/Creatinine Ratio 27.1     Anion Gap 26.0 mmol/L      eGFR 22.3 mL/min/1.73     Narrative:      GFR Normal >60  Chronic Kidney Disease <60  Kidney Failure <15    The GFR formula is only valid for adults with stable renal function between ages 18 and 70.    STAT Lactic Acid, Reflex [[id number]]  (Abnormal) Collected: 03/20/23 0301    Specimen: Blood Updated: 03/20/23 0318     Lactate 9.3 mmol/L     CBC & Differential [[id number]]  (Abnormal) Collected: 03/20/23 0301    Specimen: Blood Updated: 03/20/23 0307    Narrative:      The following orders were created for panel order CBC & Differential.  Procedure                               Abnormality         Status                     ---------                               -----------          ------                     CBC Auto Differential[[id number]]        Abnormal            Final result                 Please view results for these tests on the individual orders.    CBC Auto Differential [[id number]]  (Abnormal) Collected: 03/20/23 0301    Specimen: Blood Updated: 03/20/23 0307     WBC 16.38 10*3/mm3      RBC 4.39 10*6/mm3      Hemoglobin 12.7 g/dL      Hematocrit 40.0 %      MCV 91.1 fL      MCH 28.9 pg      MCHC 31.8 g/dL      RDW 16.6 %      RDW-SD 52.4 fl      MPV 10.6 fL      Platelets 276 10*3/mm3      Neutrophil % 86.2 %      Lymphocyte % 5.9 %      Monocyte % 6.8 %      Eosinophil % 0.1 %      Basophil % 0.2 %      Immature Grans % 0.8 %      Neutrophils, Absolute 14.13 10*3/mm3      Lymphocytes, Absolute 0.96 10*3/mm3      Monocytes, Absolute 1.11 10*3/mm3      Eosinophils, Absolute 0.01 10*3/mm3      Basophils, Absolute 0.04 10*3/mm3      Immature Grans, Absolute 0.13 10*3/mm3      nRBC 0.4 /100 WBC     S. Pneumo Ag Urine or CSF - Urine, Urine, Clean Catch [[id number]]  (Normal) Collected: 03/20/23 0001    Specimen: Urine, Clean Catch Updated: 03/20/23 0050     Strep Pneumo Ag Negative    Legionella Antigen, Urine - Urine, Urine, Clean Catch [[id number]]  (Normal) Collected: 03/20/23 0001    Specimen: Urine, Clean Catch Updated: 03/20/23 0050     LEGIONELLA ANTIGEN, URINE Negative    Extra Tubes [[id number]] Collected: 03/19/23 2344    Specimen: Blood, Venous Line Updated: 03/20/23 0045    Narrative:      The following orders were created for panel order Extra Tubes.  Procedure                               Abnormality         Status                     ---------                               -----------         ------                     Lavender Top[[id number]]                                     Final result               Green Top (Gel)[[id number]]                                  Final result                 Please view results for these tests on the individual orders.    Lavender Top  [[id number]] Collected: 03/19/23 2344    Specimen: Blood Updated: 03/20/23 0045     Extra Tube hold for add-on     Comment: Auto resulted       Green Top (Gel) [[id number]] Collected: 03/19/23 2344    Specimen: Blood Updated: 03/20/23 0045     Extra Tube Hold for add-ons.     Comment: Auto resulted.       STAT Lactic Acid, Reflex [[id number]]  (Abnormal) Collected: 03/19/23 2344    Specimen: Blood Updated: 03/20/23 0005     Lactate 6.9 mmol/L     Blood Gas, Arterial - [[id number]]  (Abnormal) Collected: 03/19/23 2350    Specimen: Arterial Blood Updated: 03/19/23 2352     Site Right Brachial     Paul's Test N/A     pH, Arterial 7.256 pH units      Comment: 84 Value below reference range        pCO2, Arterial 33.5 mm Hg      Comment: 84 Value below reference range        pO2, Arterial 160.0 mm Hg      Comment: 83 Value above reference range        HCO3, Arterial 14.9 mmol/L      Comment: 84 Value below reference range        Base Excess, Arterial -11.2 mmol/L      Comment: 84 Value below reference range        O2 Saturation, Arterial 99.5 %      Comment: 83 Value above reference range        Barometric Pressure for Blood Gas 756 mmHg      Modality Nasal Cup     Flow Rate 52.0 lpm      Ventilator Mode NA     Collected by RT     Comment: Meter: [id number]     :  [id number]       Lactic Acid, Plasma [[id number]]  (Abnormal) Collected: 03/19/23 2111    Specimen: Blood Updated: 03/19/23 2142     Lactate 5.6 mmol/L     Procalcitonin [[id number]]  (Abnormal) Collected: 03/19/23 1819    Specimen: Blood Updated: 03/19/23 2133     Procalcitonin 0.38 ng/mL     Narrative:      As a Marker for Sepsis (Non-Neonates):    1. <0.5 ng/mL represents a low risk of severe sepsis and/or septic shock.  2. >2 ng/mL represents a high risk of severe sepsis and/or septic shock.    As a Marker for Lower Respiratory Tract Infections that require antibiotic therapy:    PCT on Admission    Antibiotic Therapy       6-12 Hrs later    >0.5       "          Strongly Recommended  >0.25 - <0.5        Recommended   0.1 - 0.25          Discouraged              Remeasure/reassess PCT  <0.1                Strongly Discouraged     Remeasure/reassess PCT    As 28 day mortality risk marker: \"Change in Procalcitonin Result\" (>80% or <=80%) if Day 0 (or Day 1) and Day 4 values are available. Refer to [email or web address]    Change in PCT <=80%  A decrease of PCT levels below or equal to 80% defines a positive change in PCT test result representing a higher risk for 28-day all-cause mortality of patients diagnosed with severe sepsis for septic shock.    Change in PCT >80%  A decrease of PCT levels of more than 80% defines a negative change in PCT result representing a lower risk for 28-day all-cause mortality of patients diagnosed with severe sepsis or septic shock.       Hepatic Function Panel [[id number]]  (Abnormal) Collected: 03/19/23 1819    Specimen: Blood Updated: 03/19/23 2115     Total Protein 6.5 g/dL      Albumin 3.9 g/dL      ALT (SGPT) 636 U/L      AST (SGOT) 1,076 U/L      Alkaline Phosphatase 211 U/L      Total Bilirubin 1.5 mg/dL      Bilirubin, Direct 1.0 mg/dL      Bilirubin, Indirect 0.5 mg/dL     Basic Metabolic Panel [[id number]]  (Abnormal) Collected: 03/19/23 1819    Specimen: Blood Updated: 03/19/23 2015     Glucose 108 mg/dL      BUN 68 mg/dL      Creatinine 2.12 mg/dL      Sodium 135 mmol/L      Potassium 4.9 mmol/L      Chloride 93 mmol/L      CO2 17.0 mmol/L      Calcium 8.8 mg/dL      BUN/Creatinine Ratio 32.1     Anion Gap 25.0 mmol/L      eGFR 23.5 mL/min/1.73     Narrative:      GFR Normal >60  Chronic Kidney Disease <60  Kidney Failure <15    The GFR formula is only valid for adults with stable renal function between ages 18 and 70.    Phosphorus [[id number]]  (Abnormal) Collected: 03/19/23 1819    Specimen: Blood Updated: 03/19/23 2015     Phosphorus 7.2 mg/dL     Magnesium [[id number]]  (Abnormal) Collected: 03/19/23 1819    " Specimen: Blood Updated: 03/19/23 2015     Magnesium 2.6 mg/dL     Protime-INR [[id number]]  (Abnormal) Collected: 03/19/23 1859    Specimen: Blood Updated: 03/19/23 1928     Protime 25.8 Seconds      INR 2.34    Narrative:      Therapeutic range for most indications is 2.0-3.0 INR,  or 2.5-3.5 for mechanical heart valves.    aPTT [[id number]]  (Normal) Collected: 03/19/23 1859    Specimen: Blood Updated: 03/19/23 1928     PTT 37.0 seconds     Narrative:      The recommended Heparin therapeutic range is 68-97 seconds.    CBC & Differential [[id number]]  (Abnormal) Collected: 03/19/23 1859    Specimen: Blood Updated: 03/19/23 1905    Narrative:      The following orders were created for panel order CBC & Differential.  Procedure                               Abnormality         Status                     ---------                               -----------         ------                     CBC Auto Differential[[id number]]        Abnormal            Final result                 Please view results for these tests on the individual orders.    CBC Auto Differential [[id number]]  (Abnormal) Collected: 03/19/23 1859    Specimen: Blood Updated: 03/19/23 1905     WBC 13.08 10*3/mm3      RBC 4.45 10*6/mm3      Hemoglobin 13.0 g/dL      Hematocrit 41.3 %      MCV 92.8 fL      MCH 29.2 pg      MCHC 31.5 g/dL      RDW 16.5 %      RDW-SD 54.3 fl      MPV 10.8 fL      Platelets 278 10*3/mm3      Neutrophil % 86.2 %      Lymphocyte % 8.5 %      Monocyte % 3.8 %      Eosinophil % 0.2 %      Basophil % 0.1 %      Immature Grans % 1.2 %      Neutrophils, Absolute 11.27 10*3/mm3      Lymphocytes, Absolute 1.11 10*3/mm3      Monocytes, Absolute 0.50 10*3/mm3      Eosinophils, Absolute 0.03 10*3/mm3      Basophils, Absolute 0.01 10*3/mm3      Immature Grans, Absolute 0.16 10*3/mm3      nRBC 0.3 /100 WBC     High Sensitivity Troponin T [[id number]]  (Abnormal) Collected: 03/19/23 1819    Specimen: Blood Updated: 03/19/23 1842     HS Troponin  T 45 ng/L     Narrative:      High Sensitive Troponin T Reference Range:  <10.0 ng/L- Negative Female for AMI  <15.0 ng/L- Negative Male for AMI  >=10 - Abnormal Female indicating possible myocardial injury.  >=15 - Abnormal Male indicating possible myocardial injury.   Clinicians would have to utilize clinical acumen, EKG, Troponin, and serial changes to determine if it is an Acute Myocardial Infarction or myocardial injury due to an underlying chronic condition.         POC Glucose Once [[id number]]  (Normal) Collected: 03/19/23 1641    Specimen: Blood Updated: 03/19/23 1701     Glucose 112 mg/dL      Comment: RN NotifiedOperator: [id number] [facility] KRISTAMeter ID: [id number]       Extra Tubes [[id number]] Collected: 03/19/23 1257    Specimen: Blood, Venous Line Updated: 03/19/23 1401    Narrative:      The following orders were created for panel order Extra Tubes.  Procedure                               Abnormality         Status                     ---------                               -----------         ------                     Lavender Top[[id number]]                                     Final result                 Please view results for these tests on the individual orders.    Lavender Top [[id number]] Collected: 03/19/23 1257    Specimen: Blood Updated: 03/19/23 1401     Extra Tube hold for add-on     Comment: Auto resulted       Urinalysis With Microscopic - Urine, Clean Catch [[id number]]  (Abnormal) Collected: 03/19/23 1137    Specimen: Urine, Clean Catch Updated: 03/19/23 1348    Narrative:      The following orders were created for panel order Urinalysis With Microscopic - Urine, Clean Catch.  Procedure                               Abnormality         Status                     ---------                               -----------         ------                     Urinalysis without micro...[[id number]]  Abnormal            Final result               Urinalysis, Microscopic ...[[id number]]  Abnormal             Final result                 Please view results for these tests on the individual orders.    Urinalysis, Microscopic Only - Urine, Clean Catch [[id number]]  (Abnormal) Collected: 03/19/23 1137    Specimen: Urine, Clean Catch Updated: 03/19/23 1348     RBC, UA 0-2 /HPF      WBC, UA 0-2 /HPF      Bacteria, UA None Seen /HPF      Squamous Epithelial Cells, UA 0-2 /HPF      Hyaline Casts, UA 7-12 /LPF      Methodology Automated Microscopy    Urinalysis without microscopic (no culture) - Urine, Clean Catch [[id number]]  (Abnormal) Collected: 03/19/23 1137    Specimen: Urine, Clean Catch Updated: 03/19/23 1348     Color, UA Yellow     Appearance, UA Clear     pH, UA <=5.0     Specific Gravity, UA 1.032     Comment: Result obtained by Refractometer        Glucose, UA >=1000 mg/dL (3+)     Ketones, UA Negative     Bilirubin, UA Negative     Blood, UA Negative     Protein, UA Trace     Leuk Esterase, UA Negative     Nitrite, UA Negative     Urobilinogen, UA 1.0 E.U./dL    High Sensitivity Troponin T 2Hr [[id number]]  (Abnormal) Collected: 03/19/23 1257    Specimen: Blood Updated: 03/19/23 1322     HS Troponin T 40 ng/L      Troponin T Delta 0 ng/L     Narrative:      High Sensitive Troponin T Reference Range:  <10.0 ng/L- Negative Female for AMI  <15.0 ng/L- Negative Male for AMI  >=10 - Abnormal Female indicating possible myocardial injury.  >=15 - Abnormal Male indicating possible myocardial injury.   Clinicians would have to utilize clinical acumen, EKG, Troponin, and serial changes to determine if it is an Acute Myocardial Infarction or myocardial injury due to an underlying chronic condition.         Blood Culture - Blood, Arm, Left [[id number]] Collected: 03/19/23 1257    Specimen: Blood from Arm, Left Updated: 03/19/23 1306    High Sensitivity Troponin T [[id number]]  (Abnormal) Collected: 03/19/23 0833    Specimen: Blood Updated: 03/19/23 1128     HS Troponin T 40 ng/L     Narrative:      High Sensitive Troponin T  Reference Range:  <10.0 ng/L- Negative Female for AMI  <15.0 ng/L- Negative Male for AMI  >=10 - Abnormal Female indicating possible myocardial injury.  >=15 - Abnormal Male indicating possible myocardial injury.   Clinicians would have to utilize clinical acumen, EKG, Troponin, and serial changes to determine if it is an Acute Myocardial Infarction or myocardial injury due to an underlying chronic condition.         Blood Culture - Blood, Arm, Left [[id number]] Collected: 03/19/23 1034    Specimen: Blood from Arm, Left Updated: 03/19/23 1053    POC Glucose Once [[id number]]  (Abnormal) Collected: 03/19/23 1034    Specimen: Blood Updated: 03/19/23 1052     Glucose 223 mg/dL      Comment: RN NotifiedOperator: [id number] [facility] KRISTAMeter ID: [id number]       Comprehensive Metabolic Panel [[id number]]  (Abnormal) Collected: 03/19/23 0833    Specimen: Blood Updated: 03/19/23 0907     Glucose 296 mg/dL      BUN 63 mg/dL      Creatinine 2.28 mg/dL      Sodium 136 mmol/L      Potassium 5.3 mmol/L      Chloride 91 mmol/L      CO2 19.0 mmol/L      Calcium 9.3 mg/dL      Total Protein 6.5 g/dL      Albumin 3.9 g/dL      ALT (SGPT) 412 U/L      AST (SGOT) 631 U/L      Alkaline Phosphatase 233 U/L      Total Bilirubin 1.6 mg/dL      Globulin 2.6 gm/dL      A/G Ratio 1.5 g/dL      BUN/Creatinine Ratio 27.6     Anion Gap 26.0 mmol/L      eGFR 21.5 mL/min/1.73     Narrative:      GFR Normal >60  Chronic Kidney Disease <60  Kidney Failure <15    The GFR formula is only valid for adults with stable renal function between ages 18 and 70.    Phosphorus [[id number]]  (Abnormal) Collected: 03/19/23 0833    Specimen: Blood Updated: 03/19/23 0907     Phosphorus 8.8 mg/dL     Magnesium [[id number]]  (Abnormal) Collected: 03/19/23 0833    Specimen: Blood Updated: 03/19/23 0907     Magnesium 2.5 mg/dL     TSH [[id number]]  (Normal) Collected: 03/18/23 1832    Specimen: Blood Updated: 03/18/23 2144     TSH 2.450 uIU/mL     [place] Draw  [[id number]] Collected: 03/18/23 1832    Specimen: Blood Updated: 03/18/23 1945    Narrative:      The following orders were created for panel order [place] Draw.  Procedure                               Abnormality         Status                     ---------                               -----------         ------                     Green Top (Gel)[[id number]]                                  Final result               Lavender Top[[id number]]                                     Final result               Gold Top - SST[[id number]]                                   Final result               Light Blue Top[[id number]]                                   Final result                 Please view results for these tests on the individual orders.    Green Top (Gel) [[id number]] Collected: 03/18/23 1832    Specimen: Blood Updated: 03/18/23 1945     Extra Tube Hold for add-ons.     Comment: Auto resulted.       Gold Top - SST [[id number]] Collected: 03/18/23 1832    Specimen: Blood Updated: 03/18/23 1945     Extra Tube Hold for add-ons.     Comment: Auto resulted.       Light Blue Top [[id number]] Collected: 03/18/23 1832    Specimen: Blood Updated: 03/18/23 1945     Extra Tube Hold for add-ons.     Comment: Auto resulted       Single High Sensitivity Troponin T [[id number]]  (Abnormal) Collected: 03/18/23 1832    Specimen: Blood Updated: 03/18/23 1902     HS Troponin T 37 ng/L     Narrative:      High Sensitive Troponin T Reference Range:  <10.0 ng/L- Negative Female for AMI  <15.0 ng/L- Negative Male for AMI  >=10 - Abnormal Female indicating possible myocardial injury.  >=15 - Abnormal Male indicating possible myocardial injury.   Clinicians would have to utilize clinical acumen, EKG, Troponin, and serial changes to determine if it is an Acute Myocardial Infarction or myocardial injury due to an underlying chronic condition.         BNP [[id number]]  (Abnormal) Collected: 03/18/23 1832    Specimen: Blood Updated: 03/18/23 1900      proBNP 10,605.0 pg/mL     Narrative:      Among patients with dyspnea, NT-proBNP is highly sensitive for the detection of acute congestive heart failure. In addition NT-proBNP of <300 pg/ml effectively rules out acute congestive heart failure with 99% negative predictive value.          Imaging Results (Most Recent)     Procedure Component Value Units Date/Time    XR Chest 1 View [[id number]] Collected: 03/19/23 2325     Updated: 03/19/23 2350    Narrative:      EXAM:  XR Chest, 1 View    CLINICAL HISTORY:  SOB, N17.9 Acute kidney failure, unspecified  R06.02 Shortness of breath    TECHNIQUE:  Frontal view of the chest.    COMPARISON:  March 17, 2023    FINDINGS:    Lungs:  Interstitial coarsening of the lower lungs  corresponding with the pulmonary fibrosis on CT.    Pleural space:  Stable small pleural effusions.    Heart:  Stable cardiomegaly.    Mediastinum:  Normal contours.    Bones/joints: Unremarkable.      Impression:      1.  Stable small pleural effusions.  2.  Interstitial coarsening of the lower lungs corresponding with  the pulmonary fibrosis on CT.    Electronically signed by:  Risa Suárez MD  3/19/2023 11:48 PM  CDT Workstation: [id number]    XR Abdomen KUB [[id number]] Collected: 03/19/23 2013     Updated: 03/19/23 2108    Narrative:      EXAM DESCRIPTION:    XR ABDOMEN KUB    CLINICAL HISTORY:    77 years  Female  emesis, N17.9 Acute kidney failure, unspecified  R06.02 Shortness of breath    TECHNIQUE:    One view of the abdomen.    COMPARISON:    Prior day's CT scan of the chest.     FINDINGS:    Bilateral pleural effusions and airspace infiltrates throughout  the right lung base again noted. There does appear to be some  oral contrast in the small bowel. Excreted intravenous contrast  noted in the renal collecting systems and urinary bladder.    The bowel gas pattern is nonspecific and nondilated without  evidence of obstruction or free intraperitoneal air. No acute  osseous lesion.       Impression:        No acute findings in the abdomen.    Electronically signed by:  Iveth Myers MD  3/19/2023 9:06 PM CDT  Workstation: [id number]    US Abdomen Complete [[id number]] Collected: 03/19/23 0850     Updated: 03/19/23 1129    Narrative:        ULTRASOUND OF THE ABDOMEN    HISTORY: Elevated liver function tests. Acute kidney failure.  Shortness of breath.    Ultrasound examination of the abdomen was performed.    COMPARISON: None    FINDINGS:  The visualized liver is of normal echotexture.  No focal intrahepatic lesion.  Markedly thickened gallbladder wall.  This is a nonspecific finding and can be seen with acute and  chronic cholecystitis, hepatitis and proteinuria among other  entities.  No calculi or pericholecystic fluid.  Common bile duct is within normal limits at 2.3 mm.  Normal pancreas.  Increased cortical echogenicity of the kidneys compatible with  medical renal disease.  Generalized cortical thinning of each kidney.  Right kidney 8.96 cm in length by 4.01 cm x 3.76 cm transverse  with a volume of 70.71 mL.  Left kidney 9.08 cm in length by 4.88 cm x 4.20 cm transverse  with a volume of 97.36 mL.  Splenic granulomata.  Unremarkable IVC.  No abdominal aortic aneurysm.  No free fluid.      Impression:      CONCLUSION:  Normal ultrasound of the liver.  Markedly thickened gallbladder wall.  This is a nonspecific finding and can be seen with acute and  chronic cholecystitis, hepatitis and proteinuria among other  entities.  No calculi or pericholecystic fluid.  Increased cortical echogenicity of the kidneys compatible with  medical renal disease.  Generalized cortical thinning of each kidney.    [id number]    Electronically signed by:  Luis Bee MD  3/19/2023 11:27 AM  CDT Workstation: [id number]    CT Angiogram Chest [[id number]] Collected: 03/18/23 1806     Updated: 03/18/23 2318    Addenda:         ADDENDUM   ADDENDUM #1       ADDENDUM: Regarding the new irregular 1.3 cm opacity, for  low-risk or  high-risk patients consider a follow-up chest CT at 3  months.  If unchanged consider an additional follow-up CT at  18-24 months.  Alternatively (or additionally) PET/CT or tissue  sampling could be performed.    Electronically signed by:  Risa Suárez MD  3/18/2023 11:09 PM  CDT Workstation: [id number]    Signed: 03/18/23 2309 by Risa Suárez MD    Narrative:      EXAM:  CT Angiography Chest With Intravenous Contrast    CLINICAL HISTORY:  sharp chest pain    TECHNIQUE:  Axial computed tomographic angiography images of the  chest with intravenous contrast.  Sagittal and coronal  reformatted images were created and reviewed.  This CT exam was  performed using one or more of the following dose reduction  techniques:  automated exposure control, adjustment of the mA  and/or kV according to patient size, and/or use of iterative  reconstruction technique.  MIP reconstructed images were created  and reviewed.    COMPARISON:  December 20, 2022    FINDINGS:    Lungs:    *   Extensive honeycombing of the lung bases with associated mild  bronchiectasis, and scattered areas of honeycombing elsewhere in  the upper lungs.  *  New irregular soft tissue nodular opacity within the medial  left pulmonary apex. Central branching air, likely bronchiectasis  and less likely cavitation. Measuring 1.1 cm x 1.3 cm x 1.5 cm  (axial image 42 of series 4, coronal image 38 of series 6).     Pulmonary arteries:  Lack of enhancement of the pulmonary  artery branches within the areas of fibrosis of the lung bases,  otherwise robust enhancement and no pulmonary embolus in the  lungs, including to the nonfibrotic portions of the lung bases.    Aorta:  No acute findings.  No thoracic aortic aneurysm.    Pleural space:  New small bilateral pleural effusions.  No  pneumothorax.    Heart:  Mild to moderate cardiomegaly, increased in severity  compared with the CT December 20, 2022. Reflux of contrast from  the right heart into the  hepatic veins.  No pericardial effusion.    Bones/joints:  No acute fracture.  No dislocation.    Soft tissues:  Generalized edema of the body wall, not present  previously.    Lymph nodes:  No lymphadenopathy.    Spleen:  Calcified granulomas in the spleen.      Impression:      1.  Pulmonary fibrosis, extensively within the lung bases and  scattered in the periphery otherwise.  2.  Lack of enhancement of the pulmonary artery branches within  the areas of fibrosis of the lung bases (likely due to shunting  rather than thrombus), otherwise robust enhancement and no  pulmonary embolus in the lungs, including to the nonfibrotic  portions of the lung bases.   3.  New irregular 1.3 cm nodular opacity within the medial left  pulmonary apex. Not present on the study performed 3 months ago.   4.  Mild to moderate cardiomegaly, increased in severity compared  with the CT December 20, 2022. Reflux of contrast from the right  heart into the hepatic veins. Suggestive of right heart  dysfunction.  5.  Anasarca suspected. New small bilateral pleural effusions.  New body wall edema.    Electronically signed by:  Risa Suárez MD  3/18/2023 7:50 PM  CDT Workstation: [id number]    XR Chest 2 View [[id number]] Collected: 03/18/23 1846     Updated: 03/18/23 2002    Narrative:      XR CHEST 2 VIEWS    INDICATION: 77 years Female; CHF/COPD Protocol  CHF/COPD Protocol    TECHNIQUE: 2 views of the chest were performed.    Comparison: 3/17/2023.    FINDINGS:  Lungs/Pleura: No change in the airspace opacities intermixed with  coarse reticular interstitial opacities in the bilateral lower  lobes. Small bilateral pleural effusions are unchanged.   Heart/Mediastinum: Cardiomegaly.   Bones: Unremarkable.  Soft tissues: Unremarkable.  Lines/Tubes: None.  Incidental findings: None.      Impression:      No change in the airspace opacities intermixed with coarse  reticular interstitial opacities in the bilateral lower lobes  likely representing  chronic interstitial lung disease/pulmonary  fibrosis.  Cardiomegaly. Small bilateral pleural effusions are unchanged.    Electronically signed by:  Christiane Ty  3/18/2023 8:00 PM CDT  Workstation: [id number]          Chief Complaint on Day of Discharge: SOB, ROCCO    Hospital Course:  77-year-old female with past medical history significant for asthma, congestive heart failure, diabetes mellitus, dyslipidemia, hypertension who presented to the hospital with complaints of shortness of breath.  Patient reports that her symptoms started in the evening today.  It was gradual in onset, progressive.  She also had associated chest pressure.  She denied having any other symptoms including any nausea, vomiting, sweating, palpitations, lightheadedness, dizziness.     On arrival to the ER, EKG was unremarkable for any acute ST-T changes.  CT angiogram of the chest showed pulmonary fibrosis but no evidence of acute pulmonary embolism.  She was subsequently found to have elevated creatinine from her baseline, that she was initiated on IV fluids and is admitted to the hospital service for further evaluation management.    Patient was started on IV fluids and steroids while in patient.  She continued to decline.  Her heart rate started to increase and an EKG showed possible A-flutter, but a repeat showed sinus tachycardia.  She was moved to the ICU where she continued to decline despite abx and aggressive fluid management.  Her family moved to make her comfort care and she passed at 9:51 am on 3/20.       Condition on Discharge:      Discharge Disposition:       Discharge Medications:     Discharge Medications      Continue These Medications      Instructions Start Date   Rollator Ultra-Light misc   Use daily as needed         ASK your doctor about these medications      Instructions Start Date   acetaminophen 500 MG tablet  Commonly known as: TYLENOL   500 mg, Oral, Every 6 Hours PRN      aspirin 81 MG EC tablet   81  mg, Oral      Cholecalciferol 50 MCG (2000 UT) tablet   2,000 Units, Oral      empagliflozin 10 MG tablet tablet  Commonly known as: Jardiance   10 mg, Oral, Daily      furosemide 40 MG tablet  Commonly known as: LASIX   40 mg, Oral, Daily      ipratropium-albuterol 0.5-2.5 mg/3 ml nebulizer  Commonly known as: DUO-NEB   3 mL, Nebulization, Every 4 Hours PRN      lisinopril 5 MG tablet  Commonly known as: PRINIVIL,ZESTRIL   5 mg, Oral, Daily      metFORMIN 500 MG tablet  Commonly known as: GLUCOPHAGE   500 mg, Oral, Daily With Breakfast      methylPREDNISolone 4 MG dose pack  Commonly known as: MEDROL   Take as directed on package instructions.      metoprolol succinate XL 25 MG 24 hr tablet  Commonly known as: Toprol XL   25 mg, Oral, Daily      trolamine salicylate 10 % cream  Commonly known as: ASPERCREME   1 application, Topical, As Needed             Discharge Diet: none    Activity at Discharge: none    Discharge Care Plan/Instructions: none    Follow-up Appointments:   Future Appointments   Date Time Provider Department [place]   3/27/2023 11:30 AM [facility]   4/18/2023 10:00 AM Loyd See MD [facility] None   6/8/2023 10:30 AM Madalyn Santiago APRN [facility] [address]   8/25/2023 10:00 AM Loyd See MD [facility] None         Time: less than 30 minutes

## 2023-03-20 NOTE — PROGRESS NOTES
[facility] Nurse Practitioner - Brief Progress Note  PERMANENT  03/19/2023 20:09    [facility] - CCU/SD - 20 - LYNDON BORGES ([facility])    KIRILL RODRIGUEZ    Date of Service 03/19/2023 20:09    HPI/Events of Note [facility] Provider Assessment Note    77 year old female admitted to theICU for Acute kidney injury on CKD, Transaminitis, esmsis, leukocytosis, tachycardia, shortness of breath requirng 3L NC (baselien 2L NC per family)    Pmhx: congestive heart failure, diabetes mellitus, dyslipidemia, hypertension, 2L NC at baseline, asthma    VSS , /66, SATs 98%  on 3L    CT angiogram of the chest showed pulmonary fibrosis but no evidence of acute pulmonary embolism.     Acute kidney injury on CKD  -Cr 1.97-- > 2.28  -K 5.3? 5.3, follow up now  -Monitor urine output , electrolytes and creatinine trend  -Adjust meds as needed for current CrCl  -Avoid nephrotoxins ( NSAIDS, IV contrast)   -Nephrology consult recommended  -Bladder scan post void  -Renal US if not done previously     Shortness of breath  -steroids, duonebs  -on 2L NC at home, COPD?  -hx asthma    Transaminitis  Emesis  -Denies abd pain   -KUB  -PPI  -Occult and gastric occult stool  -GI panel  -4 times overnight  -AST/ALT 92/98--- > 631/412  -Hepatic panel stat  -If worsening consider Ct abd/pelvis for further workup    Tachycardia  -RN reports possible Aflutter earlier today, but seems to resolve at that time heparin gtt and amio was ordered but not started per RN    Leukocytosis  -WBC increasing.  -Blood Cx sent 03/19, Ua negative  -GI panel  -Send lactic, procal, legionella and strep pneumo, sputum cx    Hx CHF  -EF 46-50%  -ECHO ordered, worsening ROCCO/LFTS and shortness of breath related to heart?  -Consider milrinone drip if concern for worsening heart failure  -Recommend cardiology consult    DM  -SSI    __x___   Video Assessment performed  __x___   Most recent labs reviewed  __x___   Vital  Signs reviewed  __x___   Best Practices addressed:                 VTE prophylaxis: LOvenox                 SUP (when indicated): PPI                 Current Glucose: Please notify bedside physician when present or [facility] if glc > 180 X 2                 Sepsis guidelines:n                 Lung protective strategy:n                 Targeted Temperature Management:n  __x___     Spoke with bedside RN  __x___     Orders written    [facility] if bedside physician is not present.    Karissa Diaz APRN, AGACNP-BC  CCt 45 minutes      Interventions Major-Acute renal failure - evaluation and management  Intermediate-Best-practice therapies (e.g. VTE, beta blocker, etc.), Communication with other healthcare providers and/or family, Diagnostic test evaluation, Electrolyte abnormality - evaluation and management        Electronically Signed by: Karissa Diaz (NP) on 03/19/2023 20:18    Annotated By: Roly Lopez (MD)    Date: 03/19/23 21:04  JANE note reviewed. Agree with note and plan.

## 2023-03-20 NOTE — SIGNIFICANT NOTE
Patient time of death 09. Family at bedside. KERON called. Paperwork complete. [facility] notified.

## 2023-03-24 LAB
BACTERIA SPEC AEROBE CULT: NORMAL
BACTERIA SPEC AEROBE CULT: NORMAL